# Patient Record
Sex: FEMALE | Race: WHITE | NOT HISPANIC OR LATINO | Employment: OTHER | URBAN - METROPOLITAN AREA
[De-identification: names, ages, dates, MRNs, and addresses within clinical notes are randomized per-mention and may not be internally consistent; named-entity substitution may affect disease eponyms.]

---

## 2017-01-25 ENCOUNTER — ANTI-COAG VISIT (OUTPATIENT)
Dept: CARDIOLOGY | Facility: CLINIC | Age: 77
End: 2017-01-25

## 2017-01-25 ENCOUNTER — LAB VISIT (OUTPATIENT)
Dept: LAB | Facility: HOSPITAL | Age: 77
End: 2017-01-25
Attending: INTERNAL MEDICINE
Payer: MEDICARE

## 2017-01-25 DIAGNOSIS — Z79.01 LONG-TERM (CURRENT) USE OF ANTICOAGULANTS: ICD-10-CM

## 2017-01-25 DIAGNOSIS — I48.0 PAF (PAROXYSMAL ATRIAL FIBRILLATION): ICD-10-CM

## 2017-01-25 LAB
INR PPP: 2.1
PROTHROMBIN TIME: 23.4 SEC

## 2017-01-25 PROCEDURE — 36415 COLL VENOUS BLD VENIPUNCTURE: CPT | Mod: PO

## 2017-01-25 PROCEDURE — 85610 PROTHROMBIN TIME: CPT | Mod: PO

## 2017-02-24 NOTE — TELEPHONE ENCOUNTER
----- Message from Wanda Simental sent at 2/23/2017  4:36 PM CST -----  Contact: self  Pt came in the office requesting a refill on her Cardio meds   Pt uses [facility] [phone]  Pt needs: pacerone 200 mg   Norcasv 10mg  Lasix 20 mg  Lopressor 50 mg

## 2017-02-27 RX ORDER — AMLODIPINE BESYLATE 10 MG/1
10 TABLET ORAL DAILY
Qty: 90 TABLET | Refills: 3 | Status: SHIPPED | OUTPATIENT
Start: 2017-02-27 | End: 2018-04-25 | Stop reason: SDUPTHER

## 2017-02-27 RX ORDER — FUROSEMIDE 20 MG/1
20 TABLET ORAL DAILY
Qty: 90 TABLET | Refills: 3 | Status: SHIPPED | OUTPATIENT
Start: 2017-02-27 | End: 2019-01-30 | Stop reason: SDUPTHER

## 2017-02-27 RX ORDER — AMIODARONE HYDROCHLORIDE 200 MG/1
200 TABLET ORAL DAILY
Qty: 90 TABLET | Refills: 3 | Status: SHIPPED | OUTPATIENT
Start: 2017-02-27 | End: 2018-03-01 | Stop reason: SDUPTHER

## 2017-02-27 RX ORDER — METOPROLOL TARTRATE 50 MG/1
50 TABLET ORAL 2 TIMES DAILY
Qty: 180 TABLET | Refills: 3 | Status: SHIPPED | OUTPATIENT
Start: 2017-02-27 | End: 2019-01-30 | Stop reason: SDUPTHER

## 2017-02-27 RX ORDER — ROSUVASTATIN CALCIUM 10 MG/1
10 TABLET, COATED ORAL NIGHTLY
Qty: 90 TABLET | Refills: 3 | Status: SHIPPED | OUTPATIENT
Start: 2017-02-27 | End: 2020-02-26 | Stop reason: SDUPTHER

## 2017-03-01 ENCOUNTER — LAB VISIT (OUTPATIENT)
Dept: LAB | Facility: HOSPITAL | Age: 77
End: 2017-03-01
Attending: INTERNAL MEDICINE
Payer: MEDICARE

## 2017-03-01 ENCOUNTER — ANTI-COAG VISIT (OUTPATIENT)
Dept: CARDIOLOGY | Facility: CLINIC | Age: 77
End: 2017-03-01

## 2017-03-01 DIAGNOSIS — Z79.01 LONG-TERM (CURRENT) USE OF ANTICOAGULANTS: ICD-10-CM

## 2017-03-01 DIAGNOSIS — I48.0 PAF (PAROXYSMAL ATRIAL FIBRILLATION): ICD-10-CM

## 2017-03-01 LAB
INR PPP: 2.2
PROTHROMBIN TIME: 24.5 SEC

## 2017-03-01 PROCEDURE — 36415 COLL VENOUS BLD VENIPUNCTURE: CPT | Mod: PO

## 2017-03-01 PROCEDURE — 85610 PROTHROMBIN TIME: CPT | Mod: PO

## 2017-04-12 ENCOUNTER — ANTI-COAG VISIT (OUTPATIENT)
Dept: CARDIOLOGY | Facility: CLINIC | Age: 77
End: 2017-04-12

## 2017-04-12 ENCOUNTER — LAB VISIT (OUTPATIENT)
Dept: LAB | Facility: HOSPITAL | Age: 77
End: 2017-04-12
Attending: INTERNAL MEDICINE
Payer: MEDICARE

## 2017-04-12 DIAGNOSIS — Z79.01 LONG-TERM (CURRENT) USE OF ANTICOAGULANTS: ICD-10-CM

## 2017-04-12 DIAGNOSIS — I48.0 PAF (PAROXYSMAL ATRIAL FIBRILLATION): ICD-10-CM

## 2017-04-12 LAB
INR PPP: 2.6
PROTHROMBIN TIME: 28.3 SEC

## 2017-04-12 PROCEDURE — 85610 PROTHROMBIN TIME: CPT | Mod: PO

## 2017-04-12 PROCEDURE — 36415 COLL VENOUS BLD VENIPUNCTURE: CPT | Mod: PO

## 2017-04-24 RX ORDER — WARFARIN SODIUM 5 MG/1
5 TABLET ORAL DAILY
Qty: 90 TABLET | Refills: 3 | Status: SHIPPED | OUTPATIENT
Start: 2017-04-24 | End: 2018-06-06 | Stop reason: SDUPTHER

## 2017-05-10 ENCOUNTER — LAB VISIT (OUTPATIENT)
Dept: LAB | Facility: HOSPITAL | Age: 77
End: 2017-05-10
Attending: INTERNAL MEDICINE
Payer: MEDICARE

## 2017-05-10 ENCOUNTER — ANTI-COAG VISIT (OUTPATIENT)
Dept: CARDIOLOGY | Facility: CLINIC | Age: 77
End: 2017-05-10

## 2017-05-10 DIAGNOSIS — I48.0 PAF (PAROXYSMAL ATRIAL FIBRILLATION): ICD-10-CM

## 2017-05-10 DIAGNOSIS — Z79.01 LONG-TERM (CURRENT) USE OF ANTICOAGULANTS: ICD-10-CM

## 2017-05-10 LAB
INR PPP: 2.3
PROTHROMBIN TIME: 25.1 SEC

## 2017-05-10 PROCEDURE — 36415 COLL VENOUS BLD VENIPUNCTURE: CPT | Mod: PO

## 2017-05-10 PROCEDURE — 85610 PROTHROMBIN TIME: CPT | Mod: PO

## 2017-06-21 ENCOUNTER — LAB VISIT (OUTPATIENT)
Dept: LAB | Facility: HOSPITAL | Age: 77
End: 2017-06-21
Attending: INTERNAL MEDICINE
Payer: MEDICARE

## 2017-06-21 ENCOUNTER — ANTI-COAG VISIT (OUTPATIENT)
Dept: CARDIOLOGY | Facility: CLINIC | Age: 77
End: 2017-06-21

## 2017-06-21 DIAGNOSIS — I48.0 PAF (PAROXYSMAL ATRIAL FIBRILLATION): ICD-10-CM

## 2017-06-21 DIAGNOSIS — Z79.01 LONG-TERM (CURRENT) USE OF ANTICOAGULANTS: ICD-10-CM

## 2017-06-21 LAB
INR PPP: 3.5
PROTHROMBIN TIME: 37.2 SEC

## 2017-06-21 PROCEDURE — 36415 COLL VENOUS BLD VENIPUNCTURE: CPT | Mod: PO

## 2017-06-21 PROCEDURE — 85610 PROTHROMBIN TIME: CPT | Mod: PO

## 2017-06-21 NOTE — PROGRESS NOTES
Patient denied changes.  Hold then rechallenge prior stable warfarin dose.  Monitor INR more closely.

## 2017-07-12 ENCOUNTER — ANTI-COAG VISIT (OUTPATIENT)
Dept: CARDIOLOGY | Facility: CLINIC | Age: 77
End: 2017-07-12

## 2017-07-12 ENCOUNTER — LAB VISIT (OUTPATIENT)
Dept: LAB | Facility: HOSPITAL | Age: 77
End: 2017-07-12
Attending: INTERNAL MEDICINE
Payer: MEDICARE

## 2017-07-12 DIAGNOSIS — Z79.01 LONG-TERM (CURRENT) USE OF ANTICOAGULANTS: ICD-10-CM

## 2017-07-12 DIAGNOSIS — I48.0 PAF (PAROXYSMAL ATRIAL FIBRILLATION): ICD-10-CM

## 2017-07-12 LAB
INR PPP: 3
PROTHROMBIN TIME: 32.2 SEC

## 2017-07-12 PROCEDURE — 85610 PROTHROMBIN TIME: CPT | Mod: PO

## 2017-07-12 PROCEDURE — 36415 COLL VENOUS BLD VENIPUNCTURE: CPT | Mod: PO

## 2017-08-15 RX ORDER — ROSUVASTATIN CALCIUM 10 MG/1
TABLET, COATED ORAL
Qty: 90 TABLET | Refills: 3 | Status: SHIPPED | OUTPATIENT
Start: 2017-08-15 | End: 2018-08-22 | Stop reason: SDUPTHER

## 2017-08-15 RX ORDER — FUROSEMIDE 20 MG/1
TABLET ORAL
Qty: 90 TABLET | Refills: 3 | Status: SHIPPED | OUTPATIENT
Start: 2017-08-15 | End: 2018-08-22 | Stop reason: SDUPTHER

## 2017-08-16 ENCOUNTER — LAB VISIT (OUTPATIENT)
Dept: LAB | Facility: HOSPITAL | Age: 77
End: 2017-08-16
Attending: INTERNAL MEDICINE
Payer: MEDICARE

## 2017-08-16 ENCOUNTER — ANTI-COAG VISIT (OUTPATIENT)
Dept: CARDIOLOGY | Facility: CLINIC | Age: 77
End: 2017-08-16

## 2017-08-16 DIAGNOSIS — Z79.01 LONG-TERM (CURRENT) USE OF ANTICOAGULANTS: ICD-10-CM

## 2017-08-16 DIAGNOSIS — I48.0 PAF (PAROXYSMAL ATRIAL FIBRILLATION): ICD-10-CM

## 2017-08-16 LAB
INR PPP: 2.8
PROTHROMBIN TIME: 30.3 SEC

## 2017-08-16 PROCEDURE — 85610 PROTHROMBIN TIME: CPT | Mod: PO

## 2017-08-16 PROCEDURE — 36415 COLL VENOUS BLD VENIPUNCTURE: CPT | Mod: PO

## 2017-09-27 ENCOUNTER — ANTI-COAG VISIT (OUTPATIENT)
Dept: CARDIOLOGY | Facility: CLINIC | Age: 77
End: 2017-09-27

## 2017-09-27 ENCOUNTER — LAB VISIT (OUTPATIENT)
Dept: LAB | Facility: HOSPITAL | Age: 77
End: 2017-09-27
Attending: INTERNAL MEDICINE
Payer: MEDICARE

## 2017-09-27 DIAGNOSIS — Z79.01 LONG-TERM (CURRENT) USE OF ANTICOAGULANTS: ICD-10-CM

## 2017-09-27 DIAGNOSIS — I48.0 PAF (PAROXYSMAL ATRIAL FIBRILLATION): ICD-10-CM

## 2017-09-27 LAB
INR PPP: 2.2
PROTHROMBIN TIME: 23.7 SEC

## 2017-09-27 PROCEDURE — 36415 COLL VENOUS BLD VENIPUNCTURE: CPT | Mod: PO

## 2017-09-27 PROCEDURE — 85610 PROTHROMBIN TIME: CPT | Mod: PO

## 2017-11-08 ENCOUNTER — ANTI-COAG VISIT (OUTPATIENT)
Dept: CARDIOLOGY | Facility: CLINIC | Age: 77
End: 2017-11-08

## 2017-11-08 ENCOUNTER — LAB VISIT (OUTPATIENT)
Dept: LAB | Facility: HOSPITAL | Age: 77
End: 2017-11-08
Attending: INTERNAL MEDICINE
Payer: MEDICARE

## 2017-11-08 DIAGNOSIS — I48.0 PAF (PAROXYSMAL ATRIAL FIBRILLATION): ICD-10-CM

## 2017-11-08 DIAGNOSIS — Z79.01 LONG-TERM (CURRENT) USE OF ANTICOAGULANTS: ICD-10-CM

## 2017-11-08 LAB
INR PPP: 2.2
PROTHROMBIN TIME: 23.4 SEC

## 2017-11-08 PROCEDURE — 85610 PROTHROMBIN TIME: CPT | Mod: PO

## 2017-11-08 PROCEDURE — 36415 COLL VENOUS BLD VENIPUNCTURE: CPT | Mod: PO

## 2017-11-16 RX ORDER — METOPROLOL TARTRATE 50 MG/1
TABLET ORAL
Qty: 180 TABLET | Refills: 3 | Status: SHIPPED | OUTPATIENT
Start: 2017-11-16 | End: 2019-04-02 | Stop reason: SDUPTHER

## 2017-12-20 ENCOUNTER — LAB VISIT (OUTPATIENT)
Dept: LAB | Facility: HOSPITAL | Age: 77
End: 2017-12-20
Attending: INTERNAL MEDICINE
Payer: MEDICARE

## 2017-12-20 ENCOUNTER — ANTI-COAG VISIT (OUTPATIENT)
Dept: CARDIOLOGY | Facility: CLINIC | Age: 77
End: 2017-12-20

## 2017-12-20 DIAGNOSIS — Z79.01 LONG-TERM (CURRENT) USE OF ANTICOAGULANTS: ICD-10-CM

## 2017-12-20 DIAGNOSIS — I48.0 PAF (PAROXYSMAL ATRIAL FIBRILLATION): ICD-10-CM

## 2017-12-20 LAB
INR PPP: 2.1
PROTHROMBIN TIME: 23.2 SEC

## 2017-12-20 PROCEDURE — 85610 PROTHROMBIN TIME: CPT | Mod: PO

## 2017-12-20 PROCEDURE — 36415 COLL VENOUS BLD VENIPUNCTURE: CPT | Mod: PO

## 2018-01-11 ENCOUNTER — TELEPHONE (OUTPATIENT)
Dept: FAMILY MEDICINE | Facility: CLINIC | Age: 78
End: 2018-01-11

## 2018-01-11 NOTE — TELEPHONE ENCOUNTER
----- Message from Barbra Agosto sent at 1/11/2018  3:23 PM CST -----  Contact: Kamille/[phone] ext [phone]  Kamille with [facility] needs to go over the plan of care for the patients diabetes, location of corn or callous or foot deformity and be signed and dated by the doctor. Please call her at [phone]

## 2018-01-31 ENCOUNTER — LAB VISIT (OUTPATIENT)
Dept: LAB | Facility: HOSPITAL | Age: 78
End: 2018-01-31
Attending: INTERNAL MEDICINE
Payer: MEDICARE

## 2018-01-31 ENCOUNTER — ANTI-COAG VISIT (OUTPATIENT)
Dept: CARDIOLOGY | Facility: CLINIC | Age: 78
End: 2018-01-31

## 2018-01-31 DIAGNOSIS — Z79.01 LONG TERM CURRENT USE OF ANTICOAGULANT THERAPY: ICD-10-CM

## 2018-01-31 DIAGNOSIS — I48.0 PAF (PAROXYSMAL ATRIAL FIBRILLATION): ICD-10-CM

## 2018-01-31 LAB
INR PPP: 2.5
PROTHROMBIN TIME: 27.3 SEC

## 2018-01-31 PROCEDURE — 36415 COLL VENOUS BLD VENIPUNCTURE: CPT | Mod: PO

## 2018-01-31 PROCEDURE — 85610 PROTHROMBIN TIME: CPT | Mod: PO

## 2018-02-14 ENCOUNTER — LAB VISIT (OUTPATIENT)
Dept: LAB | Facility: HOSPITAL | Age: 78
End: 2018-02-14
Attending: INTERNAL MEDICINE
Payer: MEDICARE

## 2018-02-14 ENCOUNTER — ANTI-COAG VISIT (OUTPATIENT)
Dept: CARDIOLOGY | Facility: CLINIC | Age: 78
End: 2018-02-14

## 2018-02-14 DIAGNOSIS — I48.0 PAF (PAROXYSMAL ATRIAL FIBRILLATION): ICD-10-CM

## 2018-02-14 DIAGNOSIS — Z79.01 LONG TERM CURRENT USE OF ANTICOAGULANT THERAPY: ICD-10-CM

## 2018-02-14 LAB
INR PPP: 2.9
PROTHROMBIN TIME: 31.5 SEC

## 2018-02-14 PROCEDURE — 85610 PROTHROMBIN TIME: CPT | Mod: PO

## 2018-02-14 PROCEDURE — 36415 COLL VENOUS BLD VENIPUNCTURE: CPT | Mod: PO

## 2018-02-28 ENCOUNTER — LAB VISIT (OUTPATIENT)
Dept: LAB | Facility: HOSPITAL | Age: 78
End: 2018-02-28
Attending: INTERNAL MEDICINE
Payer: MEDICARE

## 2018-02-28 ENCOUNTER — ANTI-COAG VISIT (OUTPATIENT)
Dept: CARDIOLOGY | Facility: CLINIC | Age: 78
End: 2018-02-28

## 2018-02-28 DIAGNOSIS — Z79.01 LONG TERM CURRENT USE OF ANTICOAGULANT THERAPY: ICD-10-CM

## 2018-02-28 DIAGNOSIS — I48.0 PAF (PAROXYSMAL ATRIAL FIBRILLATION): ICD-10-CM

## 2018-02-28 LAB
INR PPP: 2.2
PROTHROMBIN TIME: 24.2 SEC

## 2018-02-28 PROCEDURE — 36415 COLL VENOUS BLD VENIPUNCTURE: CPT | Mod: PO

## 2018-02-28 PROCEDURE — 85610 PROTHROMBIN TIME: CPT | Mod: PO

## 2018-03-01 RX ORDER — AMIODARONE HYDROCHLORIDE 200 MG/1
TABLET ORAL
Qty: 90 TABLET | Refills: 3 | Status: SHIPPED | OUTPATIENT
Start: 2018-03-01 | End: 2019-01-30 | Stop reason: ALTCHOICE

## 2018-03-14 ENCOUNTER — ANTI-COAG VISIT (OUTPATIENT)
Dept: CARDIOLOGY | Facility: CLINIC | Age: 78
End: 2018-03-14

## 2018-03-14 ENCOUNTER — LAB VISIT (OUTPATIENT)
Dept: LAB | Facility: HOSPITAL | Age: 78
End: 2018-03-14
Attending: INTERNAL MEDICINE
Payer: MEDICARE

## 2018-03-14 DIAGNOSIS — Z79.01 LONG TERM CURRENT USE OF ANTICOAGULANT THERAPY: ICD-10-CM

## 2018-03-14 DIAGNOSIS — I48.0 PAF (PAROXYSMAL ATRIAL FIBRILLATION): ICD-10-CM

## 2018-03-14 LAB
INR PPP: 3.5
PROTHROMBIN TIME: 37.8 SEC

## 2018-03-14 PROCEDURE — 85610 PROTHROMBIN TIME: CPT | Mod: PO

## 2018-03-14 PROCEDURE — 36415 COLL VENOUS BLD VENIPUNCTURE: CPT | Mod: PO

## 2018-03-28 ENCOUNTER — LAB VISIT (OUTPATIENT)
Dept: LAB | Facility: HOSPITAL | Age: 78
End: 2018-03-28
Attending: INTERNAL MEDICINE
Payer: MEDICARE

## 2018-03-28 ENCOUNTER — ANTI-COAG VISIT (OUTPATIENT)
Dept: CARDIOLOGY | Facility: CLINIC | Age: 78
End: 2018-03-28

## 2018-03-28 DIAGNOSIS — I48.0 PAF (PAROXYSMAL ATRIAL FIBRILLATION): ICD-10-CM

## 2018-03-28 DIAGNOSIS — Z79.01 LONG TERM CURRENT USE OF ANTICOAGULANT THERAPY: ICD-10-CM

## 2018-03-28 LAB
INR PPP: 2.6
PROTHROMBIN TIME: 28 SEC

## 2018-03-28 PROCEDURE — 36415 COLL VENOUS BLD VENIPUNCTURE: CPT | Mod: PO

## 2018-03-28 PROCEDURE — 85610 PROTHROMBIN TIME: CPT | Mod: PO

## 2018-04-11 ENCOUNTER — LAB VISIT (OUTPATIENT)
Dept: LAB | Facility: HOSPITAL | Age: 78
End: 2018-04-11
Attending: INTERNAL MEDICINE
Payer: MEDICARE

## 2018-04-11 ENCOUNTER — ANTI-COAG VISIT (OUTPATIENT)
Dept: CARDIOLOGY | Facility: CLINIC | Age: 78
End: 2018-04-11

## 2018-04-11 DIAGNOSIS — I48.0 PAF (PAROXYSMAL ATRIAL FIBRILLATION): ICD-10-CM

## 2018-04-11 DIAGNOSIS — Z79.01 LONG TERM CURRENT USE OF ANTICOAGULANT THERAPY: ICD-10-CM

## 2018-04-11 LAB
INR PPP: 2
PROTHROMBIN TIME: 21.4 SEC

## 2018-04-11 PROCEDURE — 36415 COLL VENOUS BLD VENIPUNCTURE: CPT | Mod: PO

## 2018-04-11 PROCEDURE — 85610 PROTHROMBIN TIME: CPT | Mod: PO

## 2018-04-26 RX ORDER — AMLODIPINE BESYLATE 10 MG/1
TABLET ORAL
Qty: 90 TABLET | Refills: 3 | Status: SHIPPED | OUTPATIENT
Start: 2018-04-26 | End: 2020-02-26

## 2018-05-02 ENCOUNTER — ANTI-COAG VISIT (OUTPATIENT)
Dept: CARDIOLOGY | Facility: CLINIC | Age: 78
End: 2018-05-02

## 2018-05-02 ENCOUNTER — LAB VISIT (OUTPATIENT)
Dept: LAB | Facility: HOSPITAL | Age: 78
End: 2018-05-02
Attending: INTERNAL MEDICINE
Payer: MEDICARE

## 2018-05-02 DIAGNOSIS — I48.0 PAF (PAROXYSMAL ATRIAL FIBRILLATION): ICD-10-CM

## 2018-05-02 DIAGNOSIS — Z79.01 LONG TERM CURRENT USE OF ANTICOAGULANT THERAPY: ICD-10-CM

## 2018-05-02 LAB
INR PPP: 3.3
PROTHROMBIN TIME: 35.8 SEC

## 2018-05-02 PROCEDURE — 36415 COLL VENOUS BLD VENIPUNCTURE: CPT | Mod: PO

## 2018-05-02 PROCEDURE — 85610 PROTHROMBIN TIME: CPT | Mod: PO

## 2018-05-16 ENCOUNTER — LAB VISIT (OUTPATIENT)
Dept: LAB | Facility: HOSPITAL | Age: 78
End: 2018-05-16
Attending: INTERNAL MEDICINE
Payer: MEDICARE

## 2018-05-16 DIAGNOSIS — I48.0 PAF (PAROXYSMAL ATRIAL FIBRILLATION): ICD-10-CM

## 2018-05-16 DIAGNOSIS — Z79.01 LONG TERM CURRENT USE OF ANTICOAGULANT THERAPY: ICD-10-CM

## 2018-05-16 LAB
INR PPP: 2.9
PROTHROMBIN TIME: 30.8 SEC

## 2018-05-16 PROCEDURE — 36415 COLL VENOUS BLD VENIPUNCTURE: CPT | Mod: PO

## 2018-05-16 PROCEDURE — 85610 PROTHROMBIN TIME: CPT | Mod: PO

## 2018-05-17 ENCOUNTER — ANTI-COAG VISIT (OUTPATIENT)
Dept: CARDIOLOGY | Facility: CLINIC | Age: 78
End: 2018-05-17

## 2018-05-17 DIAGNOSIS — I48.0 PAF (PAROXYSMAL ATRIAL FIBRILLATION): ICD-10-CM

## 2018-06-06 ENCOUNTER — LAB VISIT (OUTPATIENT)
Dept: LAB | Facility: HOSPITAL | Age: 78
End: 2018-06-06
Attending: INTERNAL MEDICINE
Payer: MEDICARE

## 2018-06-06 ENCOUNTER — ANTI-COAG VISIT (OUTPATIENT)
Dept: CARDIOLOGY | Facility: CLINIC | Age: 78
End: 2018-06-06

## 2018-06-06 DIAGNOSIS — I48.0 PAF (PAROXYSMAL ATRIAL FIBRILLATION): ICD-10-CM

## 2018-06-06 DIAGNOSIS — Z79.01 LONG TERM CURRENT USE OF ANTICOAGULANT THERAPY: ICD-10-CM

## 2018-06-06 LAB
INR PPP: 2.6
PROTHROMBIN TIME: 28.4 SEC

## 2018-06-06 PROCEDURE — 36415 COLL VENOUS BLD VENIPUNCTURE: CPT | Mod: PO

## 2018-06-06 PROCEDURE — 85610 PROTHROMBIN TIME: CPT | Mod: PO

## 2018-06-08 RX ORDER — WARFARIN SODIUM 5 MG/1
TABLET ORAL
Qty: 90 TABLET | Refills: 3 | Status: SHIPPED | OUTPATIENT
Start: 2018-06-08 | End: 2018-08-01 | Stop reason: ALTCHOICE

## 2018-06-12 ENCOUNTER — TELEPHONE (OUTPATIENT)
Dept: CARDIOLOGY | Facility: CLINIC | Age: 78
End: 2018-06-12

## 2018-06-12 NOTE — TELEPHONE ENCOUNTER
----- Message from MÓNICA Rizvi ANP sent at 6/8/2018  2:13 PM CDT -----  Contact: Greer  Patient last seen by Dr. Nguyen in 5/2016. Can you please review to see if she needs to follow up? I would presume she would need to follow up every year    Thanks  Greer

## 2018-06-27 ENCOUNTER — LAB VISIT (OUTPATIENT)
Dept: LAB | Facility: HOSPITAL | Age: 78
End: 2018-06-27
Attending: INTERNAL MEDICINE
Payer: MEDICARE

## 2018-06-27 DIAGNOSIS — Z79.01 LONG TERM CURRENT USE OF ANTICOAGULANT THERAPY: ICD-10-CM

## 2018-06-27 DIAGNOSIS — I48.0 PAF (PAROXYSMAL ATRIAL FIBRILLATION): ICD-10-CM

## 2018-06-27 LAB
INR PPP: 1.8
PROTHROMBIN TIME: 19.5 SEC

## 2018-06-27 PROCEDURE — 85610 PROTHROMBIN TIME: CPT | Mod: PO

## 2018-06-27 PROCEDURE — 36415 COLL VENOUS BLD VENIPUNCTURE: CPT | Mod: PO

## 2018-06-28 ENCOUNTER — ANTI-COAG VISIT (OUTPATIENT)
Dept: CARDIOLOGY | Facility: CLINIC | Age: 78
End: 2018-06-28

## 2018-06-28 DIAGNOSIS — I48.0 PAF (PAROXYSMAL ATRIAL FIBRILLATION): ICD-10-CM

## 2018-07-11 ENCOUNTER — LAB VISIT (OUTPATIENT)
Dept: LAB | Facility: HOSPITAL | Age: 78
End: 2018-07-11
Attending: INTERNAL MEDICINE
Payer: MEDICARE

## 2018-07-11 ENCOUNTER — ANTI-COAG VISIT (OUTPATIENT)
Dept: CARDIOLOGY | Facility: CLINIC | Age: 78
End: 2018-07-11

## 2018-07-11 DIAGNOSIS — Z79.01 LONG TERM CURRENT USE OF ANTICOAGULANT THERAPY: ICD-10-CM

## 2018-07-11 DIAGNOSIS — I48.0 PAF (PAROXYSMAL ATRIAL FIBRILLATION): ICD-10-CM

## 2018-07-11 LAB
INR PPP: 2.9
PROTHROMBIN TIME: 30.9 SEC

## 2018-07-11 PROCEDURE — 36415 COLL VENOUS BLD VENIPUNCTURE: CPT | Mod: PO

## 2018-07-11 PROCEDURE — 85610 PROTHROMBIN TIME: CPT | Mod: PO

## 2018-07-25 ENCOUNTER — TELEPHONE (OUTPATIENT)
Dept: CARDIOLOGY | Facility: CLINIC | Age: 78
End: 2018-07-25

## 2018-08-01 ENCOUNTER — ANTI-COAG VISIT (OUTPATIENT)
Dept: CARDIOLOGY | Facility: CLINIC | Age: 78
End: 2018-08-01

## 2018-08-01 ENCOUNTER — OFFICE VISIT (OUTPATIENT)
Dept: CARDIOLOGY | Facility: CLINIC | Age: 78
End: 2018-08-01
Payer: MEDICARE

## 2018-08-01 ENCOUNTER — LAB VISIT (OUTPATIENT)
Dept: LAB | Facility: HOSPITAL | Age: 78
End: 2018-08-01
Attending: INTERNAL MEDICINE
Payer: MEDICARE

## 2018-08-01 VITALS
DIASTOLIC BLOOD PRESSURE: 50 MMHG | WEIGHT: 183 LBS | HEART RATE: 56 BPM | HEIGHT: 68 IN | BODY MASS INDEX: 27.74 KG/M2 | SYSTOLIC BLOOD PRESSURE: 128 MMHG

## 2018-08-01 DIAGNOSIS — I48.0 PAF (PAROXYSMAL ATRIAL FIBRILLATION): ICD-10-CM

## 2018-08-01 DIAGNOSIS — Z72.0 TOBACCO USE: ICD-10-CM

## 2018-08-01 DIAGNOSIS — R09.89 BILATERAL CAROTID BRUITS: ICD-10-CM

## 2018-08-01 DIAGNOSIS — E78.2 MIXED HYPERLIPIDEMIA: ICD-10-CM

## 2018-08-01 DIAGNOSIS — Z79.01 LONG TERM CURRENT USE OF ANTICOAGULANT THERAPY: ICD-10-CM

## 2018-08-01 DIAGNOSIS — I73.9 PAD (PERIPHERAL ARTERY DISEASE): Primary | ICD-10-CM

## 2018-08-01 LAB
INR PPP: 2.8
PROTHROMBIN TIME: 30.6 SEC

## 2018-08-01 PROCEDURE — 3074F SYST BP LT 130 MM HG: CPT | Mod: CPTII,S$GLB,, | Performed by: INTERNAL MEDICINE

## 2018-08-01 PROCEDURE — 99999 PR PBB SHADOW E&M-EST. PATIENT-LVL III: CPT | Mod: PBBFAC,,, | Performed by: INTERNAL MEDICINE

## 2018-08-01 PROCEDURE — 36415 COLL VENOUS BLD VENIPUNCTURE: CPT | Mod: PO

## 2018-08-01 PROCEDURE — 85610 PROTHROMBIN TIME: CPT | Mod: PO

## 2018-08-01 PROCEDURE — 93000 ELECTROCARDIOGRAM COMPLETE: CPT | Mod: S$GLB,,, | Performed by: INTERNAL MEDICINE

## 2018-08-01 PROCEDURE — 3078F DIAST BP <80 MM HG: CPT | Mod: CPTII,S$GLB,, | Performed by: INTERNAL MEDICINE

## 2018-08-01 PROCEDURE — 99215 OFFICE O/P EST HI 40 MIN: CPT | Mod: S$GLB,,, | Performed by: INTERNAL MEDICINE

## 2018-08-01 RX ORDER — WARFARIN 7.5 MG/1
7.5 TABLET ORAL DAILY
Qty: 90 TABLET | Refills: 3 | Status: SHIPPED | OUTPATIENT
Start: 2018-08-01 | End: 2018-08-01 | Stop reason: ALTCHOICE

## 2018-08-01 NOTE — PROGRESS NOTES
Subjective:   Patient ID:  Yanni Cra is a 77 y.o. female who presents to establish care for  Hyperlipidemia; Hypertension; Peripheral Arterial Disease; and R carotid bruit      HPI:       Yanni Car 77 y.o. female is here follow up and feeling well without any new complaints. She was previously under the care of Dr. Valerio. She had PAF several years ago. She has been on coumadin and amiodarone for 2 years. She in NSR today. She has PAD s/p failed SVG R fem-pop in 2013. No claudication. She smokes and is very interested in quitting.       ECG today: sinus bradycardia       Afib was related to SIRS in 2012 while she was admitted for pneumonia    Carotid US 2016   Patent bilateral ICA   Patent L vertebral   Retrograde flow R vertebral concerning for steal syndrome    Event monitor 2016   No afib              Patient Active Problem List    Diagnosis Date Noted    Long term current use of anticoagulant therapy 2016    PAD (peripheral artery disease) 2016       S/p failed R reverse vein fem-pop done 2013   Failure within 2 to 4 weeks   No claudication at this time      Tobacco use 2016    Hyperlipidemia 2016    PAF (paroxysmal atrial fibrillation) 2016     In NSR today-on amiodarone  [id number] 4 with 4% risk of yearly CVA    Afib was related to SIRS in 2012  She was admitted for pneumonia              Bilateral carotid bruits 2016    BCC (basal cell carcinoma), scalp/neck 2012    Post-operative state 2012    Mohs defect of eyelid 2012    Basal cell carcinoma of eyelid 09/10/2012    Canthal dystopia 09/10/2012    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   HENT: Negative for congestion.    Eyes: Negative for blurred vision, discharge and double vision.    Cardiovascular: Positive for leg swelling. Negative for chest pain, claudication, cyanosis, dyspnea on exertion, irregular heartbeat, near-syncope, orthopnea, palpitations, paroxysmal nocturnal dyspnea and syncope.   Respiratory: Negative for cough, shortness of breath and wheezing.    Endocrine: Negative for cold intolerance, heat intolerance and polyphagia.   Hematologic/Lymphatic: Negative for adenopathy and bleeding problem. Does not bruise/bleed easily.   Skin: Negative for dry skin and nail changes.   Musculoskeletal: Negative for arthritis, back pain, falls, joint pain, myalgias and neck pain.   Gastrointestinal: Negative for bloating, abdominal pain, change in bowel habit and constipation.   Genitourinary: Negative for bladder incontinence, dysuria, flank pain, genital sores and missed menses.   Neurological: Negative for aphonia, brief paralysis, difficulty with concentration and dizziness.   Psychiatric/Behavioral: Negative for altered mental status and memory loss. The patient does not have insomnia.    Allergic/Immunologic: Negative for environmental allergies.       Objective:   Physical Exam   Constitutional: She is oriented to person, place, and time. She appears well-developed and well-nourished. She is not intubated.   HENT:   Head: Normocephalic and atraumatic.   Right Ear: External ear normal.   Left Ear: External ear normal.   Mouth/Throat: Oropharynx is clear and moist.   Eyes: Conjunctivae and EOM are normal. Pupils are equal, round, and reactive to light. Right eye exhibits no discharge. Left eye exhibits no discharge. No scleral icterus.   Neck: Normal range of motion. Neck supple. Normal carotid pulses, no hepatojugular reflux and no JVD present. Carotid bruit is not present. No tracheal deviation present. No thyromegaly present.   Cardiovascular: Normal rate, regular rhythm, S1 normal and S2 normal.   No extrasystoles are present. PMI is not displaced.  Exam reveals no gallop, no S3, no  distant heart sounds, no friction rub and no midsystolic click.    No murmur heard.  Pulses:       Carotid pulses are 2+ on the right side with bruit, and 2+ on the left side with bruit.       Radial pulses are 2+ on the right side, and 2+ on the left side.        Femoral pulses are 2+ on the right side, and 2+ on the left side.       Popliteal pulses are 2+ on the right side, and 2+ on the left side.        Dorsalis pedis pulses are 0 on the right side, and 0 on the left side.        Posterior tibial pulses are 0 on the right side, and 0 on the left side.   Monophasic R DP with biphasic R PT  Monophasic L PT with biphasic L DP   Pulmonary/Chest: Effort normal and breath sounds normal. No accessory muscle usage or stridor. No apnea, no tachypnea and no bradypnea. She is not intubated. No respiratory distress. She has no decreased breath sounds. She has no wheezes. She has no rales. She exhibits no tenderness and no bony tenderness.   Abdominal: She exhibits no distension, no pulsatile liver, no abdominal bruit, no ascites, no pulsatile midline mass and no mass. There is no tenderness. There is no rebound and no guarding.   Musculoskeletal: Normal range of motion. She exhibits no edema or tenderness.   Lymphadenopathy:     She has no cervical adenopathy.   Trace edema of bilateral ankles   Neurological: She is alert and oriented to person, place, and time. She has normal reflexes. No cranial nerve deficit. Coordination normal.   Skin: Skin is warm. No rash noted. No erythema. No pallor.   Psychiatric: She has a normal mood and affect. Her behavior is normal. Judgment and thought content normal.       Assessment:     1. PAD (peripheral artery disease)    2. Mixed hyperlipidemia    3. PAF (paroxysmal atrial fibrillation)    4. Bilateral carotid bruits    5. Tobacco use    6. Long term current use of anticoagulant therapy        Plan:       Low salt diet  Exercise  Smoking cessation      Echo to assess EF  Carotid  ultrasound for bruit  ABIX         She will stop coumadin  Afib was likely related to SIRS in 2012  Will also stop amiodarone after next visit        Continue with current medical plan and lifestyle changes.  Return sooner for concerns or questions. If symptoms persist go to the ED  I have reviewed all pertinent data on this patient       I have reviewed the patient's medical history in detail and updated the computerized patient record.    Orders Placed This Encounter   Procedures    US Carotid Bilateral     Standing Status:   Future     Standing Expiration Date:   8/1/2019    Comprehensive metabolic panel     Standing Status:   Future     Standing Expiration Date:   9/30/2019    Lipid panel     Standing Status:   Future     Standing Expiration Date:   9/30/2019    Ambulatory referral to Smoking Cessation Program     Referral Priority:   Routine     Referral Type:   Consultation     Referral Reason:   Specialty Services Required     Requested Specialty:   CTTS     Number of Visits Requested:   1    Cardiology [facility], Lower Extremities     Standing Status:   Future     Standing Expiration Date:   8/1/2019    IN OFFICE EKG 12-LEAD (to Muse)     Order Specific Question:   Diagnosis     Answer:   Atrial fibrillation [427.31.ICD-9-CM]       Follow up as scheduled. Return sooner for concerns or questions  Follow up after these studies in 6 months          She expressed verbal understanding and agreed with the plan        Patient's Medications   New Prescriptions    No medications on file   Previous Medications    AMIODARONE (PACERONE) 200 MG TAB    TAKE 1 TABLET BY MOUTH EVERY DAY    AMLODIPINE (NORVASC) 10 MG TABLET    TAKE 1 TABLET BY MOUTH EVERY DAY    ASPIRIN (ECOTRIN) 81 MG EC TABLET    Take 81 mg by mouth once daily.      CALCIUM-VITAMIN D3 (CALCIUM 500 + D) 500 MG(1,250MG) -200 UNIT PER TABLET    Take 1 tablet by mouth 2 (two) times daily with meals.    FISH OIL-OMEGA-3 FATTY ACIDS 300-1,000 MG  CAPSULE    Take 2 g by mouth once daily.      FUROSEMIDE (LASIX) 20 MG TABLET    Take 1 tablet (20 mg total) by mouth once daily.    FUROSEMIDE (LASIX) 20 MG TABLET    TAKE 1 TABLET BY MOUTH ONCE A DAY    METOPROLOL TARTRATE (LOPRESSOR) 50 MG TABLET    Take 1 tablet (50 mg total) by mouth 2 (two) times daily.    METOPROLOL TARTRATE (LOPRESSOR) 50 MG TABLET    TAKE 1 TABLET BY MOUTH TWO TIMES A DAY    RESTASIS 0.05 % OPHTHALMIC EMULSION        ROSUVASTATIN (CRESTOR) 10 MG TABLET    Take 1 tablet (10 mg total) by mouth every evening.    ROSUVASTATIN (CRESTOR) 10 MG TABLET    TAKE 1 TABLET BY MOUTH EVERY EVENING   Modified Medications    No medications on file   Discontinued Medications    WARFARIN (COUMADIN) 5 MG TABLET    TAKE 1 TABLET BY MOUTH EVERY DAY

## 2018-08-01 NOTE — PATIENT INSTRUCTIONS
Taking Aspirin for Atherosclerosis       Aspirin is a medicine often prescribed to treat atherosclerosis. This condition affects your arteries. These are the blood vessels that carry blood away from your heart. Having atherosclerosis means youre at greater risk of a heart attack or stroke. Aspirin can help prevent these from happening.  How atherosclerosis affects your arteries   A fatty material (plaque) can build up in your arteries. This makes it harder for blood to flow through them. A blood clot can then form on the plaque. This may block the artery, cutting off blood flow. This can cause conditions such as coronary artery disease (CAD) and peripheral arterial disease (PAD):  · CAD occurs when plaque builds up in the coronary artery. This artery supplies the heart with oxygen-rich blood.  · PAD occurs when plaque forms in leg arteries.  The same things that cause CAD and PAD can also cause plaque to form in other arteries in your body, such as those in the brain. When plaque occurs in any of these arteries, it raises your risk of heart attack or stroke.  What aspirin does  Aspirin is a blood-thinner (antiplatelet medicine). It helps keep blood clots from forming. This reduces the risk of blockage. Aspirin can be taken daily if you are at high risk of or have already had a heart attack or stroke. It is also used after a procedure called a stent placement. This is when a tiny wire mesh tube, or stent, is placed in an artery to keep it open. Aspirin helps prevent blood clots from forming on the stent.  Taking aspirin safely  Tell your healthcare provider about any medicines you are taking. This includes:  · All prescription medicines  · Over-the-counter medicines  · Herbs, vitamins, and other supplements  Also mention if you have a history of ulcers or bleeding problems. Ask whether you will need to stop taking aspirin before having surgery or dental work. Always take medicines as directed.  Tips for taking  aspirin  · Have a routine. For example, take aspirin with the same meal each day.  · Dont take more than prescribed. A low dose gives the same benefit as a higher one, with a lower risk of side effects.  · Dont skip doses. Aspirin needs to be taken each day to work well.  · Keep track of what you take. A pillbox with days of the week can help, especially if you take several medicines. Or use a list or chart to keep track.  When to call your healthcare provider  Side effects of aspirin are not usually serious. If you do have problems, changing your dose may help. Call your healthcare provider if you have any of the following:  · Excessive bruising (some bruising is normal)  · Nosebleeds, bleeding gums, or other excessive bleeding  · An upset stomach or stomach pain   Date Last Reviewed: 6/1/2016  © 9930-3050 [employer]. [address] [place] [address], [place] [address]. All rights reserved. This information is not intended as a substitute for professional medical care. Always follow your healthcare professional's instructions.

## 2018-08-22 RX ORDER — FUROSEMIDE 20 MG/1
TABLET ORAL
Qty: 90 TABLET | Refills: 3 | Status: SHIPPED | OUTPATIENT
Start: 2018-08-22 | End: 2019-09-04 | Stop reason: SDUPTHER

## 2018-08-22 RX ORDER — ROSUVASTATIN CALCIUM 10 MG/1
TABLET, COATED ORAL
Qty: 90 TABLET | Refills: 3 | Status: SHIPPED | OUTPATIENT
Start: 2018-08-22 | End: 2019-01-30 | Stop reason: SDUPTHER

## 2018-08-29 ENCOUNTER — LAB VISIT (OUTPATIENT)
Dept: LAB | Facility: HOSPITAL | Age: 78
End: 2018-08-29
Attending: INTERNAL MEDICINE
Payer: MEDICARE

## 2018-08-29 DIAGNOSIS — E78.2 MIXED HYPERLIPIDEMIA: ICD-10-CM

## 2018-08-29 LAB
ALBUMIN SERPL BCP-MCNC: 4.2 G/DL
ALP SERPL-CCNC: 74 U/L
ALT SERPL W/O P-5'-P-CCNC: 24 U/L
ANION GAP SERPL CALC-SCNC: 8 MMOL/L
AST SERPL-CCNC: 26 U/L
BILIRUB SERPL-MCNC: 0.5 MG/DL
BUN SERPL-MCNC: 21 MG/DL
CALCIUM SERPL-MCNC: 9.2 MG/DL
CHLORIDE SERPL-SCNC: 108 MMOL/L
CHOLEST SERPL-MCNC: 149 MG/DL
CHOLEST/HDLC SERPL: 2.7 {RATIO}
CO2 SERPL-SCNC: 28 MMOL/L
CREAT SERPL-MCNC: 1.35 MG/DL
EST. GFR  (AFRICAN AMERICAN): 44 ML/MIN/1.73 M^2
EST. GFR  (NON AFRICAN AMERICAN): 38.2 ML/MIN/1.73 M^2
GLUCOSE SERPL-MCNC: 115 MG/DL
HDLC SERPL-MCNC: 55 MG/DL
HDLC SERPL: 36.9 %
LDLC SERPL CALC-MCNC: 71.2 MG/DL
NONHDLC SERPL-MCNC: 94 MG/DL
POTASSIUM SERPL-SCNC: 5.5 MMOL/L
PROT SERPL-MCNC: 7.3 G/DL
SODIUM SERPL-SCNC: 144 MMOL/L
TRIGL SERPL-MCNC: 114 MG/DL

## 2018-08-29 PROCEDURE — 80061 LIPID PANEL: CPT

## 2018-08-29 PROCEDURE — 80053 COMPREHEN METABOLIC PANEL: CPT | Mod: PO

## 2018-08-29 PROCEDURE — 36415 COLL VENOUS BLD VENIPUNCTURE: CPT | Mod: PO

## 2018-09-07 ENCOUNTER — TELEPHONE (OUTPATIENT)
Dept: CARDIOLOGY | Facility: CLINIC | Age: 78
End: 2018-09-07

## 2018-09-07 NOTE — TELEPHONE ENCOUNTER
----- Message from Wanda Simental sent at 9/7/2018 10:11 AM CDT -----  Contact: self  Mrs Car came in the [place] office to inquire about her Coumadin. I called the coumadin clinic spoke to Farida and she informed me that Mrs Car has been discharged from the Coumadin clinic as of Sept. 1st by Dr. Nguyen .  Pt needs clarification as what she needs to do. Please call pt to discuss further [phone]

## 2019-01-22 ENCOUNTER — TELEPHONE (OUTPATIENT)
Dept: CARDIOLOGY | Facility: HOSPITAL | Age: 79
End: 2019-01-22

## 2019-01-22 ENCOUNTER — TELEPHONE (OUTPATIENT)
Dept: CARDIOLOGY | Facility: CLINIC | Age: 79
End: 2019-01-22

## 2019-01-22 DIAGNOSIS — I73.9 PAD (PERIPHERAL ARTERY DISEASE): ICD-10-CM

## 2019-01-22 DIAGNOSIS — I48.0 PAF (PAROXYSMAL ATRIAL FIBRILLATION): Primary | ICD-10-CM

## 2019-01-29 ENCOUNTER — TELEPHONE (OUTPATIENT)
Dept: CARDIOLOGY | Facility: CLINIC | Age: 79
End: 2019-01-29

## 2019-01-29 ENCOUNTER — HOSPITAL ENCOUNTER (OUTPATIENT)
Dept: RADIOLOGY | Facility: HOSPITAL | Age: 79
Discharge: HOME | End: 2019-01-29
Attending: INTERNAL MEDICINE
Payer: MEDICARE

## 2019-01-29 ENCOUNTER — HOSPITAL ENCOUNTER (OUTPATIENT)
Dept: CARDIOLOGY | Facility: HOSPITAL | Age: 79
Discharge: HOME | End: 2019-01-29
Attending: INTERNAL MEDICINE
Payer: MEDICARE

## 2019-01-29 DIAGNOSIS — R09.89 BILATERAL CAROTID BRUITS: ICD-10-CM

## 2019-01-29 DIAGNOSIS — I73.9 PAD (PERIPHERAL ARTERY DISEASE): ICD-10-CM

## 2019-01-29 DIAGNOSIS — I48.0 PAF (PAROXYSMAL ATRIAL FIBRILLATION): ICD-10-CM

## 2019-01-29 LAB
AORTIC ROOT ANNULUS: 3.5 CM
AORTIC VALVE CUSP SEPERATION: 1.88 CM
AV INDEX (PROSTH): 0.86
AV MEAN GRADIENT: 3.08 MMHG
AV PEAK GRADIENT: 5.76 MMHG
AV VALVE AREA: 2.68 CM2
AV VELOCITY RATIO: 0.83
CV ECHO LV RWT: 0.44 CM
DOP CALC AO PEAK VEL: 1.2 M/S
DOP CALC AO VTI: 28.42 CM
DOP CALC LVOT AREA: 3.11 CM2
DOP CALC LVOT DIAMETER: 1.99 CM
DOP CALC LVOT PEAK VEL: 0.99 M/S
DOP CALC LVOT STROKE VOLUME: 76.1 CM3
DOP CALCLVOT PEAK VEL VTI: 24.48 CM
E WAVE DECELERATION TIME: 370.2 MSEC
E/A RATIO: 0.74
ECHO LV POSTERIOR WALL: 0.81 CM (ref 0.6–1.1)
FRACTIONAL SHORTENING: 24 % (ref 28–44)
INTERVENTRICULAR SEPTUM: 1.2 CM (ref 0.6–1.1)
IVRT: 0.08 MSEC
LA MAJOR: 3.9 CM
LA MINOR: 2.6 CM
LA WIDTH: 2.6 CM
LEFT ATRIUM SIZE: 2.8 CM
LEFT ATRIUM VOLUME: 19.31 CM3
LEFT INTERNAL DIMENSION IN SYSTOLE: 2.8 CM (ref 2.1–4)
LEFT VENTRICLE DIASTOLIC VOLUME: 58.07 ML
LEFT VENTRICLE SYSTOLIC VOLUME: 29.54 ML
LEFT VENTRICULAR INTERNAL DIMENSION IN DIASTOLE: 3.7 CM (ref 3.5–6)
LEFT VENTRICULAR MASS: 113.35 G
LV LATERAL E/E' RATIO: 8.88
MV PEAK A VEL: 0.96 M/S
MV PEAK E VEL: 0.71 M/S
PISA TR MAX VEL: 2.44 M/S
PULM VEIN S/D RATIO: 1.38
PV PEAK D VEL: 0.29 M/S
PV PEAK S VEL: 0.4 M/S
PV PEAK VELOCITY: 0.71 CM/S
RA MAJOR: 4.57 CM
RA PRESSURE: 3 MMHG
RIGHT VENTRICULAR END-DIASTOLIC DIMENSION: 3.42 CM
TDI LATERAL: 0.08
TR MAX PG: 23.81 MMHG
TV REST PULMONARY ARTERY PRESSURE: 27 MMHG

## 2019-01-29 PROCEDURE — 93922 CV US ANKLE BRACHIAL INDICES WO STRESS W TOE TRACINGS (CUPID ONLY): ICD-10-PCS | Mod: 26,,, | Performed by: INTERNAL MEDICINE

## 2019-01-29 PROCEDURE — 93880 EXTRACRANIAL BILAT STUDY: CPT | Mod: TC

## 2019-01-29 PROCEDURE — 93922 UPR/L XTREMITY ART 2 LEVELS: CPT | Mod: 50

## 2019-01-29 PROCEDURE — 93306 TTE W/DOPPLER COMPLETE: CPT | Mod: 26,,, | Performed by: INTERNAL MEDICINE

## 2019-01-29 PROCEDURE — 93880 EXTRACRANIAL BILAT STUDY: CPT | Mod: 26,,, | Performed by: RADIOLOGY

## 2019-01-29 PROCEDURE — 93306 TRANSTHORACIC ECHO (TTE) COMPLETE (CUPID ONLY): ICD-10-PCS | Mod: 26,,, | Performed by: INTERNAL MEDICINE

## 2019-01-29 PROCEDURE — 93306 TTE W/DOPPLER COMPLETE: CPT

## 2019-01-29 PROCEDURE — 93880 US CAROTID BILATERAL: ICD-10-PCS | Mod: 26,,, | Performed by: RADIOLOGY

## 2019-01-29 PROCEDURE — 93922 UPR/L XTREMITY ART 2 LEVELS: CPT | Mod: 26,,, | Performed by: INTERNAL MEDICINE

## 2019-01-29 NOTE — TELEPHONE ENCOUNTER
----- Message from Leyda Arenas sent at 1/25/2019  8:32 AM CST -----  Mrs. Car is scheduled to see you in [facility] on Wed., Jan. 30, 2019.  She's having a few tests performed before her visit.  If it is concluded that a procedure will be necessary, please mention the PADDM study to her.  After which, l will follow-up the visit with a telephone call.    Thank you,  Leyda

## 2019-01-30 ENCOUNTER — OFFICE VISIT (OUTPATIENT)
Dept: CARDIOLOGY | Facility: CLINIC | Age: 79
End: 2019-01-30
Payer: MEDICARE

## 2019-01-30 VITALS
DIASTOLIC BLOOD PRESSURE: 70 MMHG | SYSTOLIC BLOOD PRESSURE: 130 MMHG | WEIGHT: 187 LBS | HEIGHT: 68 IN | BODY MASS INDEX: 28.34 KG/M2 | HEART RATE: 70 BPM

## 2019-01-30 DIAGNOSIS — Z79.01 LONG TERM CURRENT USE OF ANTICOAGULANT THERAPY: ICD-10-CM

## 2019-01-30 DIAGNOSIS — I48.0 PAF (PAROXYSMAL ATRIAL FIBRILLATION): ICD-10-CM

## 2019-01-30 DIAGNOSIS — I73.9 PAD (PERIPHERAL ARTERY DISEASE): Primary | ICD-10-CM

## 2019-01-30 DIAGNOSIS — E78.2 MIXED HYPERLIPIDEMIA: ICD-10-CM

## 2019-01-30 DIAGNOSIS — R09.89 BILATERAL CAROTID BRUITS: ICD-10-CM

## 2019-01-30 DIAGNOSIS — Z72.0 TOBACCO USE: ICD-10-CM

## 2019-01-30 PROCEDURE — 99999 PR PBB SHADOW E&M-EST. PATIENT-LVL III: ICD-10-PCS | Mod: PBBFAC,,, | Performed by: INTERNAL MEDICINE

## 2019-01-30 PROCEDURE — 3078F DIAST BP <80 MM HG: CPT | Mod: CPTII,S$GLB,, | Performed by: INTERNAL MEDICINE

## 2019-01-30 PROCEDURE — 99215 OFFICE O/P EST HI 40 MIN: CPT | Mod: S$GLB,,, | Performed by: INTERNAL MEDICINE

## 2019-01-30 PROCEDURE — 3075F PR MOST RECENT SYSTOLIC BLOOD PRESS GE 130-139MM HG: ICD-10-PCS | Mod: CPTII,S$GLB,, | Performed by: INTERNAL MEDICINE

## 2019-01-30 PROCEDURE — 99999 PR PBB SHADOW E&M-EST. PATIENT-LVL III: CPT | Mod: PBBFAC,,, | Performed by: INTERNAL MEDICINE

## 2019-01-30 PROCEDURE — 99215 PR OFFICE/OUTPT VISIT, EST, LEVL V, 40-54 MIN: ICD-10-PCS | Mod: S$GLB,,, | Performed by: INTERNAL MEDICINE

## 2019-01-30 PROCEDURE — 3075F SYST BP GE 130 - 139MM HG: CPT | Mod: CPTII,S$GLB,, | Performed by: INTERNAL MEDICINE

## 2019-01-30 PROCEDURE — 3078F PR MOST RECENT DIASTOLIC BLOOD PRESSURE < 80 MM HG: ICD-10-PCS | Mod: CPTII,S$GLB,, | Performed by: INTERNAL MEDICINE

## 2019-01-30 RX ORDER — CILOSTAZOL 100 MG/1
100 TABLET ORAL 2 TIMES DAILY
Qty: 180 TABLET | Refills: 3 | Status: SHIPPED | OUTPATIENT
Start: 2019-01-30 | End: 2020-02-12

## 2019-01-30 NOTE — PROGRESS NOTES
Subjective:   Patient ID:  Yanni Car is a 78 y.o. female who presents to establish care for  Claudication; Peripheral Arterial Disease; Hyperlipidemia; Hypertension; Carotid Artery Disease; and tobacco use      HPI:       Yanni Car 78 y.o. female is here follow up and complaining of persistent dafne IIb R calf claudication. She was previously under the care of Dr. Valerio. She had PAF several years ago during an hospitalization with SIRS. She was on on coumadin and amiodarone for 2 years.. She in NSR today. She has PAD s/p failed SVG R fem-pop in 8/2013. She smokes and is very interested in quitting. She takes aspirin 81 mg daily. No CVA/TIA.       ECG 8/2018: sinus bradycardia       Afib was related to SIRS in 1/2012 while she was admitted for pneumonia    Carotid US 9/2016   Patent bilateral ICA   Patent L vertebral   Retrograde flow R vertebral concerning for steal syndrome    Event monitor 8/2016   No afib      Echo 2016:   Normal EF      S/p failed R reverse vein fem-pop done 8/2013   Failure within 2 to 4 weeks         JUDITH 10/2013     R 0.76   L 0.79      US 2013    R , pSFA 112, mSFA 259, dSFA 32, POP 48, PT 38, AT 27, DP 23  Failed fem-pop + R SFA           JUDITH 1/2019     R NC with TBI 0.41   L 1.28 with TBI 0.41   Monophasic waveforms bilaterally               Patient Active Problem List    Diagnosis Date Noted    Long term current use of anticoagulant therapy 08/18/2016    PAD (peripheral artery disease) 08/17/2016       S/p failed R reverse vein fem-pop done 8/2013   Failure within 2 to 4 weeks         JUDITH 10/2013     R 0.76   L 0.79      US 2013      R , pSFA 112, mSFA 259, dSFA 32, POP 48, PT 38, AT 27, DP 23  Failed fem-pop          JUDITH 1/2019     R NC with TBI 0.41   L 1.28 with TBI 0.41   Monophasic waveforms bilaterally       Tobacco use 08/17/2016    Hyperlipidemia 08/17/2016    PAF (paroxysmal atrial fibrillation) 08/17/2016     In NSR today-on amiodarone  [id number]  4 with 4% risk of yearly CVA    Afib was related to SIRS in 2012  She was admitted for pneumonia      2019:    Normal EF   Normal diastolic parameters   No valvular disease                  Bilateral carotid bruits 2016               R ICA  cm/sec   L ICA PSV 93 cm/sec   Retrograde R vertebral   Antegrade L vertebral             BCC (basal cell carcinoma), scalp/neck 2012    Post-operative state 2012    Mohs defect of eyelid 2012    Basal cell carcinoma of eyelid 09/10/2012    Canthal dystopia 09/10/2012    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   HENT: Negative for congestion.    Eyes: Negative for blurred vision, discharge and double vision.   Cardiovascular: Positive for leg swelling. Negative for chest pain, claudication (R >> L ), cyanosis, dyspnea on exertion, irregular heartbeat, near-syncope, orthopnea, palpitations, paroxysmal nocturnal dyspnea and syncope.   Respiratory: Negative for cough, shortness of breath and wheezing.    Endocrine: Negative for cold intolerance, heat intolerance and polyphagia.   Hematologic/Lymphatic: Negative for adenopathy and bleeding problem. Does not bruise/bleed easily.   Skin: Negative for dry skin and nail changes.   Musculoskeletal: Negative for arthritis, back pain, falls, joint pain, myalgias and neck pain.   Gastrointestinal: Negative for bloating, abdominal pain, change in bowel habit and constipation.   Genitourinary: Negative for bladder incontinence, dysuria, flank pain, genital sores and missed menses.   Neurological: Negative for aphonia, brief paralysis, difficulty with concentration and dizziness.   Psychiatric/Behavioral: Negative for altered mental status and memory loss. The patient does not have insomnia.     Allergic/Immunologic: Negative for environmental allergies.       Objective:   Physical Exam   Constitutional: She is oriented to person, place, and time. She appears well-developed and well-nourished. She is not intubated.   HENT:   Head: Normocephalic and atraumatic.   Right Ear: External ear normal.   Left Ear: External ear normal.   Mouth/Throat: Oropharynx is clear and moist.   Eyes: Conjunctivae and EOM are normal. Pupils are equal, round, and reactive to light. Right eye exhibits no discharge. Left eye exhibits no discharge. No scleral icterus.   Neck: Normal range of motion. Neck supple. Normal carotid pulses, no hepatojugular reflux and no JVD present. Carotid bruit is not present. No tracheal deviation present. No thyromegaly present.   Cardiovascular: Normal rate, regular rhythm, S1 normal and S2 normal.  No extrasystoles are present. PMI is not displaced. Exam reveals no gallop, no S3, no distant heart sounds, no friction rub and no midsystolic click.   No murmur heard.  Pulses:       Carotid pulses are 2+ on the right side with bruit, and 2+ on the left side with bruit.       Radial pulses are 2+ on the right side, and 2+ on the left side.        Femoral pulses are 2+ on the right side, and 2+ on the left side.       Popliteal pulses are 2+ on the right side, and 2+ on the left side.        Dorsalis pedis pulses are 0 on the right side, and 0 on the left side.        Posterior tibial pulses are 0 on the right side, and 0 on the left side.   Faint R DP with biphasic R PT doppler signals     Biphasic L PT and DP doppler signals        Pulmonary/Chest: Effort normal and breath sounds normal. No accessory muscle usage or stridor. No apnea, no tachypnea and no bradypnea. She is not intubated. No respiratory distress. She has no decreased breath sounds. She has no wheezes. She has no rales. She exhibits no tenderness and no bony tenderness.   Abdominal: She exhibits no distension, no pulsatile liver, no  abdominal bruit, no ascites, no pulsatile midline mass and no mass. There is no tenderness. There is no rebound and no guarding.   Musculoskeletal: Normal range of motion. She exhibits no edema or tenderness.   Lymphadenopathy:     She has no cervical adenopathy.   Trace edema of bilateral ankles   Neurological: She is alert and oriented to person, place, and time. She has normal reflexes. No cranial nerve deficit. Coordination normal.   Skin: Skin is warm. No rash noted. No erythema. No pallor.   Psychiatric: She has a normal mood and affect. Her behavior is normal. Judgment and thought content normal.       Assessment:     1. PAD (peripheral artery disease)    2. Bilateral carotid bruits    3. PAF (paroxysmal atrial fibrillation)    4. Mixed hyperlipidemia    5. Tobacco use    6. Long term current use of anticoagulant therapy        Plan:       Low salt diet  Exercise  Smoking cessation enrollment        Yearly carotid ultrasound for bruit  Arterial ultrasound in 3 months  Pletal 100 mg bid for claudication  If there is no improvement she will have revascularization          Continue with current medical plan and lifestyle changes.  Return sooner for concerns or questions. If symptoms persist go to the ED  I have reviewed all pertinent data on this patient       I have reviewed the patient's medical history in detail and updated the computerized patient record.    Orders Placed This Encounter   Procedures    US Lower Extremity Arteries Bilateral     Standing Status:   Future     Standing Expiration Date:   1/30/2020    Ambulatory referral to Smoking Cessation Program     Referral Priority:   Routine     Referral Type:   Consultation     Referral Reason:   Specialty Services Required     Requested Specialty:   CTTS     Number of Visits Requested:   1       Follow up as scheduled. Return sooner for concerns or questions            She expressed verbal understanding and agreed with the plan        Greater than 50%  of the visit of 45 minutes was spent counseling, educating, and coordinating the care of the patient.      -In today's visit, at least 4 established conditions that pose a risk to life or bodily function have been addressed and the conditions are severe.    -In today's visit, monitoring for drug toxicity was accomplished.               Medication List           Accurate as of 1/30/19  2:23 PM. If you have any questions, ask your nurse or doctor.               START taking these medications    cilostazol 100 MG Tab  Commonly known as:  PLETAL  Take 1 tablet (100 mg total) by mouth 2 (two) times daily.  Started by:  Julio Nguyen MD        CONTINUE taking these medications    amLODIPine 10 MG tablet  Commonly known as:  NORVASC  TAKE 1 TABLET BY MOUTH EVERY DAY     aspirin 81 MG EC tablet  Commonly known as:  ECOTRIN     CALCIUM 500 + D 500 mg(1,250mg) -200 unit per tablet  Generic drug:  calcium-vitamin D3     fish oil-omega-3 fatty acids 300-1,000 mg capsule     furosemide 20 MG tablet  Commonly known as:  LASIX  TAKE 1 TABLET BY MOUTH EVERY DAY     metoprolol tartrate 50 MG tablet  Commonly known as:  LOPRESSOR  TAKE 1 TABLET BY MOUTH TWO TIMES A DAY     RESTASIS 0.05 % ophthalmic emulsion  Generic drug:  cycloSPORINE     rosuvastatin 10 MG tablet  Commonly known as:  CRESTOR  Take 1 tablet (10 mg total) by mouth every evening.        STOP taking these medications    amiodarone 200 MG Tab  Commonly known as:  PACERONE  Stopped by:  Julio Nguyen MD           Where to Get Your Medications      These medications were sent to [id number] - [place] - [address] [facility]  [address] [place], [place] [address] [address]    Phone:  [phone]   · cilostazol 100 MG Tab

## 2019-01-30 NOTE — PATIENT INSTRUCTIONS
Taking Aspirin for Atherosclerosis       Aspirin is a medicine often prescribed to treat atherosclerosis. This condition affects your arteries. These are the blood vessels that carry blood away from your heart. Having atherosclerosis means youre at greater risk of a heart attack or stroke. Aspirin can help prevent these from happening.  How atherosclerosis affects your arteries   A fatty material (plaque) can build up in your arteries. This makes it harder for blood to flow through them. A blood clot can then form on the plaque. This may block the artery, cutting off blood flow. This can cause conditions such as coronary artery disease (CAD) and peripheral arterial disease (PAD):  · CAD occurs when plaque builds up in the coronary artery. This artery supplies the heart with oxygen-rich blood.  · PAD occurs when plaque forms in leg arteries.  The same things that cause CAD and PAD can also cause plaque to form in other arteries in your body, such as those in the brain. When plaque occurs in any of these arteries, it raises your risk of heart attack or stroke.  What aspirin does  Aspirin is a blood-thinner (antiplatelet medicine). It helps keep blood clots from forming. This reduces the risk of blockage. Aspirin can be taken daily if you are at high risk of or have already had a heart attack or stroke. It is also used after a procedure called a stent placement. This is when a tiny wire mesh tube, or stent, is placed in an artery to keep it open. Aspirin helps prevent blood clots from forming on the stent.  Taking aspirin safely  Tell your healthcare provider about any medicines you are taking. This includes:  · All prescription medicines  · Over-the-counter medicines  · Herbs, vitamins, and other supplements  Also mention if you have a history of ulcers or bleeding problems. Ask whether you will need to stop taking aspirin before having surgery or dental work. Always take medicines as directed.  Tips for taking  aspirin  · Have a routine. For example, take aspirin with the same meal each day.  · Dont take more than prescribed. A low dose gives the same benefit as a higher one, with a lower risk of side effects.  · Dont skip doses. Aspirin needs to be taken each day to work well.  · Keep track of what you take. A pillbox with days of the week can help, especially if you take several medicines. Or use a list or chart to keep track.  When to call your healthcare provider  Side effects of aspirin are not usually serious. If you do have problems, changing your dose may help. Call your healthcare provider if you have any of the following:  · Excessive bruising (some bruising is normal)  · Nosebleeds, bleeding gums, or other excessive bleeding  · An upset stomach or stomach pain   Date Last Reviewed: 6/1/2016  © 1774-8777 [employer]. [address] [place] [address]. All rights reserved. This information is not intended as a substitute for professional medical care. Always follow your healthcare professional's instructions.        Smoking and Peripheral Arterial Disease (PAD)  Smoking is the greatest single danger to the health of your arteries. It puts you at higher risk for peripheral arterial disease (PAD). PAD is a disease of the arteries in the legs. If you have PAD, its likely that other parts of your body are diseased, too. That puts you at high risk for heart attack or stroke. Read on to learn how smoking can lead to PAD and affect your health.  How can smoking lead to PAD?  Smoking causes swelling and redness (inflammation) that leads to plaque forming. Plaque is a waxy material made up of cholesterol and other particles. It can build up in your artery walls. When there is too much plaque, your arteries can become narrowed and restrict blood flow. This then raises your risk for PAD and blood clots. It also worsens other risk factors, such as high blood pressure and high cholesterol. These are  things that make you more likely to have artery disease.  What happens if you dont quit smoking?  · You have 2 to 4 times the risk of dying from a heart attack or stroke as a nonsmoker.  · You have a greater risk of getting severe PAD, pain in your legs when walking (claudication), dead body tissue due to lack of blood flow (gangrene), or having a leg or foot amputated.  · You are at greater risk for abdominal aortic aneurysm (AAA). This is a bulge in the aorta, a major artery. It can burst suddenly and be fatal.  What happens if you quit smoking?  · Your risk for heart attack and stroke drops as soon as you quit smoking.  · After 1 year of not smoking, your risk for heart attack falls by 50%.  · In 5 to 15 years after you quit, your risk for heart attack or stroke is the same as someone who never smoked.  · Your risk for amputation and other complications of PAD is reduced.  · Your risk of developing AAA decreases.     For more information  · [employer] ANTIQUES [employer] CONSIGNMENTee.gov/pbdl-ld-pk-expert  · [facility] [place] [place] Smoking Quitline:6-607-11A-QUIT (2-353-012-1384)      Date Last Reviewed: 6/1/2016  © 0552-3478 [employer] [address] [place] [address], [place] [address]. All rights reserved. This information is not intended as a substitute for professional medical care. Always follow your healthcare professional's instructions.

## 2019-01-31 LAB
LEFT ABI: 1.32
LEFT ARM BP: 145 MMHG
LEFT DORSALIS PEDIS: 89 MMHG
LEFT POSTERIOR TIBIAL: 200 MMHG
LEFT TBI: 0.41
LEFT TOE PRESSURE: 63 MMHG
RIGHT ABI: 1.75
RIGHT ARM BP: 152 MMHG
RIGHT DORSALIS PEDIS: 163 MMHG
RIGHT POSTERIOR TIBIAL: 266 MMHG
RIGHT TBI: 0.41
RIGHT TOE PRESSURE: 63 MMHG

## 2019-02-27 RX ORDER — AMIODARONE HYDROCHLORIDE 200 MG/1
TABLET ORAL
Qty: 90 TABLET | Refills: 3 | Status: SHIPPED | OUTPATIENT
Start: 2019-02-27 | End: 2020-03-18

## 2019-04-02 RX ORDER — METOPROLOL TARTRATE 50 MG/1
TABLET ORAL
Qty: 180 TABLET | Refills: 3 | Status: SHIPPED | OUTPATIENT
Start: 2019-04-02 | End: 2020-02-26 | Stop reason: SDUPTHER

## 2019-04-29 ENCOUNTER — HOSPITAL ENCOUNTER (OUTPATIENT)
Dept: RADIOLOGY | Facility: HOSPITAL | Age: 79
Discharge: HOME | End: 2019-04-29
Attending: INTERNAL MEDICINE
Payer: MEDICARE

## 2019-04-29 DIAGNOSIS — I73.9 PAD (PERIPHERAL ARTERY DISEASE): ICD-10-CM

## 2019-04-29 PROCEDURE — 93925 LOWER EXTREMITY STUDY: CPT | Mod: TC,PO

## 2019-05-03 NOTE — PROGRESS NOTES
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

## 2019-05-07 ENCOUNTER — TELEPHONE (OUTPATIENT)
Dept: CARDIOLOGY | Facility: CLINIC | Age: 79
End: 2019-05-07

## 2019-06-26 ENCOUNTER — OFFICE VISIT (OUTPATIENT)
Dept: CARDIOLOGY | Facility: CLINIC | Age: 79
End: 2019-06-26
Payer: MEDICARE

## 2019-06-26 VITALS
SYSTOLIC BLOOD PRESSURE: 110 MMHG | HEART RATE: 68 BPM | WEIGHT: 186 LBS | DIASTOLIC BLOOD PRESSURE: 70 MMHG | BODY MASS INDEX: 28.19 KG/M2 | HEIGHT: 68 IN

## 2019-06-26 DIAGNOSIS — E78.2 MIXED HYPERLIPIDEMIA: ICD-10-CM

## 2019-06-26 DIAGNOSIS — Z72.0 TOBACCO USE: ICD-10-CM

## 2019-06-26 DIAGNOSIS — I73.9 PAD (PERIPHERAL ARTERY DISEASE): Primary | ICD-10-CM

## 2019-06-26 DIAGNOSIS — R09.89 BILATERAL CAROTID BRUITS: ICD-10-CM

## 2019-06-26 DIAGNOSIS — I48.0 PAF (PAROXYSMAL ATRIAL FIBRILLATION): ICD-10-CM

## 2019-06-26 PROCEDURE — 3074F SYST BP LT 130 MM HG: CPT | Mod: CPTII,S$GLB,, | Performed by: INTERNAL MEDICINE

## 2019-06-26 PROCEDURE — 99999 PR PBB SHADOW E&M-EST. PATIENT-LVL III: CPT | Mod: PBBFAC,,, | Performed by: INTERNAL MEDICINE

## 2019-06-26 PROCEDURE — 99215 OFFICE O/P EST HI 40 MIN: CPT | Mod: S$GLB,,, | Performed by: INTERNAL MEDICINE

## 2019-06-26 PROCEDURE — 3078F PR MOST RECENT DIASTOLIC BLOOD PRESSURE < 80 MM HG: ICD-10-PCS | Mod: CPTII,S$GLB,, | Performed by: INTERNAL MEDICINE

## 2019-06-26 PROCEDURE — 3078F DIAST BP <80 MM HG: CPT | Mod: CPTII,S$GLB,, | Performed by: INTERNAL MEDICINE

## 2019-06-26 PROCEDURE — 99999 PR PBB SHADOW E&M-EST. PATIENT-LVL III: ICD-10-PCS | Mod: PBBFAC,,, | Performed by: INTERNAL MEDICINE

## 2019-06-26 PROCEDURE — 99215 PR OFFICE/OUTPT VISIT, EST, LEVL V, 40-54 MIN: ICD-10-PCS | Mod: S$GLB,,, | Performed by: INTERNAL MEDICINE

## 2019-06-26 PROCEDURE — 3074F PR MOST RECENT SYSTOLIC BLOOD PRESSURE < 130 MM HG: ICD-10-PCS | Mod: CPTII,S$GLB,, | Performed by: INTERNAL MEDICINE

## 2019-06-26 NOTE — PATIENT INSTRUCTIONS
Kicking the Smoking Habit  If you smoke, quitting is one of the best changes you can make for your heart and your overall health. Your risk of heart attack goes down within one day of putting out that last cigarette. As you go longer without smoking, your risk goes down even more. Quitting isnt easy, but millions of people have done it. You can, too. Its never too late to quit.  Getting started  Boost your chances of success by deciding on your quit plan. Your health care provider and cardiac rehab team can help you develop this plan. Even if youve already quit, its easy to slip back into smoking.  Your plan can help you avoid and recover from relapse.  In any case, start by setting a date to quit within a month, and do it.    Keys to your quit plan  · Talk to your healthcare provider about prescription medicines and nicotine replacement products that help stop the urge to smoke.   · Join a support group or quit smoking program. Talking with others about the challenges of quitting can help you get through them.  · Ask other smokers in your household to quit with you.  · Look for the cues in your life that you associate with smoking and avoid them.  Track your triggers  What gives you that N-phak-h-cigarette feeling? List all the situations that make you want a cigarette. Then think of other ways to deal with these situations. Here are some examples:  Situation How I'll handle it   Finishing a meal Get up from the table and take a walk   Having an argument Find a quiet place and breathe deeply   Feeling lonely or bored Call a friend to talk         Tips for quitting successfully  · List the benefits of quitting such as reducing heart risks and saving money. Keep this list and review it whenever you feel like smoking.  · Get support. Let your friends know you may call them to chat when you have an urge to smoke.  · If youve tried to quit before without success, this time avoid the triggers that may cause  the relapse.  · Make the most of slip-ups. Try to learn from them, and then get back on track.  · Be accountable to your friends and your calendar so that you stay on track.  For family and friends  · Be supportive and patient. Quitting smoking can be difficult and stressful.  · If you smoke, nows a great time to quit. Even if you dont quit, never smoke around your loved one. Secondhand smoke is dangerous to his or her heart.  · The best goals are accomplished in teams. Remember that when your loved one states he or she wants to stop smoking.  Date Last Reviewed: 7/1/2016  © 6460-0730 [employer]. [address] [place] [address], [place] [address]. All rights reserved. This information is not intended as a substitute for professional medical care. Always follow your healthcare professional's instructions.

## 2019-06-26 NOTE — PROGRESS NOTES
Subjective:   Patient ID:  Yanni Car is a 78 y.o. female who presents to establish care for  Peripheral Arterial Disease; Hyperlipidemia; Hypertension; Claudication; and tobacco use      HPI:       Yanni Car 78 y.o. female is here follow up and complaining of persistent dafne IIb R calf claudication. She was previously under the care of Dr. Valerio. She had PAF several years ago during an hospitalization with SIRS. She was on on coumadin and amiodarone for 2 years.. She in NSR She has PAD s/p failed SVG R fem-pop in 8/2013 with bilateral SFA CTOs. She smokes and is very interested in quitting. She takes aspirin 81 mg daily. No CVA/TIA.       ECG 8/2018: sinus bradycardia       Afib was related to SIRS in 1/2012 while she was admitted for pneumonia    Carotid US 9/2016   Patent bilateral ICA   Patent L vertebral   Retrograde flow R vertebral concerning for steal syndrome    Event monitor 8/2016   No afib      Echo 2016:   Normal EF      S/p failed R reverse vein fem-pop done 8/2013   Failure within 2 to 4 weeks         JUDITH 10/2013     R 0.76   L 0.79      US 2013    R , pSFA 112, mSFA 259, dSFA 32, POP 48, PT 38, AT 27, DP 23  Failed fem-pop + R SFA       US 4/2019   Failed R fem-pop   Bilateral SFA CTOs            JUDITH 1/2019     R NC with TBI 0.41   L 1.28 with TBI 0.41   Monophasic waveforms bilaterally               Patient Active Problem List    Diagnosis Date Noted    Long term current use of anticoagulant therapy 08/18/2016    PAD (peripheral artery disease) 08/17/2016       S/p failed R reverse vein fem-pop done 8/2013   Failure within 2 to 4 weeks         JUDITH 10/2013     R 0.76   L 0.79      US 2013      R , pSFA 112, mSFA 259, dSFA 32, POP 48, PT 38, AT 27, DP 23  Failed fem-pop          JUDITH 1/2019     R NC with TBI 0.41   L 1.28 with TBI 0.41   Monophasic waveforms bilaterally       Tobacco use 08/17/2016    Hyperlipidemia 08/17/2016    PAF (paroxysmal atrial fibrillation)  2016     In NSR today-on amiodarone  [id number] 4 with 4% risk of yearly CVA    Afib was related to SIRS in 2012  She was admitted for pneumonia      2019:    Normal EF   Normal diastolic parameters   No valvular disease                  Bilateral carotid bruits 2016               R ICA  cm/sec   L ICA PSV 93 cm/sec   Retrograde R vertebral   Antegrade L vertebral             BCC (basal cell carcinoma), scalp/neck 2012    Post-operative state 2012    Mohs defect of eyelid 2012    Basal cell carcinoma of eyelid 09/10/2012    Canthal dystopia 09/10/2012    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   HENT: Negative for congestion.    Eyes: Negative for blurred vision, discharge and double vision.   Cardiovascular: Positive for leg swelling. Negative for chest pain, claudication (R >> L ), cyanosis, dyspnea on exertion, irregular heartbeat, near-syncope, orthopnea, palpitations, paroxysmal nocturnal dyspnea and syncope.   Respiratory: Negative for cough, shortness of breath and wheezing.    Endocrine: Negative for cold intolerance, heat intolerance and polyphagia.   Hematologic/Lymphatic: Negative for adenopathy and bleeding problem. Does not bruise/bleed easily.   Skin: Negative for dry skin and nail changes.   Musculoskeletal: Negative for arthritis, back pain, falls, joint pain, myalgias and neck pain.   Gastrointestinal: Negative for bloating, abdominal pain, change in bowel habit and constipation.   Genitourinary: Negative for bladder incontinence, dysuria, flank pain, genital sores and missed menses.   Neurological: Negative for aphonia, brief paralysis, difficulty with concentration, dizziness and weakness.   Psychiatric/Behavioral: Negative for altered mental status and memory  loss. The patient does not have insomnia.    Allergic/Immunologic: Negative for environmental allergies.       Objective:   Physical Exam   Constitutional: She is oriented to person, place, and time. She appears well-developed and well-nourished. She is not intubated.   HENT:   Head: Normocephalic and atraumatic.   Right Ear: External ear normal.   Left Ear: External ear normal.   Mouth/Throat: Oropharynx is clear and moist.   Eyes: Pupils are equal, round, and reactive to light. Conjunctivae and EOM are normal. Right eye exhibits no discharge. Left eye exhibits no discharge. No scleral icterus.   Neck: Normal range of motion. Neck supple. Normal carotid pulses, no hepatojugular reflux and no JVD present. Carotid bruit is not present. No tracheal deviation present. No thyromegaly present.   Cardiovascular: Normal rate, regular rhythm, S1 normal and S2 normal.  No extrasystoles are present. PMI is not displaced. Exam reveals no gallop, no S3, no distant heart sounds, no friction rub and no midsystolic click.   No murmur heard.  Pulses:       Carotid pulses are 2+ on the right side with bruit, and 2+ on the left side with bruit.       Radial pulses are 2+ on the right side, and 2+ on the left side.        Femoral pulses are 2+ on the right side, and 2+ on the left side.       Popliteal pulses are 2+ on the right side, and 2+ on the left side.        Dorsalis pedis pulses are 0 on the right side, and 0 on the left side.        Posterior tibial pulses are 0 on the right side, and 0 on the left side.   Monophasic R DP with triphasic R PT doppler signals     Biphasic L PT and DP doppler signals        Pulmonary/Chest: Effort normal and breath sounds normal. No accessory muscle usage or stridor. No apnea, no tachypnea and no bradypnea. She is not intubated. No respiratory distress. She has no decreased breath sounds. She has no wheezes. She has no rales. She exhibits no tenderness and no bony tenderness.   Abdominal: She  exhibits no distension, no pulsatile liver, no abdominal bruit, no ascites, no pulsatile midline mass and no mass. There is no tenderness. There is no rebound and no guarding.   Musculoskeletal: Normal range of motion. She exhibits no edema or tenderness.   Lymphadenopathy:     She has no cervical adenopathy.   Trace edema of bilateral ankles   Neurological: She is alert and oriented to person, place, and time. She has normal reflexes. No cranial nerve deficit. Coordination normal.   Skin: Skin is warm. No rash noted. No erythema. No pallor.       No wounds            Psychiatric: She has a normal mood and affect. Her behavior is normal. Judgment and thought content normal.       Assessment:     1. PAD (peripheral artery disease)    2. Mixed hyperlipidemia    3. PAF (paroxysmal atrial fibrillation)    4. Bilateral carotid bruits    5. Tobacco use        Plan:       Low salt diet  Exercise  Smoking cessation enrollment  Intense statin therapy      Yearly carotid ultrasound for bruit  Pletal 100 mg bid for claudication  If there is no improvement she will consider revascularization  Follow up in 6 months          Continue with current medical plan and lifestyle changes.  Return sooner for concerns or questions. If symptoms persist go to the ED  I have reviewed all pertinent data on this patient       I have reviewed the patient's medical history in detail and updated the computerized patient record.    Orders Placed This Encounter   Procedures    Ambulatory referral to Smoking Cessation Program     Referral Priority:   Routine     Referral Type:   Consultation     Referral Reason:   Specialty Services Required     Requested Specialty:   CTTS     Number of Visits Requested:   1    CV Exercise JUDITH     Standing Status:   Future     Standing Expiration Date:   6/26/2020       Follow up as scheduled. Return sooner for concerns or questions            She expressed verbal understanding and agreed with the  plan        Greater than 50% of the visit of 45 minutes was spent counseling, educating, and coordinating the care of the patient.      -In today's visit, at least 4 established conditions that pose a risk to life or bodily function have been addressed and the conditions are severe.    -In today's visit, monitoring for drug toxicity was accomplished.            Patient's Medications   New Prescriptions    No medications on file   Previous Medications    AMIODARONE (PACERONE) 200 MG TAB    TAKE 1 TABLET BY MOUTH EVERY DAY    AMLODIPINE (NORVASC) 10 MG TABLET    TAKE 1 TABLET BY MOUTH EVERY DAY    ASPIRIN (ECOTRIN) 81 MG EC TABLET    Take 81 mg by mouth once daily.      CALCIUM-VITAMIN D3 (CALCIUM 500 + D) 500 MG(1,250MG) -200 UNIT PER TABLET    Take 1 tablet by mouth 2 (two) times daily with meals.    CILOSTAZOL (PLETAL) 100 MG TAB    Take 1 tablet (100 mg total) by mouth 2 (two) times daily.    FISH OIL-OMEGA-3 FATTY ACIDS 300-1,000 MG CAPSULE    Take 2 g by mouth once daily.      FUROSEMIDE (LASIX) 20 MG TABLET    TAKE 1 TABLET BY MOUTH EVERY DAY    METOPROLOL TARTRATE (LOPRESSOR) 50 MG TABLET    TAKE 1 TABLET BY MOUTH TWO TIMES A DAY    RESTASIS 0.05 % OPHTHALMIC EMULSION        ROSUVASTATIN (CRESTOR) 10 MG TABLET    Take 1 tablet (10 mg total) by mouth every evening.   Modified Medications    No medications on file   Discontinued Medications    No medications on file

## 2019-09-04 RX ORDER — FUROSEMIDE 20 MG/1
TABLET ORAL
Qty: 90 TABLET | Refills: 3 | Status: SHIPPED | OUTPATIENT
Start: 2019-09-04 | End: 2020-06-23

## 2019-10-02 RX ORDER — ROSUVASTATIN CALCIUM 10 MG/1
TABLET, COATED ORAL
Qty: 90 TABLET | Refills: 3 | Status: SHIPPED | OUTPATIENT
Start: 2019-10-02 | End: 2021-01-04

## 2019-12-27 ENCOUNTER — TELEPHONE (OUTPATIENT)
Dept: CARDIOLOGY | Facility: CLINIC | Age: 79
End: 2019-12-27

## 2019-12-27 NOTE — TELEPHONE ENCOUNTER
I returned patient call regarding an appointment.    Mrs. Car  ,  wants you to do   CV- Exercise JUDITH's(Ultra-sound) before he will see you in Febraury 2020.    Orders are in if you want to schedule at [phone].    Or call me and I will help assist with scheduling.        Lucy Sanchez (rita).                          ----- Message from Lita Barber sent at 12/27/2019  1:59 PM CST -----  Contact: [phone]/self  Patient would like to be seen sooner than the next available appointment. Please advise.

## 2019-12-30 ENCOUNTER — TELEPHONE (OUTPATIENT)
Dept: CARDIOLOGY | Facility: CLINIC | Age: 79
End: 2019-12-30

## 2019-12-30 NOTE — TELEPHONE ENCOUNTER
I returned and spoke to Josep Santa, and we scheduled her the test (JUDITH) needed prior to seeing    In February.    Sincerely ,    Lucy Sanchez (rita).                        ----- Message from Josy Forte sent at 12/30/2019 11:31 AM CST -----  Contact: [phone]/self  Type:  Patient Returning Call    Who Called: pt  Who Left Message for Patient: Matilda  Does the patient know what this is regarding?: Scheduling a test/procedure  Would the patient rather a call back or a response via [facility]? call  Best Call Back Number: [phone]  Additional Information: n/a

## 2020-02-11 ENCOUNTER — HOSPITAL ENCOUNTER (OUTPATIENT)
Dept: CARDIOLOGY | Facility: HOSPITAL | Age: 80
Discharge: HOME | End: 2020-02-11
Attending: INTERNAL MEDICINE
Payer: MEDICARE

## 2020-02-11 DIAGNOSIS — I73.9 PAD (PERIPHERAL ARTERY DISEASE): ICD-10-CM

## 2020-02-11 PROCEDURE — 93922 UPR/L XTREMITY ART 2 LEVELS: CPT | Mod: 26,,, | Performed by: INTERNAL MEDICINE

## 2020-02-11 PROCEDURE — 93924 LWR XTR VASC STDY BILAT: CPT | Mod: 50,PO

## 2020-02-11 PROCEDURE — 93922 UPR/L XTREMITY ART 2 LEVELS: CPT | Mod: 50,PO

## 2020-02-11 PROCEDURE — 93922 CV US ANKLE BRACHIAL INDICES EXT LTD WO STRESS (CUPID ONLY): ICD-10-PCS | Mod: 26,,, | Performed by: INTERNAL MEDICINE

## 2020-02-12 LAB
LEFT ARM BP: 167 MMHG
RIGHT ARM BP: 167 MMHG

## 2020-02-12 RX ORDER — CILOSTAZOL 100 MG/1
TABLET ORAL
Qty: 180 TABLET | Refills: 3 | Status: SHIPPED | OUTPATIENT
Start: 2020-02-12 | End: 2021-04-29

## 2020-02-26 ENCOUNTER — OFFICE VISIT (OUTPATIENT)
Dept: CARDIOLOGY | Facility: CLINIC | Age: 80
End: 2020-02-26
Payer: MEDICARE

## 2020-02-26 VITALS
SYSTOLIC BLOOD PRESSURE: 170 MMHG | WEIGHT: 187.69 LBS | BODY MASS INDEX: 28.44 KG/M2 | HEART RATE: 61 BPM | HEIGHT: 68 IN | OXYGEN SATURATION: 95 % | DIASTOLIC BLOOD PRESSURE: 72 MMHG

## 2020-02-26 DIAGNOSIS — Z72.0 TOBACCO USE: ICD-10-CM

## 2020-02-26 DIAGNOSIS — R09.89 BILATERAL CAROTID BRUITS: ICD-10-CM

## 2020-02-26 DIAGNOSIS — I48.0 PAF (PAROXYSMAL ATRIAL FIBRILLATION): ICD-10-CM

## 2020-02-26 DIAGNOSIS — I73.9 PAD (PERIPHERAL ARTERY DISEASE): Primary | ICD-10-CM

## 2020-02-26 DIAGNOSIS — E78.2 MIXED HYPERLIPIDEMIA: ICD-10-CM

## 2020-02-26 PROCEDURE — 1101F PR PT FALLS ASSESS DOC 0-1 FALLS W/OUT INJ PAST YR: ICD-10-PCS | Mod: CPTII,S$GLB,, | Performed by: INTERNAL MEDICINE

## 2020-02-26 PROCEDURE — 99215 OFFICE O/P EST HI 40 MIN: CPT | Mod: S$GLB,,, | Performed by: INTERNAL MEDICINE

## 2020-02-26 PROCEDURE — 3078F DIAST BP <80 MM HG: CPT | Mod: CPTII,S$GLB,, | Performed by: INTERNAL MEDICINE

## 2020-02-26 PROCEDURE — 99999 PR PBB SHADOW E&M-EST. PATIENT-LVL III: CPT | Mod: PBBFAC,,, | Performed by: INTERNAL MEDICINE

## 2020-02-26 PROCEDURE — 1101F PT FALLS ASSESS-DOCD LE1/YR: CPT | Mod: CPTII,S$GLB,, | Performed by: INTERNAL MEDICINE

## 2020-02-26 PROCEDURE — 1159F MED LIST DOCD IN RCRD: CPT | Mod: S$GLB,,, | Performed by: INTERNAL MEDICINE

## 2020-02-26 PROCEDURE — 1126F AMNT PAIN NOTED NONE PRSNT: CPT | Mod: S$GLB,,, | Performed by: INTERNAL MEDICINE

## 2020-02-26 PROCEDURE — 3078F PR MOST RECENT DIASTOLIC BLOOD PRESSURE < 80 MM HG: ICD-10-PCS | Mod: CPTII,S$GLB,, | Performed by: INTERNAL MEDICINE

## 2020-02-26 PROCEDURE — 3077F SYST BP >= 140 MM HG: CPT | Mod: CPTII,S$GLB,, | Performed by: INTERNAL MEDICINE

## 2020-02-26 PROCEDURE — 99215 PR OFFICE/OUTPT VISIT, EST, LEVL V, 40-54 MIN: ICD-10-PCS | Mod: S$GLB,,, | Performed by: INTERNAL MEDICINE

## 2020-02-26 PROCEDURE — 1159F PR MEDICATION LIST DOCUMENTED IN MEDICAL RECORD: ICD-10-PCS | Mod: S$GLB,,, | Performed by: INTERNAL MEDICINE

## 2020-02-26 PROCEDURE — 1126F PR PAIN SEVERITY QUANTIFIED, NO PAIN PRESENT: ICD-10-PCS | Mod: S$GLB,,, | Performed by: INTERNAL MEDICINE

## 2020-02-26 PROCEDURE — 99999 PR PBB SHADOW E&M-EST. PATIENT-LVL III: ICD-10-PCS | Mod: PBBFAC,,, | Performed by: INTERNAL MEDICINE

## 2020-02-26 PROCEDURE — 3077F PR MOST RECENT SYSTOLIC BLOOD PRESSURE >= 140 MM HG: ICD-10-PCS | Mod: CPTII,S$GLB,, | Performed by: INTERNAL MEDICINE

## 2020-02-26 RX ORDER — AMLODIPINE BESYLATE 10 MG/1
10 TABLET ORAL DAILY
Qty: 90 TABLET | Refills: 3 | Status: CANCELLED | OUTPATIENT
Start: 2020-02-26

## 2020-02-26 RX ORDER — METOPROLOL TARTRATE 50 MG/1
50 TABLET ORAL 2 TIMES DAILY
Qty: 180 TABLET | Refills: 3 | Status: SHIPPED | OUTPATIENT
Start: 2020-02-26 | End: 2020-09-10

## 2020-02-26 RX ORDER — AMLODIPINE BESYLATE 10 MG/1
10 TABLET ORAL DAILY
Qty: 90 TABLET | Refills: 3 | Status: SHIPPED | OUTPATIENT
Start: 2020-02-26 | End: 2021-04-14

## 2020-02-26 NOTE — PROGRESS NOTES
Subjective:   Patient ID:  Yanni Car is a 79 y.o. female who presents to establish care for  Coronary Artery Disease; Carotid Artery Disease; Heartburn; and Peripheral Arterial Disease      HPI:       Yanni Car 79 y.o. female is here follow up and complaining of persistent dafne IIb R calf claudication. She was previously under the care of Dr. Valerio. She had PAF several years ago during an hospitalization with SIRS. She was on on coumadin and amiodarone for 2 years. She in NSR She has PAD s/p failed SVG R fem-pop in 8/2013 with bilateral SFA CTOs. She smokes and is very interested in quitting. She takes aspirin 81 mg daily. No CVA/TIA.       ECG 8/2018: sinus bradycardia       Afib was related to SIRS in 1/2012 while she was admitted for pneumonia    Carotid US 9/2016   Patent bilateral ICA   Patent L vertebral   Retrograde flow R vertebral concerning for steal syndrome    Event monitor 8/2016   No afib      Echo 2016:   Normal EF      S/p failed R reverse vein fem-pop done 8/2013   Failure within 2 to 4 weeks         JUDITH 10/2013     R 0.76   L 0.79      US 2013    R , pSFA 112, mSFA 259, dSFA 32, POP 48, PT 38, AT 27, DP 23  Failed fem-pop + R SFA       US 4/2019   Failed R fem-pop   Bilateral SFA CTOs            JUDITH 1/2019     R NC with TBI 0.41   L 1.28 with TBI 0.41   Monophasic waveforms bilaterally       JUDITH 2/2020     NC vessels bilaterally                 Patient Active Problem List    Diagnosis Date Noted    Long term current use of anticoagulant therapy 08/18/2016    PAD (peripheral artery disease) 08/17/2016       S/p failed R reverse vein fem-pop done 8/2013   Failure within 2 to 4 weeks         JUDITH 10/2013     R 0.76   L 0.79      US 2013      R , pSFA 112, mSFA 259, dSFA 32, POP 48, PT 38, AT 27, DP 23  Failed fem-pop          JUDITH 1/2019     R NC with TBI 0.41   L 1.28 with TBI 0.41   Monophasic waveforms bilaterally       Tobacco use 08/17/2016    Hyperlipidemia  2016    PAF (paroxysmal atrial fibrillation) 2016     In NSR today-on amiodarone  [id number] 4 with 4% risk of yearly CVA    Afib was related to SIRS in 2012  She was admitted for pneumonia      2019:    Normal EF   Normal diastolic parameters   No valvular disease                  Bilateral carotid bruits 2016               R ICA  cm/sec   L ICA PSV 93 cm/sec   Retrograde R vertebral   Antegrade L vertebral             BCC (basal cell carcinoma), scalp/neck 2012    Post-operative state 2012    Mohs defect of eyelid 2012    Basal cell carcinoma of eyelid 09/10/2012    Canthal dystopia 09/10/2012    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   HENT: Negative for congestion.    Eyes: Negative for blurred vision, discharge and double vision.   Cardiovascular: Positive for leg swelling. Negative for chest pain, claudication (R >> L ), cyanosis, dyspnea on exertion, irregular heartbeat, near-syncope, orthopnea, palpitations, paroxysmal nocturnal dyspnea and syncope.   Respiratory: Negative for cough, shortness of breath and wheezing.    Endocrine: Negative for cold intolerance, heat intolerance and polyphagia.   Hematologic/Lymphatic: Negative for adenopathy and bleeding problem. Does not bruise/bleed easily.   Skin: Negative for dry skin and nail changes.   Musculoskeletal: Negative for arthritis, back pain, falls, joint pain, myalgias and neck pain.   Gastrointestinal: Negative for bloating, abdominal pain, change in bowel habit and constipation.   Genitourinary: Negative for bladder incontinence, dysuria, flank pain, genital sores and missed menses.   Neurological: Negative for aphonia, brief paralysis, difficulty with concentration, dizziness and weakness.    Psychiatric/Behavioral: Negative for altered mental status and memory loss. The patient does not have insomnia.    Allergic/Immunologic: Negative for environmental allergies.       Objective:   Physical Exam   Constitutional: She is oriented to person, place, and time. She appears well-developed and well-nourished. She is not intubated.   HENT:   Head: Normocephalic and atraumatic.   Right Ear: External ear normal.   Left Ear: External ear normal.   Mouth/Throat: Oropharynx is clear and moist.   Eyes: Pupils are equal, round, and reactive to light. Conjunctivae and EOM are normal. Right eye exhibits no discharge. Left eye exhibits no discharge. No scleral icterus.   Neck: Normal range of motion. Neck supple. Normal carotid pulses, no hepatojugular reflux and no JVD present. Carotid bruit is not present. No tracheal deviation present. No thyromegaly present.   Cardiovascular: Normal rate, regular rhythm, S1 normal and S2 normal.  No extrasystoles are present. PMI is not displaced. Exam reveals no gallop, no S3, no distant heart sounds, no friction rub and no midsystolic click.   No murmur heard.  Pulses:       Carotid pulses are 2+ on the right side with bruit, and 2+ on the left side with bruit.       Radial pulses are 2+ on the right side, and 2+ on the left side.        Femoral pulses are 2+ on the right side, and 2+ on the left side.       Popliteal pulses are 2+ on the right side, and 2+ on the left side.        Dorsalis pedis pulses are 0 on the right side, and 0 on the left side.        Posterior tibial pulses are 0 on the right side, and 0 on the left side.   Monophasic R DP with triphasic R PT doppler signals     Biphasic L PT and DP doppler signals        Pulmonary/Chest: Effort normal and breath sounds normal. No accessory muscle usage or stridor. No apnea, no tachypnea and no bradypnea. She is not intubated. No respiratory distress. She has no decreased breath sounds. She has no wheezes. She has no  rales. She exhibits no tenderness and no bony tenderness.   Abdominal: She exhibits no distension, no pulsatile liver, no abdominal bruit, no ascites, no pulsatile midline mass and no mass. There is no tenderness. There is no rebound and no guarding.   Musculoskeletal: Normal range of motion. She exhibits no edema or tenderness.   Lymphadenopathy:     She has no cervical adenopathy.   Trace edema of bilateral ankles   Neurological: She is alert and oriented to person, place, and time. She has normal reflexes. No cranial nerve deficit. Coordination normal.   Skin: Skin is warm. No rash noted. No erythema. No pallor.       No wounds            Psychiatric: She has a normal mood and affect. Her behavior is normal. Judgment and thought content normal.       Assessment:     1. PAD (peripheral artery disease)    2. Mixed hyperlipidemia    3. PAF (paroxysmal atrial fibrillation)    4. Bilateral carotid bruits    5. Tobacco use        Plan:       Low salt diet  Exercise  Smoking cessation enrollment  Intense statin therapy    Resume norvasc 10 mg daily   Take lopressor 50 mg bid as prescribed and not once daily   Target BP < 130/80 mmHg      Yearly carotid ultrasound for bruit  Pletal 100 mg bid for claudication  If there is no improvement she will consider revascularization  Follow up in a year       Smoking cessation was discussed  She is not interested in quitting smoking         Continue with current medical plan and lifestyle changes.  Return sooner for concerns or questions. If symptoms persist go to the ED  I have reviewed all pertinent data on this patient       I have reviewed the patient's medical history in detail and updated the computerized patient record.    Orders Placed This Encounter   Procedures    Lipid panel     Standing Status:   Future     Standing Expiration Date:   4/26/2021    Comprehensive metabolic panel     Standing Status:   Future     Standing Expiration Date:   4/26/2021       Follow up as  scheduled. Return sooner for concerns or questions            She expressed verbal understanding and agreed with the plan        Greater than 50% of the visit of 45 minutes was spent counseling, educating, and coordinating the care of the patient.      -In today's visit, at least 4 established conditions that pose a risk to life or bodily function have been addressed and the conditions are severe.    -In today's visit, monitoring for drug toxicity was accomplished.            Patient's Medications   New Prescriptions    No medications on file   Previous Medications    AMIODARONE (PACERONE) 200 MG TAB    TAKE 1 TABLET BY MOUTH EVERY DAY    AMLODIPINE (NORVASC) 10 MG TABLET    TAKE 1 TABLET BY MOUTH EVERY DAY    ASPIRIN (ECOTRIN) 81 MG EC TABLET    Take 81 mg by mouth once daily.      CALCIUM-VITAMIN D3 (CALCIUM 500 + D) 500 MG(1,250MG) -200 UNIT PER TABLET    Take 1 tablet by mouth 2 (two) times daily with meals.    CILOSTAZOL (PLETAL) 100 MG TAB    TAKE 1 TABLET BY MOUTH TWO TIMES A DAY    FISH OIL-OMEGA-3 FATTY ACIDS 300-1,000 MG CAPSULE    Take 2 g by mouth once daily.      FUROSEMIDE (LASIX) 20 MG TABLET    TAKE 1 TABLET BY MOUTH EVERY DAY    METOPROLOL TARTRATE (LOPRESSOR) 50 MG TABLET    TAKE 1 TABLET BY MOUTH TWO TIMES A DAY    RESTASIS 0.05 % OPHTHALMIC EMULSION        ROSUVASTATIN (CRESTOR) 10 MG TABLET    Take 1 tablet (10 mg total) by mouth every evening.    ROSUVASTATIN (CRESTOR) 10 MG TABLET    TAKE 1 TABLET BY MOUTH EVERY EVENING   Modified Medications    No medications on file   Discontinued Medications    No medications on file

## 2020-02-26 NOTE — PATIENT INSTRUCTIONS
Smoking and Peripheral Arterial Disease (PAD)  Smoking is the greatest single danger to the health of your arteries. It puts you at higher risk for peripheral arterial disease (PAD). PAD is a disease of the arteries in the legs. If you have PAD, its likely that other parts of your body are diseased, too. That puts you at high risk for heart attack or stroke. Read on to learn how smoking can lead to PAD and affect your health.  How can smoking lead to PAD?  Smoking causes swelling and redness (inflammation) that leads to plaque forming. Plaque is a waxy material made up of cholesterol and other particles. It can build up in your artery walls. When there is too much plaque, your arteries can become narrowed and restrict blood flow. This then raises your risk for PAD and blood clots. It also worsens other risk factors, such as high blood pressure and high cholesterol. These are things that make you more likely to have artery disease.  What happens if you dont quit smoking?  · You have 2 to 4 times the risk of dying from a heart attack or stroke as a nonsmoker.  · You have a greater risk of getting severe PAD, pain in your legs when walking (claudication), dead body tissue due to lack of blood flow (gangrene), or having a leg or foot amputated.  · You are at greater risk for abdominal aortic aneurysm (AAA). This is a bulge in the aorta, a major artery. It can burst suddenly and be fatal.  What happens if you quit smoking?  · Your risk for heart attack and stroke drops as soon as you quit smoking.  · After 1 year of not smoking, your risk for heart attack falls by 50%.  · In 5 to 15 years after you quit, your risk for heart attack or stroke is the same as someone who never smoked.  · Your risk for amputation and other complications of PAD is reduced.  · Your risk of developing AAA decreases.     For more information  · Smokefree.gov/ephe-ll-oc-expert  · National Cancer [place] Smoking Quitline:8-375-27W-QUIT  (3-133-350-1312)      Date Last Reviewed: 6/1/2016  © 2902-3170 [employer]. [address] [place] [address], [place] [address]. All rights reserved. This information is not intended as a substitute for professional medical care. Always follow your healthcare professional's instructions.

## 2020-02-27 ENCOUNTER — LAB VISIT (OUTPATIENT)
Dept: LAB | Facility: HOSPITAL | Age: 80
End: 2020-02-27
Attending: INTERNAL MEDICINE
Payer: MEDICARE

## 2020-02-27 DIAGNOSIS — E78.2 MIXED HYPERLIPIDEMIA: ICD-10-CM

## 2020-02-27 LAB
CHOLEST SERPL-MCNC: 159 MG/DL (ref 120–199)
CHOLEST/HDLC SERPL: 2.7 {RATIO} (ref 2–5)
HDLC SERPL-MCNC: 60 MG/DL (ref 40–75)
HDLC SERPL: 37.7 % (ref 20–50)
LDLC SERPL CALC-MCNC: 81.4 MG/DL (ref 63–159)
NONHDLC SERPL-MCNC: 99 MG/DL
TRIGL SERPL-MCNC: 88 MG/DL (ref 30–150)

## 2020-02-27 PROCEDURE — 36415 COLL VENOUS BLD VENIPUNCTURE: CPT | Mod: PO

## 2020-02-27 PROCEDURE — 80061 LIPID PANEL: CPT

## 2020-03-18 RX ORDER — AMIODARONE HYDROCHLORIDE 200 MG/1
TABLET ORAL
Qty: 90 TABLET | Refills: 3 | Status: SHIPPED | OUTPATIENT
Start: 2020-03-18 | End: 2021-04-29

## 2020-06-17 ENCOUNTER — HOSPITAL ENCOUNTER (EMERGENCY)
Facility: HOSPITAL | Age: 80
Discharge: HOME | End: 2020-06-17
Attending: EMERGENCY MEDICINE
Payer: MEDICARE

## 2020-06-17 VITALS
DIASTOLIC BLOOD PRESSURE: 62 MMHG | SYSTOLIC BLOOD PRESSURE: 137 MMHG | BODY MASS INDEX: 28.83 KG/M2 | TEMPERATURE: 99 F | HEIGHT: 68 IN | OXYGEN SATURATION: 94 % | WEIGHT: 190.25 LBS | RESPIRATION RATE: 16 BRPM | HEART RATE: 64 BPM

## 2020-06-17 DIAGNOSIS — S30.0XXA CONTUSION, BUTTOCK, INITIAL ENCOUNTER: Primary | ICD-10-CM

## 2020-06-17 DIAGNOSIS — M25.552 ACUTE HIP PAIN, LEFT: ICD-10-CM
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
## 2020-06-17 PROCEDURE — 99284 EMERGENCY DEPT VISIT MOD MDM: CPT | Mod: 25,ER

## 2020-06-17 PROCEDURE — 25000003 PHARM REV CODE 250: Mod: ER | Performed by: PHYSICIAN ASSISTANT

## 2020-06-17 RX ORDER — ACETAMINOPHEN 325 MG/1
650 TABLET ORAL
Status: COMPLETED | OUTPATIENT
Start: 2020-06-17 | End: 2020-06-17

## 2020-06-17 RX ADMIN — ACETAMINOPHEN 650 MG: 325 TABLET ORAL at 04:06

## 2020-06-17 NOTE — DISCHARGE INSTRUCTIONS
Return to the ED for severe pain, numbness, weakness or if worse in any way. Take tylenol for pain

## 2020-06-17 NOTE — ED PROVIDER NOTES
Encounter Date: 6/17/2020       History     Chief Complaint   Patient presents with    Tailbone Pain     fall on ceramic floor     Patient is a 79 year old female with multiple comorbidities. She is presenting after a trip and fall at home. She reports her shoe got stuck and she fell to onto her buttocks. She is complaining of constant, mild aching pain and intermittent sharp pain in the left buttocks and hip. The pain is worse with movement. No radicular symptoms. No numbness or focal weakness. She arrived via EMS. No treatment prior to arrival.         Review of patient's allergies indicates:   Allergen Reactions    Penicillins Swelling     Past Medical History:   Diagnosis Date    Arthritis     Basal cell carcinoma     Hypertension      Past Surgical History:   Procedure Laterality Date    HYSTERECTOMY      mohs repair       Family History   Problem Relation Age of Onset    Cancer Father     Amblyopia Neg Hx     Blindness Neg Hx     Cataracts Neg Hx     Diabetes Neg Hx     Glaucoma Neg Hx     Hypertension Neg Hx     Macular degeneration Neg Hx     Retinal detachment Neg Hx     Strabismus Neg Hx     Stroke Neg Hx     Thyroid disease Neg Hx      Social History     Tobacco Use    Smoking status: Current Every Day Smoker   Substance Use Topics    Alcohol use: No    Drug use: Not on file     Review of Systems   Constitutional: Negative for activity change, appetite change, chills, fatigue and fever.   HENT: Negative for congestion, ear pain, rhinorrhea, sore throat and voice change.    Respiratory: Negative for cough, shortness of breath and wheezing.    Cardiovascular: Negative for chest pain.   Gastrointestinal: Negative for abdominal pain, rectal pain and vomiting.   Genitourinary: Negative for flank pain.   Musculoskeletal: Positive for back pain. Negative for neck pain and neck stiffness.        + buttock pain   Skin: Negative for rash.   Neurological: Negative for dizziness, syncope,  weakness, numbness and headaches.        No head injury   All other systems reviewed and are negative.      Physical Exam     Initial Vitals [06/17/20 1628]   BP Pulse Resp Temp SpO2   (!) 156/69 (!) 59 16 98.3 °F (36.8 °C) (!) 94 %      MAP       --         Physical Exam    Nursing note and vitals reviewed.  Constitutional: She appears well-developed and well-nourished. She appears distressed.   HENT:   Head: Normocephalic and atraumatic.   Nose: Nose normal.   Mouth/Throat: Oropharynx is clear and moist.   Eyes: Conjunctivae and EOM are normal. Pupils are equal, round, and reactive to light.   Neck: Normal range of motion. Neck supple.   No tenderness   Cardiovascular: Normal rate, regular rhythm, normal heart sounds and intact distal pulses.   Pulmonary/Chest: Breath sounds normal. No respiratory distress.   Abdominal: Soft. There is no abdominal tenderness.   Musculoskeletal:      Comments: No midline or spinous tenderness. Tenderness to palpation over the left buttock and left lateral hip. No pain with ROM of the left hip. No tenderness or pain with ROM in the right hip. No swelling or deformity to the bilateral upper and lower extremities.    Lymphadenopathy:     She has no cervical adenopathy.   Neurological: She is alert and oriented to person, place, and time. She has normal strength. No sensory deficit.   Skin: Skin is warm and dry.   No abrasions or lacerations.    Psychiatric: She has a normal mood and affect. Her behavior is normal. Judgment and thought content normal.         ED Course   Procedures  Labs Reviewed - No data to display       Imaging Results          X-Ray Hip 2 View Left (Final result)  Result time 06/17/20 16:58:06    Final result by Bar Still III, MD (06/17/20 16:58:06)                 Impression:      Degenerative changes about the hips but no acute bony abnormality is suggested.  No acute fracture or dislocation.  Pelvis is intact.      Electronically signed by: Bar  MD Tessy  Date:    06/17/2020  Time:    16:58             Narrative:    EXAMINATION:  XR HIP 2 VIEW LEFT    CLINICAL HISTORY:  Pain in left hip    COMPARISON:  None    FINDINGS:  No fracture.  No dislocation.  Bony pelvis is grossly intact without fracture or diastasis.  There are degenerative changes about both hips.  No lytic or blastic change.  Vascular calcifications are noted.                               X-Ray Lumbar Spine Ap And Lateral (Final result)  Result time 06/17/20 16:58:49    Final result by Giovanni Chaves MD (06/17/20 16:58:49)                 Impression:      No acute radiographic abnormality of the lumbar spine.    Multilevel degenerative changes as detailed in the body of the report.      Electronically signed by: Giovanni Chaves  Date:    06/17/2020  Time:    16:58             Narrative:    EXAMINATION:  XR LUMBAR SPINE AP AND LATERAL    CLINICAL HISTORY:  Back pain or radiculopathy, trauma;    TECHNIQUE:  AP, lateral and spot images were performed of the lumbar spine.    COMPARISON:  None    FINDINGS:  Five non-rib-bearing lumbar type vertebral bodies.  Mild levoscoliotic curvature of lumbar spine.  There is left lateral subluxation L3 on L4.  Lumbar vertebral body heights appear maintained.  Disc height loss noted at L4-5 L5-S1 with associated facet degenerative changes.  No acute fracture.  Left iliac vascular stent identified.  Heavy aortic atherosclerotic calcification.                                 Medical Decision Making:   Clinical Tests:   Radiological Study: Ordered and Reviewed  No acute findings on L-spine or LEft hip xray. Patient was feeling better with tylenol. Follow up with PCP. Return to the ED if worse in any way.                                  Clinical Impression:       ICD-10-CM ICD-9-CM   1. Contusion, buttock, initial encounter  S30.0XXA 922.32   2. Fall  W19.XXXA E888.9   3. Acute hip pain, left  M25.552 719.45         Disposition:   Disposition:  Discharged     ED Disposition Condition    Discharge Stable        ED Prescriptions     None        Follow-up Information     Follow up With Specialties Details Why Contact Info    Rajni No MD Family Medicine In 3 days  [address]   [place] [address]  [phone]                                       DANG Caballero  06/17/20 172       DANG Caballero  06/17/20 1727

## 2020-06-17 NOTE — ED NOTES
Updated patient's , Kristin Car outside patient's condition per patient request. Patient reports xrays did cause pain with positioning. Denies discomfort at present.

## 2020-06-23 ENCOUNTER — HOSPITAL ENCOUNTER (OUTPATIENT)
Facility: HOSPITAL | Age: 80
LOS: 1 days | Discharge: NURSING, REHAB OR LONG-TERM CARE | End: 2020-06-25
Attending: EMERGENCY MEDICINE | Admitting: INTERNAL MEDICINE
Payer: MEDICARE

## 2020-06-23 DIAGNOSIS — K83.8 COMMON BILE DUCT DILATATION: ICD-10-CM

## 2020-06-23 DIAGNOSIS — S32.039A CLOSED FRACTURE OF THIRD LUMBAR VERTEBRA, UNSPECIFIED FRACTURE MORPHOLOGY, INITIAL ENCOUNTER: ICD-10-CM

## 2020-06-23 DIAGNOSIS — T14.8XXA ACUTE FRACTURE: ICD-10-CM

## 2020-06-23 DIAGNOSIS — I48.0 PAROXYSMAL A-FIB: ICD-10-CM

## 2020-06-23 DIAGNOSIS — S32.415A CLOSED NONDISPLACED FRACTURE OF ANTERIOR WALL OF LEFT ACETABULUM, INITIAL ENCOUNTER: ICD-10-CM

## 2020-06-23 DIAGNOSIS — S32.10XA CLOSED FRACTURE OF SACRUM AND COCCYX, INITIAL ENCOUNTER: Primary | ICD-10-CM

## 2020-06-23 DIAGNOSIS — I73.9 PAD (PERIPHERAL ARTERY DISEASE): ICD-10-CM

## 2020-06-23 DIAGNOSIS — Z72.0 TOBACCO USE: ICD-10-CM

## 2020-06-23 DIAGNOSIS — I10 ESSENTIAL HYPERTENSION: ICD-10-CM

## 2020-06-23 DIAGNOSIS — S32.2XXA CLOSED FRACTURE OF SACRUM AND COCCYX, INITIAL ENCOUNTER: Primary | ICD-10-CM

## 2020-06-23 DIAGNOSIS — R06.2 WHEEZING: ICD-10-CM

## 2020-06-23 DIAGNOSIS — S32.592A CLOSED FRACTURE OF LEFT INFERIOR PUBIC RAMUS, INITIAL ENCOUNTER: ICD-10-CM

## 2020-06-23 PROBLEM — N18.30 CKD (CHRONIC KIDNEY DISEASE) STAGE 3, GFR 30-59 ML/MIN: Status: ACTIVE | Noted: 2020-06-23

## 2020-06-23 PROBLEM — H40.9 GLAUCOMA, RIGHT EYE: Status: ACTIVE | Noted: 2020-06-23

## 2020-06-23 LAB
ALBUMIN SERPL BCP-MCNC: 4.1 G/DL (ref 3.5–5.2)
ALP SERPL-CCNC: 272 U/L (ref 38–126)
ALT SERPL W/O P-5'-P-CCNC: 44 U/L (ref 10–44)
ANION GAP SERPL CALC-SCNC: 9 MMOL/L (ref 8–16)
AST SERPL-CCNC: 51 U/L (ref 15–46)
BASOPHILS # BLD AUTO: 0.05 K/UL (ref 0–0.2)
BASOPHILS NFR BLD: 0.7 % (ref 0–1.9)
BILIRUB SERPL-MCNC: 0.7 MG/DL (ref 0.1–1)
BUN SERPL-MCNC: 22 MG/DL (ref 7–17)
CALCIUM SERPL-MCNC: 9.5 MG/DL (ref 8.7–10.5)
CHLORIDE SERPL-SCNC: 105 MMOL/L (ref 95–110)
CO2 SERPL-SCNC: 25 MMOL/L (ref 23–29)
CREAT SERPL-MCNC: 1.03 MG/DL (ref 0.5–1.4)
DIFFERENTIAL METHOD: ABNORMAL
EOSINOPHIL # BLD AUTO: 0.1 K/UL (ref 0–0.5)
EOSINOPHIL NFR BLD: 1.8 % (ref 0–8)
ERYTHROCYTE [DISTWIDTH] IN BLOOD BY AUTOMATED COUNT: 14.2 % (ref 11.5–14.5)
EST. GFR  (AFRICAN AMERICAN): >60 ML/MIN/1.73 M^2
EST. GFR  (NON AFRICAN AMERICAN): 52.2 ML/MIN/1.73 M^2
GLUCOSE SERPL-MCNC: 113 MG/DL (ref 70–110)
HCT VFR BLD AUTO: 41.3 % (ref 37–48.5)
HGB BLD-MCNC: 13.6 G/DL (ref 12–16)
IMM GRANULOCYTES # BLD AUTO: 0.06 K/UL (ref 0–0.04)
IMM GRANULOCYTES NFR BLD AUTO: 0.9 % (ref 0–0.5)
LYMPHOCYTES # BLD AUTO: 1.4 K/UL (ref 1–4.8)
LYMPHOCYTES NFR BLD: 19.8 % (ref 18–48)
MAGNESIUM SERPL-MCNC: 2.3 MG/DL (ref 1.6–2.6)
MCH RBC QN AUTO: 29.7 PG (ref 27–31)
MCHC RBC AUTO-ENTMCNC: 32.9 G/DL (ref 32–36)
MCV RBC AUTO: 90 FL (ref 82–98)
MONOCYTES # BLD AUTO: 0.6 K/UL (ref 0.3–1)
MONOCYTES NFR BLD: 8.6 % (ref 4–15)
NEUTROPHILS # BLD AUTO: 4.7 K/UL (ref 1.8–7.7)
NEUTROPHILS NFR BLD: 68.2 % (ref 38–73)
NRBC BLD-RTO: 0 /100 WBC
PHOSPHATE SERPL-MCNC: 3.9 MG/DL (ref 2.7–4.5)
PLATELET # BLD AUTO: 319 K/UL (ref 150–350)
PMV BLD AUTO: 8.8 FL (ref 9.2–12.9)
POTASSIUM SERPL-SCNC: 3.7 MMOL/L (ref 3.5–5.1)
PROT SERPL-MCNC: 7.3 G/DL (ref 6–8.4)
RBC # BLD AUTO: 4.58 M/UL (ref 4–5.4)
SARS-COV-2 RDRP RESP QL NAA+PROBE: NEGATIVE
SODIUM SERPL-SCNC: 139 MMOL/L (ref 136–145)
TSH SERPL DL<=0.005 MIU/L-ACNC: 10.7 UIU/ML (ref 0.4–4)
WBC # BLD AUTO: 6.83 K/UL (ref 3.9–12.7)

## 2020-06-23 PROCEDURE — 83036 HEMOGLOBIN GLYCOSYLATED A1C: CPT

## 2020-06-23 PROCEDURE — G0378 HOSPITAL OBSERVATION PER HR: HCPCS

## 2020-06-23 PROCEDURE — 84443 ASSAY THYROID STIM HORMONE: CPT | Mod: ER

## 2020-06-23 PROCEDURE — 85025 COMPLETE CBC W/AUTO DIFF WBC: CPT | Mod: ER

## 2020-06-23 PROCEDURE — 99285 EMERGENCY DEPT VISIT HI MDM: CPT | Mod: 25,ER

## 2020-06-23 PROCEDURE — 80053 COMPREHEN METABOLIC PANEL: CPT | Mod: ER

## 2020-06-23 PROCEDURE — U0002 COVID-19 LAB TEST NON-CDC: HCPCS | Mod: ER

## 2020-06-23 PROCEDURE — 84439 ASSAY OF FREE THYROXINE: CPT

## 2020-06-23 PROCEDURE — 84100 ASSAY OF PHOSPHORUS: CPT | Mod: ER

## 2020-06-23 PROCEDURE — 96372 THER/PROPH/DIAG INJ SC/IM: CPT | Mod: ER

## 2020-06-23 PROCEDURE — 83735 ASSAY OF MAGNESIUM: CPT | Mod: ER

## 2020-06-23 PROCEDURE — 63600175 PHARM REV CODE 636 W HCPCS: Mod: ER | Performed by: EMERGENCY MEDICINE

## 2020-06-23 RX ORDER — FUROSEMIDE 20 MG/1
20 TABLET ORAL DAILY
Status: DISCONTINUED | OUTPATIENT
Start: 2020-06-24 | End: 2020-06-25 | Stop reason: HOSPADM

## 2020-06-23 RX ORDER — AMLODIPINE BESYLATE 5 MG/1
10 TABLET ORAL DAILY
Status: DISCONTINUED | OUTPATIENT
Start: 2020-06-24 | End: 2020-06-25 | Stop reason: HOSPADM

## 2020-06-23 RX ORDER — CALCIUM CARBONATE 200(500)MG
500 TABLET,CHEWABLE ORAL 2 TIMES DAILY
Status: DISCONTINUED | OUTPATIENT
Start: 2020-06-24 | End: 2020-06-25 | Stop reason: HOSPADM

## 2020-06-23 RX ORDER — ROSUVASTATIN CALCIUM 10 MG/1
10 TABLET, COATED ORAL NIGHTLY
Status: DISCONTINUED | OUTPATIENT
Start: 2020-06-24 | End: 2020-06-25 | Stop reason: HOSPADM

## 2020-06-23 RX ORDER — GLUCAGON 1 MG
1 KIT INJECTION
Status: DISCONTINUED | OUTPATIENT
Start: 2020-06-24 | End: 2020-06-25 | Stop reason: HOSPADM

## 2020-06-23 RX ORDER — HYDROCODONE BITARTRATE AND ACETAMINOPHEN 5; 325 MG/1; MG/1
1 TABLET ORAL EVERY 4 HOURS PRN
Qty: 18 TABLET | Refills: 0 | Status: SHIPPED | OUTPATIENT
Start: 2020-06-23 | End: 2020-06-25 | Stop reason: HOSPADM

## 2020-06-23 RX ORDER — DEXTROSE 50 % IN WATER (D50W) INTRAVENOUS SYRINGE
12.5
Status: DISCONTINUED | OUTPATIENT
Start: 2020-06-24 | End: 2020-06-25 | Stop reason: HOSPADM

## 2020-06-23 RX ORDER — OXYCODONE AND ACETAMINOPHEN 5; 325 MG/1; MG/1
1 TABLET ORAL EVERY 6 HOURS PRN
Status: DISCONTINUED | OUTPATIENT
Start: 2020-06-24 | End: 2020-06-25 | Stop reason: HOSPADM

## 2020-06-23 RX ORDER — SODIUM CHLORIDE 0.9 % (FLUSH) 0.9 %
10 SYRINGE (ML) INJECTION
Status: DISCONTINUED | OUTPATIENT
Start: 2020-06-24 | End: 2020-06-25 | Stop reason: HOSPADM

## 2020-06-23 RX ORDER — IBUPROFEN 200 MG
24 TABLET ORAL
Status: DISCONTINUED | OUTPATIENT
Start: 2020-06-24 | End: 2020-06-25 | Stop reason: HOSPADM

## 2020-06-23 RX ORDER — SODIUM CHLORIDE, SODIUM LACTATE, POTASSIUM CHLORIDE, CALCIUM CHLORIDE 600; 310; 30; 20 MG/100ML; MG/100ML; MG/100ML; MG/100ML
INJECTION, SOLUTION INTRAVENOUS ONCE
Status: COMPLETED | OUTPATIENT
Start: 2020-06-24 | End: 2020-06-23

## 2020-06-23 RX ORDER — CHOLECALCIFEROL (VITAMIN D3) 10 MCG
400 TABLET ORAL DAILY
Status: DISCONTINUED | OUTPATIENT
Start: 2020-06-24 | End: 2020-06-25 | Stop reason: HOSPADM

## 2020-06-23 RX ORDER — CILOSTAZOL 50 MG/1
100 TABLET ORAL 2 TIMES DAILY
Status: DISCONTINUED | OUTPATIENT
Start: 2020-06-24 | End: 2020-06-25 | Stop reason: HOSPADM

## 2020-06-23 RX ORDER — MORPHINE SULFATE 2 MG/ML
2 INJECTION, SOLUTION INTRAMUSCULAR; INTRAVENOUS EVERY 6 HOURS PRN
Status: DISCONTINUED | OUTPATIENT
Start: 2020-06-24 | End: 2020-06-25

## 2020-06-23 RX ORDER — IBUPROFEN 200 MG
1 TABLET ORAL DAILY
Status: DISCONTINUED | OUTPATIENT
Start: 2020-06-24 | End: 2020-06-25 | Stop reason: HOSPADM

## 2020-06-23 RX ORDER — IPRATROPIUM BROMIDE AND ALBUTEROL SULFATE 2.5; .5 MG/3ML; MG/3ML
3 SOLUTION RESPIRATORY (INHALATION) EVERY 6 HOURS PRN
Status: DISCONTINUED | OUTPATIENT
Start: 2020-06-24 | End: 2020-06-25 | Stop reason: HOSPADM

## 2020-06-23 RX ORDER — IBUPROFEN 200 MG
16 TABLET ORAL
Status: DISCONTINUED | OUTPATIENT
Start: 2020-06-24 | End: 2020-06-25 | Stop reason: HOSPADM

## 2020-06-23 RX ORDER — HEPARIN SODIUM 5000 [USP'U]/ML
5000 INJECTION, SOLUTION INTRAVENOUS; SUBCUTANEOUS EVERY 12 HOURS
Status: DISCONTINUED | OUTPATIENT
Start: 2020-06-24 | End: 2020-06-23

## 2020-06-23 RX ORDER — ASPIRIN 81 MG/1
81 TABLET ORAL DAILY
Status: DISCONTINUED | OUTPATIENT
Start: 2020-06-24 | End: 2020-06-25 | Stop reason: HOSPADM

## 2020-06-23 RX ORDER — METOPROLOL TARTRATE 50 MG/1
50 TABLET ORAL 2 TIMES DAILY
Status: DISCONTINUED | OUTPATIENT
Start: 2020-06-24 | End: 2020-06-25 | Stop reason: HOSPADM

## 2020-06-23 RX ORDER — AMIODARONE HYDROCHLORIDE 200 MG/1
200 TABLET ORAL DAILY
Status: DISCONTINUED | OUTPATIENT
Start: 2020-06-24 | End: 2020-06-25 | Stop reason: HOSPADM

## 2020-06-23 RX ORDER — MORPHINE SULFATE 4 MG/ML
8 INJECTION, SOLUTION INTRAMUSCULAR; INTRAVENOUS
Status: COMPLETED | OUTPATIENT
Start: 2020-06-23 | End: 2020-06-23

## 2020-06-23 RX ORDER — DEXTROSE 50 % IN WATER (D50W) INTRAVENOUS SYRINGE
25
Status: DISCONTINUED | OUTPATIENT
Start: 2020-06-24 | End: 2020-06-25 | Stop reason: HOSPADM

## 2020-06-23 RX ORDER — FERROUS SULFATE, DRIED 160(50) MG
1 TABLET, EXTENDED RELEASE ORAL 2 TIMES DAILY WITH MEALS
Status: DISCONTINUED | OUTPATIENT
Start: 2020-06-24 | End: 2020-06-23

## 2020-06-23 RX ORDER — INSULIN ASPART 100 [IU]/ML
1-10 INJECTION, SOLUTION INTRAVENOUS; SUBCUTANEOUS
Status: DISCONTINUED | OUTPATIENT
Start: 2020-06-24 | End: 2020-06-25 | Stop reason: HOSPADM

## 2020-06-23 RX ADMIN — MORPHINE SULFATE 8 MG: 4 INJECTION INTRAVENOUS at 01:06

## 2020-06-23 RX ADMIN — SODIUM CHLORIDE, SODIUM LACTATE, POTASSIUM CHLORIDE, AND CALCIUM CHLORIDE: .6; .31; .03; .02 INJECTION, SOLUTION INTRAVENOUS at 11:06

## 2020-06-23 RX ADMIN — OXYCODONE HYDROCHLORIDE AND ACETAMINOPHEN 1 TABLET: 5; 325 TABLET ORAL at 11:06

## 2020-06-23 NOTE — ED NOTES
Patient assisted to use the bedpan. Patient could not turn from sided to side in order to get on the bedpan. Patient could not lift her hips, pads placed underneath the patient in order for the patient to urinate. MD made aware of the obstacle.

## 2020-06-23 NOTE — ED PROVIDER NOTES
"Encounter Date: 6/23/2020       History     Chief Complaint   Patient presents with    left hip pain     "I fell last Wednesday on my hip and it is still hurting and I am not able to walk on it" pulses good     HPI   This is a 79 y.o. female who has a past medical history of Arthritis, Basal cell carcinoma, and Hypertension.     The patient presents to the Emergency Department with left back/hip pain x6 days s/p fall.  Pt slipped and fell, no head trauma, no LOC, no other injury.  Was seen in [facility] ED at that time and discharged home.  Pt states that pain has continued and she cannot walk due to pain and weakness.  She has a walker but is afraid to use it.   No other associated symptoms.  Pt denies numbness, difficulty urinating.   Symptoms are aggravated by turning and walking.  Symptoms are relieved by rest.   Patient has no prior history of back problems.       Review of patient's allergies indicates:   Allergen Reactions    Penicillins Swelling     Past Medical History:   Diagnosis Date    Arthritis     Basal cell carcinoma     Hypertension      Past Surgical History:   Procedure Laterality Date    HYSTERECTOMY      mohs repair       Family History   Problem Relation Age of Onset    Cancer Father     Amblyopia Neg Hx     Blindness Neg Hx     Cataracts Neg Hx     Diabetes Neg Hx     Glaucoma Neg Hx     Hypertension Neg Hx     Macular degeneration Neg Hx     Retinal detachment Neg Hx     Strabismus Neg Hx     Stroke Neg Hx     Thyroid disease Neg Hx      Social History     Tobacco Use    Smoking status: Current Every Day Smoker   Substance Use Topics    Alcohol use: No    Drug use: Not on file     Review of Systems   Constitutional: Positive for activity change. Negative for fever.   Genitourinary: Negative for difficulty urinating.   Musculoskeletal: Positive for back pain.   Skin: Negative for wound.   Neurological: Positive for weakness. Negative for numbness.   Psychiatric/Behavioral: " Negative for sleep disturbance.       Physical Exam     Initial Vitals [06/23/20 1256]   BP Pulse Resp Temp SpO2   129/70 71 (!) 21 98.8 °F (37.1 °C) (!) 94 %      MAP       --         Physical Exam    Nursing note and vitals reviewed.  Constitutional: She appears well-developed and well-nourished. She is not diaphoretic. No distress.   HENT:   Head: Normocephalic and atraumatic.   Mouth/Throat: Oropharynx is clear and moist.   Eyes: Conjunctivae are normal.   Cardiovascular: Normal rate, regular rhythm and intact distal pulses.   Pulmonary/Chest: No respiratory distress.   Musculoskeletal: Normal range of motion. Tenderness (Left SI joint and gluteal area) present.      Comments: Left hip:  Normal range of motion to the left hip.  No tenderness to the left hip.  No tenderness to the anterior pelvis.     Back: TTP at left SI joint and at L5-S1   Neurological: She is alert and oriented to person, place, and time.   Skin: Skin is warm and dry. Capillary refill takes less than 2 seconds. No rash noted. No erythema.   Psychiatric: She has a normal mood and affect.         ED Course   Procedures  Labs Reviewed - No data to display       Imaging Results          CT Pelvis Without Contrast (Final result)  Result time 06/23/20 14:43:41    Final result by SALOME Young Sr., MD (06/23/20 14:43:41)                 Impression:      1. There is an acute appearing vertical fracture on the left side of the sacrum.  There is an acute appearing fracture involving the anterior column of the left acetabulum. There is an acute appearing fracture through the midportion of the left inferior pubic ramus.  This is consistent with the patient's history.  2. There is a small amount of hyperdense fluid posterior to the rectum.  This is characteristic of hemorrhage.  3. There is a marked amount of atherosclerosis.  All CT scans at this facility use dose modulation, iterative reconstruction, and/or weight base dosing when appropriate to  reduce radiation dose when appropriate to reduce radiation dose to as low as reasonably achievable.      Electronically signed by: Kenroy Young MD  Date:    06/23/2020  Time:    14:43             Narrative:    EXAMINATION:  CT PELVIS WITHOUT CONTRAST    CLINICAL HISTORY:  Pelvic trauma, nondiagnostic xray; sacral fracture    TECHNIQUE:  Standard pelvis CT protocol without IV contrast was performed.  Coronal and sagittal reformats were obtained.    COMPARISON:  A CT examination of the lumbar spine performed on 06/23/2020.    FINDINGS:  Finding: There is an acute appearing vertical fracture on the left side of the sacrum.  There is an acute appearing fracture involving the anterior column of the left acetabulum.  There is an acute appearing fracture through the midportion of the left inferior pubic ramus.  There is no dislocation.    The appendix and the rest of the visualized portion of the gastrointestinal system is normal in appearance.  The visualized portion of the ureters is normal in appearance.  The urinary bladder is normal in appearance.  There is a small amount of hyperdense fluid posterior to the rectum.  There is no pneumoperitoneum.  There is a marked amount of atherosclerosis.                               CT Lumbar Spine Without Contrast (Final result)  Result time 06/23/20 14:35:42    Final result by SALOME Young Sr., MD (06/23/20 14:35:42)                 Impression:      1. There is an acute appearing fracture in the superior endplate of L3. There is an acute appearing vertical fracture on the left side of the sacrum.  This is consistent with the patient's history.  2. There is grade 1 anterolisthesis of L3 on L4. There is grade 1 anterolisthesis of L4 on L5.  3. There is a minimal amount of levoconvex curvature of the thoracolumbar spine.  4. The common bile duct is dilated.  It has a diameter of 13 mm.  If additional imaging evaluation is clinically indicated, I recommend consideration of  an ERCP or MRCP.  5. There is a small diverticulum off of the posterior aspect of the 2nd portion of the duodenum.  6. There are calcifications scattered throughout the liver and spleen.  This is characteristic of a prior granulomatous infection.  7. There is a marked amount of atherosclerosis.  All CT scans at this facility use dose modulation, iterative reconstruction, and/or weight base dosing when appropriate to reduce radiation dose when appropriate to reduce radiation dose to as low as reasonably achievable.      Electronically signed by: Kenroy Young MD  Date:    06/23/2020  Time:    14:35             Narrative:    EXAMINATION:  CT LUMBAR SPINE WITHOUT CONTRAST    CLINICAL HISTORY:  L/S-spine fracture, traumatic;    TECHNIQUE:  Standard lumbar spine CT protocol was performed without IV contrast.    COMPARISON:  None    FINDINGS:  There are 5 lumbar-type vertebral bodies.  There is an acute appearing fracture in the superior endplate of L3.  There is a minimal amount of levoconvex curvature of the thoracolumbar spine.  There is grade 1 anterolisthesis of L3 on L4.  There is grade 1 anterolisthesis of L4 on L5.  There is normal lumbar lordosis.  There is an acute appearing vertical fracture on the left side of the sacrum.  There is a marked amount of atherosclerosis.  There are calcifications scattered throughout the liver and spleen.  The common bile duct is dilated.  It has a diameter of 13 mm.  There is a small diverticulum off of the posterior aspect of the 2nd portion of the duodenum.                              X-Rays:   Independently Interpreted Readings:   Other Readings:  CT lumbar spine: left lateral vertical sacral fracture  CT pelvis: same left lateral vertical sacral fracture, additional finding of left anterior acetabular fracture, nondisplaced, left inferior ramus fracture.    Medical Decision Making:   Initial Assessment:   This is an emergent evaluation of a 79 y.o.female patient with  presentation of left back and hip pain s/p fall.  Tenderness mostly at left SI joint and L5-S1..     Initial differentials include but are not limited to:  SI joint tear, fracture, subluxation, doubt cord compression or cauda equina     Plan: CT lumbar spine and pelvis    Clinical Tests:   Radiological Study: Reviewed and Ordered              Attending Attestation:             Attending ED Notes:   Patient has multiple fractures of the pelvis, with the addition of the L3 superior endplate.  We attempted to ambulate the patient with assistance and a walker, but patient was only able to bear weight standing and not able to move at all.  I do not feel this is a safe discharge and patient should be admitted to the hospital for pain control, PT eval and likely IP rehab placement.                        Clinical Impression:       ICD-10-CM ICD-9-CM   1. Closed fracture of sacrum and coccyx, initial encounter  S32.10XA 805.6    S32.2XXA    2. Closed fracture of left inferior pubic ramus, initial encounter  S32.592A 808.2   3. Closed nondisplaced fracture of anterior wall of left acetabulum, initial encounter  S32.415A 808.0   4. Closed fracture of third lumbar vertebra, unspecified fracture morphology, initial encounter  S32.039A 805.4                                Wood Constantino MD  06/23/20 2154

## 2020-06-23 NOTE — ED NOTES
"Pt states "ever since I fell in my kitchen last Wednesday 6 days ago, I can't walk or even try to stand up. The pain is so bad in my left side by my hip. I have been in the bed. I use a bedpan and my  has to help me". Pt states prior to fall, she ambulated fine with no assistance," no, before I this I walked fine like you". No deformity noted left leg, pulses good.    "

## 2020-06-23 NOTE — ED NOTES
MD is at the patients bedside. Walker placed at the bedside to see if the patient is able to ambulate. Patient not successful. Patient placed back in bed and placed in position of comfort. Patient informed that she will be admitted to the hospital.

## 2020-06-23 NOTE — DISCHARGE INSTRUCTIONS
Pelvic Fracture (English) View Edit Remove   Back Fracture (Compression Fracture) (English) View Edit Remove   Acetaminophen; Oxycodone tablets (English) View Edit Remove   Falls in the Home, Preventing (English) View Edit Remove

## 2020-06-24 ENCOUNTER — CLINICAL SUPPORT (OUTPATIENT)
Dept: SMOKING CESSATION | Facility: CLINIC | Age: 80
End: 2020-06-24

## 2020-06-24 DIAGNOSIS — F17.210 CIGARETTE SMOKER: Primary | ICD-10-CM

## 2020-06-24 PROBLEM — T14.8XXA ACUTE FRACTURE: Status: ACTIVE | Noted: 2020-06-24

## 2020-06-24 LAB
ESTIMATED AVG GLUCOSE: 128 MG/DL (ref 68–131)
HBA1C MFR BLD HPLC: 6.1 % (ref 4–5.6)
MAGNESIUM SERPL-MCNC: 2.2 MG/DL (ref 1.6–2.6)
PHOSPHATE SERPL-MCNC: 3.8 MG/DL (ref 2.7–4.5)
POCT GLUCOSE: 103 MG/DL (ref 70–110)
POCT GLUCOSE: 111 MG/DL (ref 70–110)
POCT GLUCOSE: 130 MG/DL (ref 70–110)
POCT GLUCOSE: 138 MG/DL (ref 70–110)
T4 FREE SERPL-MCNC: 0.97 NG/DL (ref 0.71–1.51)

## 2020-06-24 PROCEDURE — 99406 BEHAV CHNG SMOKING 3-10 MIN: CPT | Mod: S$GLB,,,

## 2020-06-24 PROCEDURE — 63600175 PHARM REV CODE 636 W HCPCS: Performed by: STUDENT IN AN ORGANIZED HEALTH CARE EDUCATION/TRAINING PROGRAM

## 2020-06-24 PROCEDURE — 94761 N-INVAS EAR/PLS OXIMETRY MLT: CPT

## 2020-06-24 PROCEDURE — 86580 TB INTRADERMAL TEST: CPT | Performed by: STUDENT IN AN ORGANIZED HEALTH CARE EDUCATION/TRAINING PROGRAM

## 2020-06-24 PROCEDURE — 97530 THERAPEUTIC ACTIVITIES: CPT

## 2020-06-24 PROCEDURE — 93005 ELECTROCARDIOGRAM TRACING: CPT

## 2020-06-24 PROCEDURE — 97161 PT EVAL LOW COMPLEX 20 MIN: CPT

## 2020-06-24 PROCEDURE — 99203 PR OFFICE/OUTPT VISIT, NEW, LEVL III, 30-44 MIN: ICD-10-PCS | Mod: ,,, | Performed by: ORTHOPAEDIC SURGERY

## 2020-06-24 PROCEDURE — 25000003 PHARM REV CODE 250: Performed by: STUDENT IN AN ORGANIZED HEALTH CARE EDUCATION/TRAINING PROGRAM

## 2020-06-24 PROCEDURE — 99406 PT REFUSED TOBACCO CESSATION: ICD-10-PCS | Mod: S$GLB,,,

## 2020-06-24 PROCEDURE — G0378 HOSPITAL OBSERVATION PER HR: HCPCS

## 2020-06-24 PROCEDURE — 99900035 HC TECH TIME PER 15 MIN (STAT)

## 2020-06-24 PROCEDURE — 84100 ASSAY OF PHOSPHORUS: CPT

## 2020-06-24 PROCEDURE — 36415 COLL VENOUS BLD VENIPUNCTURE: CPT

## 2020-06-24 PROCEDURE — 30200315 PPD INTRADERMAL TEST REV CODE 302: Performed by: STUDENT IN AN ORGANIZED HEALTH CARE EDUCATION/TRAINING PROGRAM

## 2020-06-24 PROCEDURE — 97165 OT EVAL LOW COMPLEX 30 MIN: CPT

## 2020-06-24 PROCEDURE — 25000003 PHARM REV CODE 250: Performed by: INTERNAL MEDICINE

## 2020-06-24 PROCEDURE — 99203 OFFICE O/P NEW LOW 30 MIN: CPT | Mod: ,,, | Performed by: ORTHOPAEDIC SURGERY

## 2020-06-24 PROCEDURE — 83735 ASSAY OF MAGNESIUM: CPT

## 2020-06-24 PROCEDURE — 27000221 HC OXYGEN, UP TO 24 HOURS

## 2020-06-24 RX ADMIN — ASPIRIN 81 MG: 81 TABLET, COATED ORAL at 08:06

## 2020-06-24 RX ADMIN — OXYCODONE HYDROCHLORIDE AND ACETAMINOPHEN 1 TABLET: 5; 325 TABLET ORAL at 07:06

## 2020-06-24 RX ADMIN — FUROSEMIDE 20 MG: 20 TABLET ORAL at 08:06

## 2020-06-24 RX ADMIN — CILOSTAZOL 100 MG: 50 TABLET ORAL at 08:06

## 2020-06-24 RX ADMIN — AMIODARONE HYDROCHLORIDE 200 MG: 200 TABLET ORAL at 08:06

## 2020-06-24 RX ADMIN — AMLODIPINE BESYLATE 10 MG: 5 TABLET ORAL at 08:06

## 2020-06-24 RX ADMIN — METOPROLOL TARTRATE 50 MG: 50 TABLET, FILM COATED ORAL at 08:06

## 2020-06-24 RX ADMIN — Medication 1 CAPSULE: at 08:06

## 2020-06-24 RX ADMIN — TUBERCULIN PURIFIED PROTEIN DERIVATIVE 5 UNITS: 5 INJECTION, SOLUTION INTRADERMAL at 09:06

## 2020-06-24 RX ADMIN — CHOLECALCIFEROL TAB 10 MCG (400 UNIT) 400 UNITS: 10 TAB at 08:06

## 2020-06-24 NOTE — H&P
"[facility] History and Physical - Resident Note      Admitting Team: [facility] Internal Medicine Team A   Attending Physician: Aric   Resident: Rivka   Interns: Amna     Date of Admit: 6/23/2020      Chief Complaint and Duration      Left hip pain x 1 week     Subjective:       History of Present Illness:   Yanni Car is a 79 y.o. female who has a past medical history of Arthritis, Basal cell carcinoma, and Hypertension. The patient has had left back/hip pain x6 days s/p fall. Pt slipped and fell because her tennis shoes got caught on the floor., no head trauma, no LOC, no other injury. Was seen in [facility] ED at that time and discharged home. Pt states that pain has continued and she cannot walk due to pain and weakness. She has a walker but does not use it.  No other associated symptoms. Pt denies numbness, difficulty urinating.  Symptoms are aggravated by turning and walking. Symptoms are relieved by rest.  Patient has no prior history of back problems. Significant smoking history. She lives with her  and was previously independent.      Past Medical History:   As above     Past Surgical History:   Hysterectomy      Allergies:   PCN: swelling 40+ years ago     Home Medications:   Amiodarone 200 mg po daily   Amlodipine 10 mg daily   ASA 81 mg daily   Calcium-vitamin D   Cilostazol 100 mg BID   Fish oil 2 g po daily   Lasix 20 mg daily   Lopressor 50 mg BID   Rosuvastatin 10 mg PO every evening      Family History:   Father heart disease in 70's 80s     Social History:   Tobacco:40pack year  EtOH:n/a  Illicits:n/a      Review of Systems:   Pertinent items listed in HPI. All other systems are reviewed and are negative.     Health Maintaince :    Primary Care Physician: Oneal      Objective:   Body mass index is 29.75 kg/m².      Physical Examination:   Triage: BP: 129/70  Pulse: 71  Temp: 98.8 °F (37.1 °C)  Resp: (!) 21  Height: 5' 6" (167.6 cm)  Weight: 83.9 kg (185 lb)  BMI (Calculated): 29.9  SpO2: (!) 94 " "%   Exam: BP (!) 163/70 (BP Location: Right arm, Patient Position: Lying)   Pulse 79   Temp 97 °F (36.1 °C) (Axillary)   Resp 20   Ht 5' 6" (1.676 m)   Wt 83.6 kg (184 lb 4.9 oz)   SpO2 (!) 94%   BMI 29.75 kg/m²       Gen: Alert to person, place and time. Well appearing, non toxic, NAD.    Head: Atraumatic, normocephalic   Neck: No JVD present, neck supple, no LAD   Eyes: Pupils equal and reactive to light,extra-ocular eye movements intact,   Mouth: no erythema, exudates, or lesions present in oropharynx   CV: RRR, no rubs, gallops, or murmurs.      Lungs: CTAB no crackles, wheezing, or rhonchi.  No fremitus noted.     Abd: Soft, non tender, non distended, bowel sounds present. No rebound tenderness or guarding present.     EXT: 2+ radial and dorsalis pedis pulses bilaterally.  No edema or cyanosis noted.    Neuro: CN II: pupils equal, reactive to light. CN III, IV, VI: extra-ocular muscles intact. CN V: sensation to light touch intact throughout face bilaterally. CN VII: upper and lower facial muscles intact. CN XI: sternocleidomastoid intact movement against resistance. CN XII: Tongue movements intact.  No focal neuro deficits noted.  UE/LE muscles move against resistance with no problem. Sensation intact in LE bilaterally. Vertebral spine tenderness lower lumbar region  Skin: Warm and dry.  No jaundice noted.        Laboratory:   Recent Labs   Lab 06/23/20  1931   WBC 6.83   HGB 13.6   HCT 41.3         K 3.7      CREATININE 1.03   BUN 22*   CO2 25   ALT 44   AST 51*           Other Results:      Radiology:   CT Pelvis Without Contrast   Final Result      1. There is an acute appearing vertical fracture on the left side of the sacrum.  There is an acute appearing fracture involving the anterior column of the left acetabulum. There is an acute appearing fracture through the midportion of the left inferior pubic ramus.  This is consistent with the patient's history.   2. There is a small " amount of hyperdense fluid posterior to the rectum.  This is characteristic of hemorrhage.   3. There is a marked amount of atherosclerosis.   All CT scans at this facility use dose modulation, iterative reconstruction, and/or weight base dosing when appropriate to reduce radiation dose when appropriate to reduce radiation dose to as low as reasonably achievable.         Electronically signed by: Kenroy Young MD   Date:    06/23/2020   Time:    14:43      CT Lumbar Spine Without Contrast   Final Result      1. There is an acute appearing fracture in the superior endplate of L3. There is an acute appearing vertical fracture on the left side of the sacrum.  This is consistent with the patient's history.   2. There is grade 1 anterolisthesis of L3 on L4. There is grade 1 anterolisthesis of L4 on L5.   3. There is a minimal amount of levoconvex curvature of the thoracolumbar spine.   4. The common bile duct is dilated.  It has a diameter of 13 mm.  If additional imaging evaluation is clinically indicated, I recommend consideration of an ERCP or MRCP.   5. There is a small diverticulum off of the posterior aspect of the 2nd portion of the duodenum.   6. There are calcifications scattered throughout the liver and spleen.  This is characteristic of a prior granulomatous infection.   7. There is a marked amount of atherosclerosis.   All CT scans at this facility use dose modulation, iterative reconstruction, and/or weight base dosing when appropriate to reduce radiation dose when appropriate to reduce radiation dose to as low as reasonably achievable.         Electronically signed by: Kenroy Young MD   Date:    06/23/2020   Time:    14:35           I have reviewed the above reports as well as previous records.       Assessment:      Yanni Car is a 79 y.o. female with pmhx of HTN, arthritis, basal cell carcinoma who is admitted       Plan:      Acute left side sacral fracture and Acute fracture anterior Left  acetabulum s/p mechanical fall   -noted on CT pelvis done in ED  -VSS, xrays done a week ago negative for fracture   -consult placed to Orthopedic surgery      Acute fracture of L3 s/p mechanical fall   -noted on CT lumbar spine  -VSS, xrays done a week ago negative for fracture   -consult ortho as above    Wheezing likely related to prolonged smoking history  -Duonebs q6  -F/u with PCP for possible COPD  -Supp oxygen as necessary    Common Bile Duct dilation   -incidentally found on CT lumber spine   -RUQ US. Alk phos also elevated    HTN  -/65  -resume home medications: Amlodipine 10 mg daily, lopressor 50 mg BID     Diet:Cardiac/NPO 12  DVT ppx:SCD's  Dispo:pain control tonight. Ortho consult in AM. PT/OT eval in AM as well      Richard Woo MD  [facility] Internal Medicine HO-I  [facility] Internal Medicine Team A    [facility] Medicine Hospitalist Pager numbers:   [facility] Hospitalist Medicine Team A (Dom/Aric):          [phone]  [facility] Hospitalist Medicine Team B (Fredo/Yeyo):        [phone]

## 2020-06-24 NOTE — NURSING
Per Dr. Torres (ortho) patient does not need to be NPO; MD team paged to get orders placed for diet.

## 2020-06-24 NOTE — PLAN OF CARE
met with patient and discussed possible SNF placement. Patient was provided with SNF list. Patient's choices were : 1) [facility], 2 ) [facility], 3) [facility], 4) [facility] . SW will continue to follow up pending approval.        06/24/20 5761   Post-Acute Status   Post-Acute Authorization Placement   Post-Acute Placement Status Referrals Sent

## 2020-06-24 NOTE — PLAN OF CARE
TN and  met with patient to discuss SNF at D/C.  Patient informed she was medically accepted to second preference [facility]. Patient stated she would prefer [facility] because it is closer.     informed patient [facility] may not be accepting new patients still because of COVID 19.    TN and  to follow up with patient in the am.  If [facility] not cant accept, patient is in agreement with proceeding with placement at [facility].

## 2020-06-24 NOTE — PT/OT/SLP EVAL
"Physical Therapy Evaluation and Treatment    Patient Name:  Yanni Car   MRN:  [id number]    Recommendations:     Discharge Recommendations:  rehabilitation facility   Discharge Equipment Recommendations: bedside commode, bath bench   Barriers to discharge: Inaccessible home and requires increased assist for all mobility    Assessment:     Yanni Car is a 79 y.o. female admitted with a medical diagnosis of Acute fracture.  She presents with the following impairments/functional limitations:  weakness, impaired endurance, impaired self care skills, impaired functional mobilty, gait instability, impaired balance, decreased lower extremity function, pain, decreased ROM, orthopedic precautions. Pt with LLE PWB status and completed 2 stands with max A for 1st stand and mod A for 2nd stand, took 1 side slide step right with mod/max A x 2.   Despite patient's co-morbidities, patient was living in community setting functioning at independent level for ADLs, and mobility prior to this event.  There is an expectation of returning to prior level of function to maintain independence thus avoiding readmission.  Patient's clinical condition meets full Inpatient Rehab (IPR) criteria; including the ability to actively participate in 3 hours of therapy.      Rehab Prognosis: Good; patient would benefit from acute skilled PT services to address these deficits and reach maximum level of function.    Recent Surgery: * No surgery found *      Plan:     During this hospitalization, patient to be seen daily to address the identified rehab impairments via gait training, therapeutic activities, therapeutic exercises and progress toward the following goals:    · Plan of Care Expires:  07/24/20    Subjective     Chief Complaint: feeling "a little out of it"  Patient/Family Comments/goals: pt is agreeable to participate in therapy session, requires motivation to work on standing  Pain/Comfort:  · Pain Rating 1: 0/10  · Pain Rating " Post-Intervention 1: 0/10    Patients cultural, spiritual, Baptism conflicts given the current situation: no    Living Environment:  Pt lives with her  in a H with 4-5 JOSHUA with R railing and WIS.  Prior to admission, patients level of function was independent without AD until having a fall ~1 week ago. Since then she has been unable to get OOB, stand, ambulate, her  assists her with toileting on bed pan. Her  bought her a RW last week but she has been unable to use it.  Equipment used at home: walker, rolling.  DME owned (not currently used): rolling walker.  Upon discharge, patient will have assistance from her .    Objective:     Communicated with nurse Kayley prior to session.  Patient found HOB elevated with bed alarm, peripheral IV  upon PT entry to room.    General Precautions: Standard, fall   Orthopedic Precautions:LLE partial weight bearing   Braces: N/A     Exams:  · Cognitive Exam:  Patient is oriented to Person, Place, Time and Situation  · Gross Motor Coordination:  WFL  · Postural Exam:  Patient presented with the following abnormalities:    · -       Rounded shoulders  · -       Forward head  · Sensation:    · -       Intact  · Skin Integrity/Edema:      · -       Skin integrity: Visible skin intact  · RLE ROM: WFL  · RLE Strength: WFL  · LLE ROM: WFL  · LLE Strength: WFLs - L hip at least 4-/5 - further resistance not applied to avoid pain    Functional Mobility:  · Bed Mobility:     · Scooting: moderate assistance and maximal assistance  · Supine to Sit: minimum assistance and assist to raise trunk  · Sit to Supine: moderate assistance  · Transfers:     · Sit to Stand:  max A for 1st stand, mod A for 2nd stand with rolling walker and 2 person assist as one person present to assist with pt maintain LLE PWB status  · Gait: 1 side slide step to the right with RW and max A - limited ability to move feet and required assist to maintain LLE PWB status.    Therapeutic  Activities and Exercises:  Educated pt on PWB status.  Demo and cues for standing and use of RW.  Completed 2 stands at RW - OT positioned to pt's L and maintained L foot under pt's L foot to assist with LLE PWB status.  Only able to take 1 slide step as reported above then pt requesting to return to supine, unable to complete transfer to chair.  Educated on supine LE exercises: APs, heel slides, and QS/GS.    AM-PAC 6 CLICK MOBILITY  Total Score:10     Patient left HOB elevated with all lines intact, call button in reach, bed alarm on and nurse notified.    GOALS:   Multidisciplinary Problems     Physical Therapy Goals        Problem: Physical Therapy Goal    Goal Priority Disciplines Outcome Goal Variances Interventions   Physical Therapy Goal     PT, PT/OT Ongoing, Progressing     Description: Goals to be met by: 20     Patient will increase functional independence with mobility by performin. Supine <> sit with MInimal Assistance  2. Sit to stand transfer with Minimal Assistance  3. Bed to chair transfer with Minimal Assistance using Rolling Walker while maintaining LLE PWB  4. Gait  x 10 feet with Minimal Assistance using Rolling Walker while maintaining LLE PWB status  5. Lower extremity exercise program x 10 reps per handout, with supervision                     History:     Past Medical History:   Diagnosis Date    Arthritis     Basal cell carcinoma     Hypertension     PAF (paroxysmal atrial fibrillation) 2016    In NSR today-on amiodarone [id number] 4 with 4% risk of yearly CVA  Afib was related to SIRS in 2012 She was admitted for pneumonia   2019:   Normal EF  Normal diastolic parameters  No valvular disease             Past Surgical History:   Procedure Laterality Date    HYSTERECTOMY      mohs repair         Time Tracking:     PT Received On: 20  PT Start Time: 901     PT Stop Time: 937  PT Total Time (min): 36 min co-tx with OT    Billable Minutes: Evaluation 10 and  Therapeutic Activity 13      Estefania Forte, PT  06/24/2020

## 2020-06-24 NOTE — PLAN OF CARE
Pt AAOx3. Pt complained of pain to left lower extremities; pain upon movement/repositioning. Pain managed with prn medication. Cardiac monitor/SCDs applied. 1L NC applied, 88% on RA. Purewick placed. NPO except medications, pt verbalized understanding. Encouraged to call with questions/concerns/assistance. Will continue to monitor. Safety maintained.

## 2020-06-24 NOTE — PLAN OF CARE
met with patient and provided rehab list to look over. Patient chose 1) [facility] and 2) [facility] as her choices. SW submitted rehab referrals to both facilities. KELLEY will continue to follow up pending approval.       06/24/20 5644   Post-Acute Status   Post-Acute Authorization Placement   Post-Acute Placement Status Referrals Sent

## 2020-06-24 NOTE — PLAN OF CARE
left message for [place] with [facility] to inquire if they are accepting new patients.      contacted Vicky with [facility] and notified her of the referral sent and that the patient was in observation status.    Vicky stated she will review the referral and follow up with .

## 2020-06-24 NOTE — PLAN OF CARE
VN cued into room to complete admit assessment, VIP model introduced, VN working alongside bedside treatment team.  Plan of care reviewed with patient. Patient verbalized understanding. Patient informed of fall risk and fall precautions, call light within reach, 2x bed rails. Patient notified to ask staff for assistance and pt verbalized complete understanding. Time allowed for questions. Will continue to monitor and intervene as needed.

## 2020-06-24 NOTE — PLAN OF CARE
Problem: Occupational Therapy Goal  Goal: Occupational Therapy Goal  Description: Goals to be met by: 7/24/20     Patient will increase functional independence with ADLs by performing:    LE Dressing with Minimal Assistance.  Grooming while standing with Contact Guard Assistance.  Toileting from bedside commode with Minimal Assistance for hygiene and clothing management.   Supine to sit with Supervision.  Step transfer with Contact Guard Assistance  Toilet transfer to bedside commode with Contact Guard Assistance.  Upper extremity exercise program x10 reps per handout, with independence.    Outcome: Ongoing, Progressing       Despite patient's co-morbidities, including HTN, basal cell carcinoma patient was living in community setting functioning at independent level for ADLs, and mobility prior to this event, including driving.  There is an expectation of returning to prior level of function to maintain independence thus avoiding readmission.  Patient's clinical condition meets full Inpatient Rehab (IPR) criteria; including the ability to actively participate in 3 hours of therapy.  A lower level of care(SNF) cannot provide the interdisciplinary treatment approach needed.     Pt participating in bed mobility with CGA- Min A and standing trials alongside EOB. Pt requires assist to maintain PWB to LLE. Will require continued therapy services in order to return to independent level of function.

## 2020-06-24 NOTE — PLAN OF CARE
Pt lives in [place] with her  Ricky.  She was independent at home until last Wednesday when she tripped and fell in her kitchen.  Pt states she would like to discharge home with needed equipment and the help of her .  She says he is very active and will provide all the help she needs.  Will wait for PT/OT.  White board updated with CM name and contact information.  Discharge brochure provided.  Pt encouraged to call with any questions or concerns.  Cm will continue to follow pt through transitions of care and assist with any discharge needs.     06/24/20 1038   Discharge Assessment   Assessment Type Discharge Planning Assessment   Confirmed/corrected address and phone number on facesheet? Yes   Assessment information obtained from? Patient   Communicated expected length of stay with patient/caregiver yes   Prior to hospitilization cognitive status: Alert/Oriented;Risk of Harm to Self/Others   Prior to hospitalization functional status: Independent   Current cognitive status: Alert/Oriented   Current Functional Status: Needs Assistance;Assistive Equipment   Facility Arrived From: home   Lives With spouse   Able to Return to Prior Arrangements yes   Is patient able to care for self after discharge? Unable to determine at this time (comments)   Who are your caregiver(s) and their phone number(s)? Ricky (pt's spouse)   Patient's perception of discharge disposition home health   Readmission Within the Last 30 Days no previous admission in last 30 days   Patient currently being followed by outpatient case management? No   Patient currently receives any other outside agency services? No   Equipment Currently Used at Home walker, rolling   Do you have any problems affording any of your prescribed medications? No   Is the patient taking medications as prescribed? yes   Does the patient have transportation home? Yes   Transportation Anticipated family or friend will provide   Does the patient receive services at  [facility]? No   Discharge Plan A Home Health   Discharge Plan B Skilled Nursing Facility   DME Needed Upon Discharge    (TBD)   Patient/Family in Agreement with Plan yes     Anthony Barnes RN,   [phone]

## 2020-06-24 NOTE — PROGRESS NOTES
"U Internal Medicine Resident Progress Note   Pt Name: Yanni Car    Room: K509/K509 A   Admitted On: 2020   Today: 2020      Subjective:        Pt reports feeling well overnight.  Denies any fevers, chills, nausea, vomiting, constipation, diarrhea, abdominal pain, chest pain SOB.  Reports pain is well controlled.          Objective:   Last 24 Hour Vital Signs:   BP  Min: 129/70  Max: 163/70  Temp  Av.2 °F (36.8 °C)  Min: 97 °F (36.1 °C)  Max: 98.8 °F (37.1 °C)  Pulse  Av.1  Min: 71  Max: 81  Resp  Av.7  Min: 16  Max: 21  SpO2  Av.8 %  Min: 86 %  Max: 95 %  Height  Av' 6" (167.6 cm)  Min: 5' 6" (167.6 cm)  Max: 5' 6" (167.6 cm)  Weight  Av.8 kg (184 lb 10.4 oz)  Min: 83.6 kg (184 lb 4.9 oz)  Max: 83.9 kg (185 lb)   I/O last 3 completed shifts:  In: 125 [P.O.:125]  Out: 100 [Urine:100]      Physical Examination:      Gen: Alert to person, place and time. Well appearing, non toxic, NAD.    Head: Atraumatic, normocephalic   Neck: No JVD present, neck supple, no LAD   Eyes: Pupils equal and reactive to light,extra-ocular eye movements intact,   Mouth: no erythema, exudates, or lesions present in oropharynx   CV: RRR, no rubs, gallops, or murmurs.      Lungs: CTAB no crackles, wheezing, or rhonchi.  No fremitus noted.     Abd: Soft, non tender, non distended, bowel sounds present. No rebound tenderness or guarding present.     EXT: 2+ radial and dorsalis pedis pulses bilaterally.  No edema or cyanosis noted.    Neuro: CN II: pupils equal, reactive to light. CN III, IV, VI: extra-ocular muscles intact. CN V: sensation to light touch intact throughout face bilaterally. CN VII: upper and lower facial muscles intact. CN XI: sternocleidomastoid intact movement against resistance. CN XII: Tongue movements intact.  No focal neuro deficits noted.  UE/LE muscles move against resistance with no problem. Sensation intact in LE bilaterally. Vertebral spine tenderness lower lumbar " region  Skin: Warm and dry.  No jaundice noted.       Laboratory:     CBC:   Recent Labs   Lab 06/23/20 1931   WBC 6.83   HGB 13.6   HCT 41.3   MCV 90   RDW 14.2      , CMP:   Recent Labs   Lab 06/23/20 1931      K 3.7      CO2 25   *   BUN 22*   CREATININE 1.03   CALCIUM 9.5   AST 51*   ALT 44   ALKPHOS 272*   , Hgb A1c:   Lab Results   Component Value Date    HGBA1C 6.1 (H) 06/23/2020   , Magnesium:   Lab Results   Component Value Date    MG 2.3 06/23/2020   , Phosphorus:   Lab Results   Component Value Date    PHOS 3.9 06/23/2020          Other Results:        Radiology:   X-Ray Chest AP Portable   Final Result      As above described.         Electronically signed by: Amber Read   Date:    06/24/2020   Time:    00:41      CT Pelvis Without Contrast   Final Result      1. There is an acute appearing vertical fracture on the left side of the sacrum.  There is an acute appearing fracture involving the anterior column of the left acetabulum. There is an acute appearing fracture through the midportion of the left inferior pubic ramus.  This is consistent with the patient's history.   2. There is a small amount of hyperdense fluid posterior to the rectum.  This is characteristic of hemorrhage.   3. There is a marked amount of atherosclerosis.   All CT scans at this facility use dose modulation, iterative reconstruction, and/or weight base dosing when appropriate to reduce radiation dose when appropriate to reduce radiation dose to as low as reasonably achievable.         Electronically signed by: Kenroy Young MD   Date:    06/23/2020   Time:    14:43      CT Lumbar Spine Without Contrast   Final Result      1. There is an acute appearing fracture in the superior endplate of L3. There is an acute appearing vertical fracture on the left side of the sacrum.  This is consistent with the patient's history.   2. There is grade 1 anterolisthesis of L3 on L4. There is grade 1  anterolisthesis of L4 on L5.   3. There is a minimal amount of levoconvex curvature of the thoracolumbar spine.   4. The common bile duct is dilated.  It has a diameter of 13 mm.  If additional imaging evaluation is clinically indicated, I recommend consideration of an ERCP or MRCP.   5. There is a small diverticulum off of the posterior aspect of the 2nd portion of the duodenum.   6. There are calcifications scattered throughout the liver and spleen.  This is characteristic of a prior granulomatous infection.   7. There is a marked amount of atherosclerosis.   All CT scans at this facility use dose modulation, iterative reconstruction, and/or weight base dosing when appropriate to reduce radiation dose when appropriate to reduce radiation dose to as low as reasonably achievable.         Electronically signed by: Kenroy Young MD   Date:    06/23/2020   Time:    14:35       [employer]    (Results Pending)       I have reviewed the above reports as well as previous records.      Current Medications:      Infusions:         Scheduled:    amiodarone  200 mg Oral Daily    amLODIPine  10 mg Oral Daily    aspirin  81 mg Oral Daily    calcium carbonate  500 mg Oral BID    cholecalciferol (vitamin D3)  400 Units Oral Daily    cilostazoL  100 mg Oral BID    furosemide  20 mg Oral Daily    metoprolol tartrate  50 mg Oral BID    nicotine  1 patch Transdermal Daily    omega-3 fatty acids-fish oil  1 capsule Oral Daily    rosuvastatin  10 mg Oral QHS         PRN:   albuterol-ipratropium, dextrose 50 % in water (D50W), dextrose 50 % in water (D50W), glucagon (human recombinant), glucose, glucose, insulin aspart U-100, morphine, oxyCODONE-acetaminophen, sodium chloride 0.9%         Assessment:      Yanni Car is a 79 y.o.female with significant pmhx of HTN and arthritis who is admitted with multiple fractures.  Also noted to have common bile duct dilatation.         Plan:      Acute left side sacral fracture  and Acute fracture anterior Left acetabulum s/p mechanical fall   -noted on CT pelvis done in ED  -VSS, xrays done a week ago negative for fracture   -consult placed to Orthopedic surgery, PT/OT consult      Acute fracture of L3 s/p mechanical fall   -noted on CT lumbar spine  -VSS, xrays done a week ago negative for fracture   -consult ortho as above, PT/OT as above      Wheezing likely related to prolonged smoking history  -Duonebs q6  -F/u with PCP for possible COPD  -Supp oxygen as necessary     Common Bile Duct dilation   -incidentally found on CT lumber spine   -RUQ US. Alk phos also elevated     HTN  -/65  -resume home medications: Amlodipine 10 mg daily, lopressor 50 mg BID      Diet:Cardiac  DVT ppx:SCD's  Dispo:pain control tonight. Ortho consult in AM. PT/OT eval in AM as well       Felice Weiner MD  [facility] Internal Medicine HO-I  [facility] Internal Medicine Team A    [facility] Medicine Hospitalist Pager numbers:   [facility] Hospitalist Medicine Team A (Dom/Aric):          [phone]  [facility] Hospitalist Medicine Team B (Fredo/Yeyo):        [phone]

## 2020-06-24 NOTE — PROGRESS NOTES
Smoking cessation education provided. Pt denies nicotine withdrawal symptoms. Pt is a 1 pack per day cigarette smoker x 64 years. She states not ready to quit- declines enrollment in the [employer]. Handout provided for Ambulatory Smoking Cessation program in the event pt should require additional resources following hospital discharge.

## 2020-06-24 NOTE — CONSULTS
"Subjective:      Patient ID: Yanni Car is a 79 y.o. female.    Chief Complaint: left hip pain ("I fell last Wednesday on my hip and it is still hurting and I am not able to walk on it" pulses good)      HPI  Yanni Car is a  79 y.o. female presenting today for left sacral fracture.  There was a history of trauma.  Onset of symptoms began 1 week ago after a fall she sustained at home  She was seen in emergency room at [facility] initially and discharged home  She returned to [facility] last night with inability to ambulate due to pain  A CT scan at that time showed a minimally displaced sacral fracture and pubic ramus fracture on the left side she is transferred to [place] for physical therapy and possible skilled nursing placement.      Review of patient's allergies indicates:   Allergen Reactions    Penicillins Swelling         Current Facility-Administered Medications   Medication Dose Route Frequency Provider Last Rate Last Dose    albuterol-ipratropium 2.5 mg-0.5 mg/3 mL nebulizer solution 3 mL  3 mL Nebulization Q6H PRN Julien Tineo MD        amiodarone tablet 200 mg  200 mg Oral Daily Julien Tineo MD        amLODIPine tablet 10 mg  10 mg Oral Daily Julien Tineo MD        aspirin EC tablet 81 mg  81 mg Oral Daily Julien Tineo MD        calcium carbonate 200 mg calcium (500 mg) chewable tablet 500 mg  500 mg Oral BID Anamaria Corbett MD        cholecalciferol (vitamin D3) capsule/tablet 400 Units  400 Units Oral Daily Anamaria Corbett MD        cilostazoL tablet 100 mg  100 mg Oral BID Julien Tineo MD        dextrose 50 % in water (D50W) injection 12.5 g  12.5 g Intravenous PRN Julien Tineo MD        dextrose 50 % in water (D50W) injection 25 g  25 g Intravenous PRN Julien Tineo MD        furosemide tablet 20 mg  20 mg Oral Daily Julien Tineo MD        glucagon (human recombinant) injection 1 mg  1 mg Intramuscular PRN Julien Tineo MD        glucose chewable tablet 16 g  16 g Oral PRN " "Julien Tineo MD        glucose chewable tablet 24 g  24 g Oral PRN Julien Tineo MD        insulin aspart U-100 pen 1-10 Units  1-10 Units Subcutaneous QID (AC + HS) PRN Julien Tineo MD        metoprolol tartrate (LOPRESSOR) tablet 50 mg  50 mg Oral BID Julien Tineo MD        morphine injection 2 mg  2 mg Intravenous Q6H PRN Julien Tineo MD        nicotine 21 mg/24 hr 1 patch  1 patch Transdermal Daily Julien Tineo MD        omega-3 fatty acids-fish oil 340-1,000 mg capsule 1 capsule  1 capsule Oral Daily Julien Tineo MD        oxyCODONE-acetaminophen 5-325 mg per tablet 1 tablet  1 tablet Oral Q6H PRN Julien Tineo MD   1 tablet at 06/23/20 2331    rosuvastatin tablet 10 mg  10 mg Oral QHS Julien Tineo MD        sodium chloride 0.9% flush 10 mL  10 mL Intravenous PRN Julien Tineo MD           Past Medical History:   Diagnosis Date    Arthritis     Basal cell carcinoma     Hypertension     PAF (paroxysmal atrial fibrillation) 8/17/2016    In NSR today-on amiodarone [id number] 4 with 4% risk of yearly CVA  Afib was related to SIRS in 1/2012 She was admitted for pneumonia   1/2019:   Normal EF  Normal diastolic parameters  No valvular disease             Past Surgical History:   Procedure Laterality Date    HYSTERECTOMY      mohs repair         Review of Systems:  ROS    OBJECTIVE:     PHYSICAL EXAM:  Height: 5' 6" (167.6 cm) Weight: 83.6 kg (184 lb 4.9 oz)  Vitals:    06/24/20 0434 06/24/20 0501 06/24/20 0714 06/24/20 0729   BP: (!) 149/71   131/66   Pulse: 78  77 75   Resp: 19   18   Temp: 98.7 °F (37.1 °C)   98.1 °F (36.7 °C)   TempSrc: Oral   Oral   SpO2: (!) 91% (!) 94%     Weight:       Height:         Well developed, well nourished female in no acute distress  Alert and oriented x 3  HEENT- Normal exam  Lungs- Clear to auscultation  Heart- Regular rate and rhythm  Abdomen- Soft nontender  Extremity exam- examination lower extremities she is able to move both legs and tolerate range of motion of " both hips with minimal pain  She is tender on the left sacral area and groin area  He does have difficulty ambulating especially with weight-bearing on the left side    RADIOGRAPHS:  CT scan of the pelvis shows a minimally displaced sacral fracture on the left side and a inferior pin inferior pubic ramus fracture  Comments: I have personally reviewed the imaging and I agree with the above radiologist's report.    ASSESSMENT/PLAN:     IMPRESSION:  Left sacral and pubic ramus fracture minimally displaced    PLAN:  These fractures will be treated non operatively.  The patient will be mobilized with physical therapy partial weight-bearing on the left leg to tolerance  She may require skilled nursing placement       - We talked at length about the anatomy and pathophysiology of @DX@        Disclaimer: This note has been generated using voice-recognition software. There may be typographical errors that have been missed during proof-reading.

## 2020-06-24 NOTE — PT/OT/SLP EVAL
Occupational Therapy   Evaluation    Name: Yanni Car  MRN: [id number]  Admitting Diagnosis:  Acute fracture      Recommendations:     Discharge Recommendations: nursing facility, skilled, rehabilitation facility  Discharge Equipment Recommendations:  bedside commode, bath bench  Barriers to discharge:  Decreased caregiver support    Assessment:     Yanni Car is a 79 y.o. female with a medical diagnosis of Acute fracture.  She presents with L sacrum fractures; PWB LLE . Performance deficits affecting function: weakness, gait instability, impaired balance, impaired endurance, decreased lower extremity function, decreased ROM, decreased safety awareness, impaired self care skills, pain, orthopedic precautions, impaired functional mobilty, edema.      Despite patient's co-morbidities, including HTN, basal cell carcinoma patient was living in community setting functioning at independent level for ADLs, and mobility prior to this event, including driving.  There is an expectation of returning to prior level of function to maintain independence thus avoiding readmission.  Patient's clinical condition meets full Inpatient Rehab (IPR) criteria; including the ability to actively participate in 3 hours of therapy.  A lower level of care(SNF) cannot provide the interdisciplinary treatment approach needed.     Pt participating in bed mobility with CGA- Min A and standing trials alongside EOB. Pt requires assist to maintain PWB to LLE. Will require continued therapy services in order to return to independent level of function.     Rehab Prognosis: Good; patient would benefit from acute skilled OT services to address these deficits and reach maximum level of function.       Plan:     Patient to be seen 5 x/week to address the above listed problems via self-care/home management, therapeutic activities, therapeutic exercises  · Plan of Care Expires: 07/24/20  · Plan of Care Reviewed with: patient    Subjective     Chief Complaint:  pain in LLE/sacrum  Patient/Family Comments/goals: return to PLOF     Occupational Profile:  Living Environment: with spouse in [facility], 4-5 steps to enter, R rail, WIS   Previous level of function: independent; driving; performing IADLs; pt reports for the past 6 days since fall, she has not been out of bed due to pain and inability to walk; she is able to move in bed to assist her  in placing a bed pan for toileting; requires assist all ADLs excluding feeding   Equipment Used at Home:  none(spouse purchased RW, however, pt has been unable to use)  Assistance upon Discharge: from spouse     Pain/Comfort:  · Pain Rating 1: 0/10  · Pain Rating Post-Intervention 1: 0/10    Patients cultural, spiritual, Nondenominational conflicts given the current situation:      Objective:     Communicated with: ernesto prior to session.      General Precautions: Standard, fall   Orthopedic Precautions:LLE partial weight bearing   Braces: N/A     Occupational Performance:    Bed Mobility:    · Patient completed Scooting/Bridging with contact guard assistance  · Patient completed Supine to Sit with minimum assistance  · Patient completed Sit to Supine with moderate assistance    Functional Mobility/Transfers:  · Patient completed Sit <> Stand Transfer with moderate assistance and maximal assistance  with  rolling walker   · Functional Mobility: max A one slide step to R with RW     Activities of Daily Living:  · Lower Body Dressing: total assistance    · Toileting: total assistance      Cognitive/Visual Perceptual:  WFL; pt does appear slightly impaired from pain medications     Physical Exam:  Balance:    -       sitting balance WFL; standing mod/max A  Postural examination/scapula alignment:    -       Rounded shoulders  -       Forward head  Skin integrity: Visible skin intact  Upper Extremity Range of Motion:   BUE ROM WFL   Upper Extremity Strength:  BUE WFL for pt's needs    Strength:  Good     AMPAC 6 Click ADL:  AMPAC Total Score:  18    Treatment & Education:  Pt performing skills as above   Educated on PWB and use of RW prior to OOB axs   Pt requires rest breaks 2/2 pain with movement   Min A sup>sit to bring trunk to midline   CGA seated scooting to EOB; lateral seated scooting mod A- max A   Stood from EOB x 2 trials with RW; assist under L foot to maintain PWB; first trial max A and 2nd mod A   Only tolerated 1 slide step to R with max A   Returned to supine mod A   Educated on supine therex for performance   Education:    Patient left supine with all lines intact, call button in reach, bed alarm on and nsg notified    GOALS:   Multidisciplinary Problems     Occupational Therapy Goals        Problem: Occupational Therapy Goal    Goal Priority Disciplines Outcome Interventions   Occupational Therapy Goal     OT, PT/OT Ongoing, Progressing    Description: Goals to be met by: 7/24/20     Patient will increase functional independence with ADLs by performing:    LE Dressing with Minimal Assistance.  Grooming while standing with Contact Guard Assistance.  Toileting from bedside commode with Minimal Assistance for hygiene and clothing management.   Supine to sit with Supervision.  Step transfer with Contact Guard Assistance  Toilet transfer to bedside commode with Contact Guard Assistance.  Upper extremity exercise program x10 reps per handout, with independence.                     History:     Past Medical History:   Diagnosis Date    Arthritis     Basal cell carcinoma     Hypertension     PAF (paroxysmal atrial fibrillation) 8/17/2016    In NSR today-on amiodarone [id number] 4 with 4% risk of yearly CVA  Afib was related to SIRS in 1/2012 She was admitted for pneumonia   1/2019:   Normal EF  Normal diastolic parameters  No valvular disease             Past Surgical History:   Procedure Laterality Date    HYSTERECTOMY      mohs repair         Time Tracking:     OT Date of Treatment: 06/24/20  OT Start Time: 0901  OT Stop Time: 0935  OT  Total Time (min): 34 min    Billable Minutes:Evaluation 10  Therapeutic Activity 13 co treatment dali Yin, OT  6/24/2020

## 2020-06-24 NOTE — PLAN OF CARE
Problem: Physical Therapy Goal  Goal: Physical Therapy Goal  Description: Goals to be met by: 20     Patient will increase functional independence with mobility by performin. Supine <> sit with MInimal Assistance  2. Sit to stand transfer with Minimal Assistance  3. Bed to chair transfer with Minimal Assistance using Rolling Walker while maintaining LLE PWB  4. Gait  x 10 feet with Minimal Assistance using Rolling Walker while maintaining LLE PWB status  5. Lower extremity exercise program x 10 reps per handout, with supervision    Outcome: Ongoing, Progressing     PT evaluation completed, note to follow. Pt with LLE PWB status and completed 2 stands with max A for 1st stand and mod A for 2nd stand, took 1 side slide step right with mod/max A x 2.   Despite patient's co-morbidities, patient was living in community setting functioning at independent level for ADLs, and mobility prior to this event.  There is an expectation of returning to prior level of function to maintain independence thus avoiding readmission.  Patient's clinical condition meets full Inpatient Rehab (IPR) criteria; including the ability to actively participate in 3 hours of therapy.

## 2020-06-24 NOTE — PLAN OF CARE
attempted to call in locet.  Spoke with [facility] who informed  they are having high volume of calls.  left contact info and waiting on call back.       06/24/20 1610   Post-Acute Status   Post-Acute Authorization Placement   Post-Acute Placement Status Pending State Certification   Discharge Delays None known at this time   Discharge Plan   Discharge Plan A Skilled Nursing Facility

## 2020-06-25 ENCOUNTER — TELEPHONE (OUTPATIENT)
Dept: ORTHOPEDICS | Facility: CLINIC | Age: 80
End: 2020-06-25

## 2020-06-25 VITALS
RESPIRATION RATE: 20 BRPM | HEART RATE: 71 BPM | SYSTOLIC BLOOD PRESSURE: 113 MMHG | HEIGHT: 66 IN | DIASTOLIC BLOOD PRESSURE: 59 MMHG | WEIGHT: 194 LBS | OXYGEN SATURATION: 92 % | TEMPERATURE: 99 F | BODY MASS INDEX: 31.18 KG/M2

## 2020-06-25 LAB
ALBUMIN SERPL BCP-MCNC: 3 G/DL (ref 3.5–5.2)
ALP SERPL-CCNC: 224 U/L (ref 55–135)
ALT SERPL W/O P-5'-P-CCNC: 29 U/L (ref 10–44)
ANION GAP SERPL CALC-SCNC: 8 MMOL/L (ref 8–16)
AST SERPL-CCNC: 24 U/L (ref 10–40)
BASOPHILS # BLD AUTO: 0.04 K/UL (ref 0–0.2)
BASOPHILS NFR BLD: 0.7 % (ref 0–1.9)
BILIRUB SERPL-MCNC: 0.4 MG/DL (ref 0.1–1)
BUN SERPL-MCNC: 24 MG/DL (ref 8–23)
CALCIUM SERPL-MCNC: 9.2 MG/DL (ref 8.7–10.5)
CHLORIDE SERPL-SCNC: 103 MMOL/L (ref 95–110)
CO2 SERPL-SCNC: 26 MMOL/L (ref 23–29)
CREAT SERPL-MCNC: 1.2 MG/DL (ref 0.5–1.4)
DIFFERENTIAL METHOD: ABNORMAL
EOSINOPHIL # BLD AUTO: 0.1 K/UL (ref 0–0.5)
EOSINOPHIL NFR BLD: 1.2 % (ref 0–8)
ERYTHROCYTE [DISTWIDTH] IN BLOOD BY AUTOMATED COUNT: 14.1 % (ref 11.5–14.5)
EST. GFR  (AFRICAN AMERICAN): 50 ML/MIN/1.73 M^2
EST. GFR  (NON AFRICAN AMERICAN): 43 ML/MIN/1.73 M^2
GLUCOSE SERPL-MCNC: 136 MG/DL (ref 70–110)
HCT VFR BLD AUTO: 38.8 % (ref 37–48.5)
HGB BLD-MCNC: 12.9 G/DL (ref 12–16)
IMM GRANULOCYTES # BLD AUTO: 0.06 K/UL (ref 0–0.04)
IMM GRANULOCYTES NFR BLD AUTO: 1 % (ref 0–0.5)
LYMPHOCYTES # BLD AUTO: 1.3 K/UL (ref 1–4.8)
LYMPHOCYTES NFR BLD: 20.8 % (ref 18–48)
MCH RBC QN AUTO: 29.5 PG (ref 27–31)
MCHC RBC AUTO-ENTMCNC: 33.2 G/DL (ref 32–36)
MCV RBC AUTO: 89 FL (ref 82–98)
MONOCYTES # BLD AUTO: 0.6 K/UL (ref 0.3–1)
MONOCYTES NFR BLD: 9.7 % (ref 4–15)
NEUTROPHILS # BLD AUTO: 4 K/UL (ref 1.8–7.7)
NEUTROPHILS NFR BLD: 66.6 % (ref 38–73)
NRBC BLD-RTO: 0 /100 WBC
PLATELET # BLD AUTO: 376 K/UL (ref 150–350)
PMV BLD AUTO: 8.7 FL (ref 9.2–12.9)
POCT GLUCOSE: 121 MG/DL (ref 70–110)
POCT GLUCOSE: 125 MG/DL (ref 70–110)
POTASSIUM SERPL-SCNC: 3.8 MMOL/L (ref 3.5–5.1)
PROT SERPL-MCNC: 6.7 G/DL (ref 6–8.4)
RBC # BLD AUTO: 4.38 M/UL (ref 4–5.4)
SODIUM SERPL-SCNC: 137 MMOL/L (ref 136–145)
WBC # BLD AUTO: 6.01 K/UL (ref 3.9–12.7)

## 2020-06-25 PROCEDURE — 94761 N-INVAS EAR/PLS OXIMETRY MLT: CPT

## 2020-06-25 PROCEDURE — 25000003 PHARM REV CODE 250: Performed by: INTERNAL MEDICINE

## 2020-06-25 PROCEDURE — 97530 THERAPEUTIC ACTIVITIES: CPT

## 2020-06-25 PROCEDURE — 25000003 PHARM REV CODE 250: Performed by: STUDENT IN AN ORGANIZED HEALTH CARE EDUCATION/TRAINING PROGRAM

## 2020-06-25 PROCEDURE — G0378 HOSPITAL OBSERVATION PER HR: HCPCS

## 2020-06-25 PROCEDURE — 80053 COMPREHEN METABOLIC PANEL: CPT

## 2020-06-25 PROCEDURE — 85025 COMPLETE CBC W/AUTO DIFF WBC: CPT

## 2020-06-25 PROCEDURE — 27000221 HC OXYGEN, UP TO 24 HOURS

## 2020-06-25 PROCEDURE — 36415 COLL VENOUS BLD VENIPUNCTURE: CPT

## 2020-06-25 RX ORDER — OXYCODONE AND ACETAMINOPHEN 5; 325 MG/1; MG/1
1 TABLET ORAL EVERY 8 HOURS PRN
Qty: 15 TABLET | Refills: 0 | Status: SHIPPED | OUTPATIENT
Start: 2020-06-25 | End: 2020-06-30

## 2020-06-25 RX ORDER — ACETAMINOPHEN 325 MG/1
650 TABLET ORAL EVERY 6 HOURS PRN
Status: DISCONTINUED | OUTPATIENT
Start: 2020-06-25 | End: 2020-06-25 | Stop reason: HOSPADM

## 2020-06-25 RX ADMIN — CALCIUM CARBONATE (ANTACID) CHEW TAB 500 MG 500 MG: 500 CHEW TAB at 08:06

## 2020-06-25 RX ADMIN — ASPIRIN 81 MG: 81 TABLET, COATED ORAL at 08:06

## 2020-06-25 RX ADMIN — METOPROLOL TARTRATE 50 MG: 50 TABLET, FILM COATED ORAL at 08:06

## 2020-06-25 RX ADMIN — OXYCODONE HYDROCHLORIDE AND ACETAMINOPHEN 1 TABLET: 5; 325 TABLET ORAL at 03:06

## 2020-06-25 RX ADMIN — AMLODIPINE BESYLATE 10 MG: 5 TABLET ORAL at 08:06

## 2020-06-25 RX ADMIN — AMIODARONE HYDROCHLORIDE 200 MG: 200 TABLET ORAL at 08:06

## 2020-06-25 RX ADMIN — OXYCODONE HYDROCHLORIDE AND ACETAMINOPHEN 1 TABLET: 5; 325 TABLET ORAL at 06:06

## 2020-06-25 RX ADMIN — CILOSTAZOL 100 MG: 50 TABLET ORAL at 08:06

## 2020-06-25 RX ADMIN — FUROSEMIDE 20 MG: 20 TABLET ORAL at 08:06

## 2020-06-25 RX ADMIN — Medication 1 CAPSULE: at 08:06

## 2020-06-25 RX ADMIN — CHOLECALCIFEROL TAB 10 MCG (400 UNIT) 400 UNITS: 10 TAB at 08:06

## 2020-06-25 NOTE — PLAN OF CARE
paged the team and requested SNF orders.     06/25/20 5401   Post-Acute Status   Post-Acute Authorization Placement   Post-Acute Placement Status Awaiting Orders for SNF   Discharge Delays None known at this time   Discharge Plan   Discharge Plan A Skilled Nursing Facility

## 2020-06-25 NOTE — PLAN OF CARE
[facility] accepted pt.  I spoke with Montana at [facility] and she says pt will need 2 negative covid tests 24 hours apart to be accepted.  I discussed this with pt and her spouse at her bedside.  They both agree for pt to [facility].  I called Vicky with [facility] and she is calling Ricky now to set up an appt time to sign paperwork.     06/25/20 0852   Post-Acute Status   Post-Acute Authorization Placement  (SNF)     Anthony Barnes RN,   [phone]

## 2020-06-25 NOTE — PLAN OF CARE
06/25/20 1437   Final Note   Assessment Type Final Discharge Note   Anticipated Discharge Disposition SNF   What phone number can be called within the next 1-3 days to see how you are doing after discharge? [phone]   Hospital Follow Up  Appt(s) scheduled? Yes   Discharge plans and expectations educations in teach back method with documentation complete? Yes   Right Care Referral Info   Post Acute Recommendation SNF / Sub-Acute Rehab   Facility Name [facility] [place], State [place]

## 2020-06-25 NOTE — PLAN OF CARE
received clearance from Vicky at [facility], facility transfer orders have been reviewed, admission paperwork completed and she has [facility] authorization.    The patient can admit today for skilled level of care. She will admit to room [address]B- [facility].  Bedside RN to call report to Ariela Morales [phone].    Packet given to bedside RN Kayley. Patient informed of discharge and agreeable to plan.       06/25/20 1430   Post-Acute Status   Post-Acute Authorization Placement   Post-Acute Placement Status Set-up Complete   Discharge Delays None known at this time   Discharge Plan   Discharge Plan A Skilled Nursing Facility

## 2020-06-25 NOTE — TELEPHONE ENCOUNTER
----- Message from Jonah Torres Jr., MD sent at 6/25/2020  3:55 PM CDT -----  Followup in 3-4 weeks  ----- Message -----  From: Carol Doe MA  Sent: 6/25/2020   3:02 PM CDT  To: Jonah Torres Jr., MD    Please Advise on when you would like patient to come in. Thanks!  ----- Message -----  From: Marisol Barnes RN  Sent: 6/25/2020   2:01 PM CDT  To: Brian Neff    Good afternoon,  This pt fell resulting in fracture.  Dr. Torres saw her in the hospital but she did not require surgery.  She will need a hospital follow-up appt please.   Thank you for your assistance,    Anthony Barnes, RN,   [phone]

## 2020-06-25 NOTE — PLAN OF CARE
Paged attending team and requested clarification of orders.       06/25/20 1114   Post-Acute Status   Post-Acute Authorization Placement   Post-Acute Placement Status Awaiting Clarification Orders   Discharge Delays None known at this time   Discharge Plan   Discharge Plan A Skilled Nursing Facility

## 2020-06-25 NOTE — PLAN OF CARE
Problem: Occupational Therapy Goal  Goal: Occupational Therapy Goal  Description: Goals to be met by: 7/24/20     Patient will increase functional independence with ADLs by performing:    LE Dressing with Minimal Assistance.  Grooming while standing with Contact Guard Assistance.  Toileting from bedside commode with Minimal Assistance for hygiene and clothing management.   Supine to sit with Supervision.  Step transfer with Contact Guard Assistance  Toilet transfer to bedside commode with Contact Guard Assistance.  Upper extremity exercise program x10 reps per handout, with independence.    Outcome: Ongoing, Progressing       Pt progressing towards goals. Requires increased rest breaks, maximal encouragement and education on benefits of participation in therapy services. Cont OT POC. SNF at d/c per chart

## 2020-06-25 NOTE — PLAN OF CARE
[facility]    FACILITY TRANSFER ORDERS      Patient Name: Yanni Car  YOB: 1940    PCP: Rajni No MD   PCP Address: [address] / [place] [address]  PCP Phone Number: [phone]  PCP Fax: [phone]    Encounter Date: 06/25/2020    Admit to: [facility]    Vital Signs:  Routine    Diagnoses:   Active Hospital Problems    Diagnosis  POA    *Acute fracture [T14.8XXA]  Yes    Closed fracture of left inferior pubic ramus [S32.592A]  Yes    Closed nondisplaced fracture of anterior wall of left acetabulum [S32.415A]  Yes    Wheezing [R06.2]  Yes    Essential hypertension [I10]  Yes    Fx sacrum/coccyx-closed [S32.10XA, S32.2XXA]  Yes    Fracture of third lumbar vertebra [S32.039A]  Yes    Glaucoma, right eye [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]  Yes    CKD (chronic kidney disease) stage 3, GFR 30-59 ml/min [N18.3]  Yes    Common bile duct dilatation [K83.8]  Yes    Paroxysmal A-fib [I48.0]  Yes     In NSR today-on amiodarone  [id number] 4 with 4% risk of yearly CVA    Afib was related to SIRS in 1/2012  She was admitted for pneumonia      1/2019:    Normal EF   Normal diastolic parameters   No valvular disease                  Tobacco use [Z72.0]  Yes    PAD (peripheral artery disease) [I73.9]  Yes       S/p failed R reverse vein fem-pop done 8/2013   Failure within 2 to 4 weeks         JUDITH 10/2013     R 0.76   L 0.79      US 2013      R , pSFA 112, mSFA 259, dSFA 32, POP 48, PT 38, AT 27, DP 23  Failed fem-pop          JUDITH 1/2019     R NC with TBI 0.41   L 1.28 with TBI 0.41   Monophasic waveforms bilaterally       Hyperlipidemia [E78.5]  Yes      Resolved Hospital Problems   No resolved problems to display.       Allergies:  Review of patient's allergies indicates:   Allergen Reactions    Penicillins Swelling       Diet: cardiac diet    Activities: Activity as tolerated    Nursing: routine per facility      Labs: none n/a     CONSULTS:    Physical Therapy to evaluate and  treat.  and Occupational Therapy to evaluate and treat.    MISCELLANEOUS CARE:  none    WOUND CARE ORDERS  None    Medications: Review discharge medications with patient and family and provide education.      Current Discharge Medication List      START taking these medications    Details   oxyCODONE-acetaminophen (PERCOCET) 5-325 mg per tablet Take 1 tablet by mouth every 8 (eight) hours as needed for Pain.  Qty: 15 tablet, Refills: 0    Comments: Quantity prescribed more than 7 day supply? No         CONTINUE these medications which have NOT CHANGED    Details   amiodarone (PACERONE) 200 MG Tab TAKE 1 TABLET BY MOUTH EVERY DAY  Qty: 90 tablet, Refills: 3      amLODIPine (NORVASC) 10 MG tablet Take 1 tablet (10 mg total) by mouth once daily.  Qty: 90 tablet, Refills: 3      aspirin (ECOTRIN) 81 MG EC tablet Take 81 mg by mouth once daily.        calcium-vitamin D3 (CALCIUM 500 + D) 500 mg(1,250mg) -200 unit per tablet Take 1 tablet by mouth 2 (two) times daily with meals.      cilostazoL (PLETAL) 100 MG Tab TAKE 1 TABLET BY MOUTH TWO TIMES A DAY  Qty: 180 tablet, Refills: 3      fish oil-omega-3 fatty acids 300-1,000 mg capsule Take 2 g by mouth once daily.        furosemide (LASIX) 20 MG tablet TAKE 1 TABLET BY MOUTH EVERY DAY  Qty: 90 tablet, Refills: 3      metoprolol tartrate (LOPRESSOR) 50 MG tablet Take 1 tablet (50 mg total) by mouth 2 (two) times daily.  Qty: 180 tablet, Refills: 3      rosuvastatin (CRESTOR) 10 MG tablet TAKE 1 TABLET BY MOUTH EVERY EVENING  Qty: 90 tablet, Refills: 3         STOP taking these medications       RESTASIS 0.05 % ophthalmic emulsion Comments:   Reason for Stopping:                _________________________________  Luli Corbett MD  06/25/2020

## 2020-06-25 NOTE — PT/OT/SLP PROGRESS
Occupational Therapy   Treatment    Name: Yanni Car  MRN: [id number]  Admitting Diagnosis:  Acute fracture       Recommendations:     Discharge Recommendations: nursing facility, skilled  Discharge Equipment Recommendations:  bath bench, bedside commode  Barriers to discharge:  Decreased caregiver support    Assessment:     Yanni Car is a 79 y.o. female with a medical diagnosis of Acute fracture.  She presents with pelvic fractures . Performance deficits affecting function are weakness, gait instability, impaired balance, impaired endurance, decreased lower extremity function, decreased ROM, pain, decreased safety awareness, impaired self care skills, impaired functional mobilty.     Pt progressing towards goals. Requires increased rest breaks, maximal encouragement and education on benefits of participation in therapy services. Cont OT POC. SNF at d/c per chart     Rehab Prognosis:  Good; patient would benefit from acute skilled OT services to address these deficits and reach maximum level of function.       Plan:     Patient to be seen 5 x/week to address the above listed problems via self-care/home management, therapeutic activities, therapeutic exercises  · Plan of Care Expires: 07/24/20  · Plan of Care Reviewed with: patient, spouse    Subjective     Pain/Comfort:  · Pain Rating 1: (6-7)  · Location - Side 1: Left  · Location - Orientation 1: generalized  · Location 1: (pelvis)  · Pain Addressed 1: Reposition, Pre-medicate for activity, Distraction, Cessation of Activity, Nurse notified    Objective:     Communicated with: ernesto prior to session.     General Precautions: Standard, fall   Orthopedic Precautions:LLE partial weight bearing   Braces: N/A     Occupational Performance:     Bed Mobility:    · Patient completed Scooting/Bridging with moderate assistance  · Patient completed Supine to Sit with minimum assistance and moderate assistance  · Patient completed Sit to Supine with minimum assistance      Functional Mobility/Transfers:  · Patient completed Sit <> Stand Transfer with moderate assistance  with  rolling walker and and assist at LLE to maintain WB    · Functional Mobility: unable to perform     Activities of Daily Living:  · Lower Body Dressing: total assistance    · Toileting: total assistance        [facility] 6 Click ADL: 18    Treatment & Education:  Pt requires maximal verbal cues throughout session for WB status; pt also requires increased encouragement and education on WB status as well as reason for participation in therapy services  Pt unable to recall WB status, hand placement, and/or technique for transitions prior to axs   Min/mod A for bed mobility- appears in more pain than previous session   Stood from bed mod A x 2 trials with RW; pt requires assist at LLE to maintain PWB, however, pt removing LLE from therapist's foot (for tactile cue) and placing it for full weight bearing on ground despite verbal and tactile cues; reports increased pain and inability to tolerate further stands and/or axs and requesting to return to supine   Pt with soiled brief despite purewick in place; removed total A     Patient left supine with all lines intact, call button in reach, bed alarm on, nsg notified and  and KARO system present  presentEducation:      GOALS:   Multidisciplinary Problems     Occupational Therapy Goals        Problem: Occupational Therapy Goal    Goal Priority Disciplines Outcome Interventions   Occupational Therapy Goal     OT, PT/OT Ongoing, Progressing    Description: Goals to be met by: 7/24/20     Patient will increase functional independence with ADLs by performing:    LE Dressing with Minimal Assistance.  Grooming while standing with Contact Guard Assistance.  Toileting from bedside commode with Minimal Assistance for hygiene and clothing management.   Supine to sit with Supervision.  Step transfer with Contact Guard Assistance  Toilet transfer to bedside commode with Contact  Guard Assistance.  Upper extremity exercise program x10 reps per handout, with independence.                     Time Tracking:     OT Date of Treatment: 06/25/20  OT Start Time: 0941  OT Stop Time: 1012  OT Total Time (min): 31 min    Billable Minutes:Therapeutic Activity 10 co treatment with PT     Lesly Yin, OT  6/25/2020

## 2020-06-25 NOTE — PLAN OF CARE
Problem: Physical Therapy Goal  Goal: Physical Therapy Goal  Description: Goals to be met by: 20     Patient will increase functional independence with mobility by performin. Supine <> sit with MInimal Assistance  2. Sit to stand transfer with Minimal Assistance  3. Bed to chair transfer with Minimal Assistance using Rolling Walker while maintaining LLE PWB  4. Gait  x 10 feet with Minimal Assistance using Rolling Walker while maintaining LLE PWB status  5. Lower extremity exercise program x 10 reps per handout, with supervision    Outcome: Ongoing, Progressing     Pt completed 3 stands with RW and mod A with max cues for hand and foot placement, LLE PWB status, and motivation to participate in therapy session. Recommending SNF placement upon d/c.

## 2020-06-25 NOTE — PLAN OF CARE
made phone contact with long term access services and completed a level of care eligibility tool (LOCET) needed for nursing facility admission approval. Then faxed Level 1 Pre Admission Screening and Resident Review form to the state.       06/25/20 0903   Post-Acute Status   Post-Acute Authorization Placement   Post-Acute Placement Status Pending State Certification   Discharge Delays None known at this time   Discharge Plan   Discharge Plan A Skilled Nursing Facility

## 2020-06-25 NOTE — PLAN OF CARE
Patient Choice Form explained to patient. Patient signed and copy given.     notified bedside RN of patient's discharge today to SNF.

## 2020-06-25 NOTE — PLAN OF CARE
VN note:   Pt's chart, labs and vital signs reviewed, will be available to intervene if needed

## 2020-06-25 NOTE — PLAN OF CARE
sent [facility] SNF request via [facility] and left voicemail for Therese with [facility] notifying her of the request.     06/25/20 0900   Post-Acute Status   Post-Acute Authorization Placement   Discharge Delays None known at this time   Discharge Plan   Discharge Plan A Skilled Nursing Facility

## 2020-06-25 NOTE — PT/OT/SLP PROGRESS
Physical Therapy Treatment    Patient Name:  Yanni Car   MRN:  [id number]    Recommendations:     Discharge Recommendations:  nursing facility, skilled   Discharge Equipment Recommendations: bedside commode, bath bench   Barriers to discharge: Decreased caregiver support    Assessment:     Yanni Car is a 79 y.o. female admitted with a medical diagnosis of Acute fracture.  She presents with the following impairments/functional limitations:  weakness, impaired endurance, impaired self care skills, impaired functional mobilty, gait instability, impaired balance, decreased coordination, decreased upper extremity function, decreased lower extremity function, decreased safety awareness, pain, decreased ROM, orthopedic precautions. Pt completed 3 stands with RW and mod A with max cues for hand and foot placement, LLE PWB status, and motivation to participate in therapy session. Recommending SNF placement upon d/c.    Rehab Prognosis: Good; patient would benefit from acute skilled PT services to address these deficits and reach maximum level of function.    Recent Surgery: * No surgery found *      Plan:     During this hospitalization, patient to be seen daily to address the identified rehab impairments via gait training, therapeutic activities, therapeutic exercises and progress toward the following goals:    · Plan of Care Expires:  07/24/20    Subjective     Chief Complaint: wanting to return to bed and reporting feeling dizzy in sitting  Patient/Family Comments/goals: requires max motivation and education to participate in standing and attempt gait transfers - pt making jokes about wanting to stay in bed   Pain/Comfort:  · Pain Rating 1: (6-7/10)  · Location - Side 1: Left  · Location 1: sacral spine  · Pain Addressed 1: Pre-medicate for activity, Reposition, Distraction, Cessation of Activity, Nurse notified  · Pain Rating Post-Intervention 1: (not rated)      Objective:     Communicated with nurse villela prior to  session.  Patient found HOB elevated with bed alarm, peripheral IV upon PT entry to room.     General Precautions: Standard, fall   Orthopedic Precautions:LLE partial weight bearing   Braces: N/A     Functional Mobility:  · Bed Mobility:     · Scooting: moderate assistance  · Supine to Sit: minimum assistance and moderate assistance  · Sit to Supine: minimum assistance  · Transfers:     · Sit to Stand:  moderate assistance with rolling walker and assist at LLE to maintain LLE PWB status      AM-PAC 6 CLICK MOBILITY  Turning over in bed (including adjusting bedclothes, sheets and blankets)?: 2  Sitting down on and standing up from a chair with arms (e.g., wheelchair, bedside commode, etc.): 2  Moving from lying on back to sitting on the side of the bed?: 3  Moving to and from a bed to a chair (including a wheelchair)?: 1  Need to walk in hospital room?: 1  Climbing 3-5 steps with a railing?: 1  Basic Mobility Total Score: 10       Therapeutic Activities and Exercises:  Pt reporting not completing many LE supine exercises which she was educated on yesterday and asked to complete on her own as well. Re-educated on supine LE exercises: APs, heel slides, QS/GS.  Sat EOB with SBA.  Requires max motivation to participate in multiple standing bouts.  Requires max cues throughout for LLE PWB status and hand/foot placement for standing as pt with limited carryover between bouts of standing.  Stood 3x with mod A and requires therapist's foot under her LLE as well as verbal cueing to remind her to maintain LLE PWB.  Stood ~10-20 sec each stand and reporting pain and leans herself back into sitting.  Reporting dizziness and BP assessed at 140s/80s.  Reporting inability to tolerate further standing/activity, returned to supine.  Scooted up in supine with use of head board and cueing for LE placement.    Patient left HOB elevated with all lines intact, call button in reach, bed alarm on, nurse notified and pt's  and AVAsys  present..    GOALS:   Multidisciplinary Problems     Physical Therapy Goals        Problem: Physical Therapy Goal    Goal Priority Disciplines Outcome Goal Variances Interventions   Physical Therapy Goal     PT, PT/OT Ongoing, Progressing     Description: Goals to be met by: 20     Patient will increase functional independence with mobility by performin. Supine <> sit with MInimal Assistance  2. Sit to stand transfer with Minimal Assistance  3. Bed to chair transfer with Minimal Assistance using Rolling Walker while maintaining LLE PWB  4. Gait  x 10 feet with Minimal Assistance using Rolling Walker while maintaining LLE PWB status  5. Lower extremity exercise program x 10 reps per handout, with supervision                     Time Tracking:     PT Received On: 20  PT Start Time: 941     PT Stop Time: 1012  PT Total Time (min): 31 min co-tx with OT    Billable Minutes: Therapeutic Activity 20    Treatment Type: Treatment  PT/PTA: PT     PTA Visit Number: 0     Estefania Forte, PT  2020

## 2020-06-25 NOTE — PROGRESS NOTES
[facility] Internal Medicine Resident Progress Note   Pt Name: Yanni Car    Room: K509/K509 A   Admitted On: 2020   Today: 2020      Subjective:       Pt sitting up eating breakfast this morning on entering room this AM. Pt reports feeling well overnight.  Denies any fevers, chills, nausea, vomiting, constipation, diarrhea, abdominal pain, chest pain SOB.  Reports pain is well controlled.      Pt has been accepted to [facility], pt ok with this but would prefer [facility] asking if they are accepting patients.        Objective:   Last 24 Hour Vital Signs:   BP  Min: 128/60  Max: 157/63  Temp  Av.4 °F (36.9 °C)  Min: 98.1 °F (36.7 °C)  Max: 99 °F (37.2 °C)  Pulse  Av.6  Min: 61  Max: 82  Resp  Av  Min: 16  Max: 20  SpO2  Av.5 %  Min: 90 %  Max: 97 %  Weight  Av kg (194 lb 0.1 oz)  Min: 88 kg (194 lb 0.1 oz)  Max: 88 kg (194 lb 0.1 oz)   I/O last 3 completed shifts:  In: 125 [P.O.:125]  Out: 1250 [Urine:1250]      Physical Examination:      Gen: Alert to person, place and time. Well appearing, non toxic, NAD.    Head: Atraumatic, normocephalic   Neck: No JVD present, neck supple, no LAD   Eyes: Pupils equal and reactive to light,extra-ocular eye movements intact,   Mouth: no erythema, exudates, or lesions present in oropharynx   CV: RRR, no rubs, gallops, or murmurs.      Lungs: CTAB no crackles, wheezing, or rhonchi.  No fremitus noted.     Abd: Soft, non tender, non distended, bowel sounds present. No rebound tenderness or guarding present.     EXT: 2+ radial and dorsalis pedis pulses bilaterally.  No edema or cyanosis noted.    Neuro: CN II: pupils equal, reactive to light. CN III, IV, VI: extra-ocular muscles intact. Vertebral spine tenderness lower lumbar region  Skin: Warm and dry.  No jaundice noted.       Laboratory:     CBC:   Recent Labs   Lab 20  1931   WBC 6.83   HGB 13.6   HCT 41.3   MCV 90   RDW 14.2      , CMP:   Recent Labs   Lab 20  193       K 3.7      CO2 25   *   BUN 22*   CREATININE 1.03   CALCIUM 9.5   AST 51*   ALT 44   ALKPHOS 272*   , Hgb A1c:   Lab Results   Component Value Date    HGBA1C 6.1 (H) 06/23/2020   , Magnesium:   Lab Results   Component Value Date    MG 2.2 06/24/2020   , Phosphorus:   Lab Results   Component Value Date    PHOS 3.8 06/24/2020          Other Results:        Radiology:   US Abdomen Limited   Final Result      Dilated common duct         Electronically signed by: Avinash Del Toro MD   Date:    06/24/2020   Time:    14:15      X-Ray Chest AP Portable   Final Result      As above described.         Electronically signed by: Amber Read   Date:    06/24/2020   Time:    00:41      CT Pelvis Without Contrast   Final Result      1. There is an acute appearing vertical fracture on the left side of the sacrum.  There is an acute appearing fracture involving the anterior column of the left acetabulum. There is an acute appearing fracture through the midportion of the left inferior pubic ramus.  This is consistent with the patient's history.   2. There is a small amount of hyperdense fluid posterior to the rectum.  This is characteristic of hemorrhage.   3. There is a marked amount of atherosclerosis.   All CT scans at this facility use dose modulation, iterative reconstruction, and/or weight base dosing when appropriate to reduce radiation dose when appropriate to reduce radiation dose to as low as reasonably achievable.         Electronically signed by: Kenroy Young MD   Date:    06/23/2020   Time:    14:43      CT Lumbar Spine Without Contrast   Final Result      1. There is an acute appearing fracture in the superior endplate of L3. There is an acute appearing vertical fracture on the left side of the sacrum.  This is consistent with the patient's history.   2. There is grade 1 anterolisthesis of L3 on L4. There is grade 1 anterolisthesis of L4 on L5.   3. There is a minimal amount of levoconvex  curvature of the thoracolumbar spine.   4. The common bile duct is dilated.  It has a diameter of 13 mm.  If additional imaging evaluation is clinically indicated, I recommend consideration of an ERCP or MRCP.   5. There is a small diverticulum off of the posterior aspect of the 2nd portion of the duodenum.   6. There are calcifications scattered throughout the liver and spleen.  This is characteristic of a prior granulomatous infection.   7. There is a marked amount of atherosclerosis.   All CT scans at this facility use dose modulation, iterative reconstruction, and/or weight base dosing when appropriate to reduce radiation dose when appropriate to reduce radiation dose to as low as reasonably achievable.         Electronically signed by: Kenroy Young MD   Date:    06/23/2020   Time:    14:35          I have reviewed the above reports as well as previous records.      Current Medications:      Infusions:         Scheduled:    amiodarone  200 mg Oral Daily    amLODIPine  10 mg Oral Daily    aspirin  81 mg Oral Daily    calcium carbonate  500 mg Oral BID    cholecalciferol (vitamin D3)  400 Units Oral Daily    cilostazoL  100 mg Oral BID    furosemide  20 mg Oral Daily    metoprolol tartrate  50 mg Oral BID    nicotine  1 patch Transdermal Daily    omega-3 fatty acids-fish oil  1 capsule Oral Daily    rosuvastatin  10 mg Oral QHS         PRN:   albuterol-ipratropium, dextrose 50 % in water (D50W), dextrose 50 % in water (D50W), glucagon (human recombinant), glucose, glucose, insulin aspart U-100, morphine, oxyCODONE-acetaminophen, sodium chloride 0.9%         Assessment:      Yanni Car is a 79 y.o.female with significant pmhx of HTN and arthritis who is admitted with multiple fractures.  Also noted to have common bile duct dilatation.         Plan:      Acute left side sacral fracture and Acute fracture anterior Left acetabulum s/p mechanical fall   -noted on CT pelvis done in ED  -VSS, xrays done a  week ago negative for fracture   -consult placed to Orthopedic surgery: no surgical intervention at this time, partial weight-bearing on the left leg, can follow up with ortho as outpatient.   -PT/OT consulted recommend SNF vs IPR   -pt has been accepted to [facility], pt prefers [facility], social work calling to see if they are accepting pts at this time. If [facility] not accepting, pt agrees to go to [facility].  Likely discharge today.      Acute fracture of L3 s/p mechanical fall   -noted on CT lumbar spine  -VSS, xrays done a week ago negative for fracture   -consult ortho as above, PT/OT as above      Wheezing likely related to prolonged smoking history  -Duonebs q6  -F/u with PCP for possible COPD  -Supp oxygen as necessary     Common Bile Duct dilation   -incidentally found on CT lumber spine   -RUQ US: gallbladder is distended but otherwise unremarkable.  Common bile duct dilated to 1 cm Alk phos also elevated  -follow up with PCP      HTN  -/65  -resume home medications: Amlodipine 10 mg daily, lopressor 50 mg BID   -BP this /62     Diet:Cardiac  DVT ppx:SCD's  Dispo: medically ready for discharge to SNF        Felice Weiner MD  [facility] Internal [facility] HO-I  [facility] Internal Medicine Team A    [facility] Medicine Hospitalist Pager numbers:   [facility] Hospitalist Medicine Team A (Dom/Aric):          [phone]  [facility] Hospitalist Medicine Team B (Fredo/Yeyo):        [phone]

## 2020-06-25 NOTE — PLAN OF CARE
received call from Therese with [facility]- Authorization approved and sent to [facility].    Call placed to Vicky at [facility] and notified her of SNF approval.    Vicky reported she has 1:00PM appt with patient's  to complete admission paperwork.       06/25/20 1057   Post-Acute Status   Post-Acute Authorization Placement   Post-Acute Placement Status Authorization Obtained   Discharge Delays None known at this time   Discharge Plan   Discharge Plan A Skilled Nursing Facility

## 2020-06-25 NOTE — PLAN OF CARE
Problem: Adult Inpatient Plan of Care  Goal: chart Review  Outcome: Ongoing, Progressing   Patient sleeping when VN qued into room. Eyes closed. Respirations even and unlabored.  All safety measures maintained including bed locked, in lowest position with alarm on.  Call light within reach.

## 2020-06-25 NOTE — PLAN OF CARE
Patient was on room air when RT entered room.  SpO2 was 88.  Placed patient back on NC 2L, and SpO2 increased to 90.  No PRN treatment required at this time.  Will continue to monitor.

## 2020-06-26 NOTE — DISCHARGE SUMMARY
[facility] Internal Medicine Discharge Summary    Primary Team: [facility] Internal Medicine Team A  Attending Physician: Anamaria Corbett  Resident: Rudi  Intern: Zunilda    Date of Admit: 6/23/2020  Date of Discharge: 6/25/2020    Discharge to: [facility]  Condition: Good    Discharge Diagnoses     Patient Active Problem List   Diagnosis    Basal cell carcinoma of eyelid    Canthal dystopia    Ectropion of eyelid    Post-operative state    Mohs defect of eyelid    BCC (basal cell carcinoma), scalp/neck    PAD (peripheral artery disease)    Tobacco use    Hyperlipidemia    Paroxysmal A-fib    Bilateral carotid bruits    Long term current use of anticoagulant therapy    Fx sacrum/coccyx-closed    Fracture of third lumbar vertebra    Glaucoma, right eye    CKD (chronic kidney disease) stage 3, GFR 30-59 ml/min    Common bile duct dilatation    Acute fracture    Closed fracture of left inferior pubic ramus    Closed nondisplaced fracture of anterior wall of left acetabulum    Wheezing    Essential hypertension     Consultants and Procedures     Consultants:  Orthopedic Surgery  PT/OT    Procedures:   None    Brief History of Present Illness      Yanni Car is a 79 y.o. female who has a past medical history of Arthritis, Basal cell carcinoma, and Hypertension. The patient has had left back/hip pain x6 days s/p fall. Pt slipped and fell because her tennis shoes got caught on the floor, reported no head trauma, no LOC, no other injury. Was seen in [facility] ED at that time and discharged home with medications for pain. Pt stated that pain has continued and she cannot walk due to pain and weakness. She has a walker but does not use it.  No other associated symptoms. Pt denied numbness, difficulty urinating.  Symptoms are aggravated by turning and walking. Symptoms are relieved by rest.  Patient has no prior history of back problems. Significant smoking history. She lives with her  and was  previously independent.      While here, she was evaluated by Orthopedic Surgery who recommended no acute surgical intervention. PT/OT evaluated the patient and believed that she would benefit from IPR/SNF. Pain control was achieved with PO percocet PRN. The recommendation to be placed at a facility was discussed this with the patient and her and her  were in agreement. Decided on [facility] and she was discharged with a script for percocet and follow-up with Orthopedic Surgery after her SNF stay.    For the full HPI please refer to the History & Physical from this admission.    Hospital Course By Problem with Pertinent Findings     Acute Left-Sided Sacral Fracture and Acute Fracture Anterior Left acetabulum s/p mechanical fall   - noted on CT pelvis done in ED  - VSS, xrays done a week ago negative for fracture   - Orthopedic Surgery (Dr. Torres) evaluated: no surgical intervention at this time, partial weight-bearing on the left leg, can follow up with ortho as outpatient. Will have her follow-up in about 4 weeks  - PT and OT evaluated and recommended SNF vs IPR  - Discharged to [facility]  - Pain controlled with percocet and tylenol PRN, can de-escalate as needed.      Acute fracture of L3 s/p mechanical fall   -noted on CT lumbar spine  -VSS, xrays done a week ago negative for fracture   - Ortho and PT/OT recs as above     Wheezing likely related to prolonged smoking history, underlying lung disease  - Initially wheezing on exam, improved with duonebs  - SpO2 were appropriate, 90% at rest and 89% with activity  - Needs outpatient PFTs     Common Bile Duct dilation   -incidentally found on CT lumber spine   -RUQ US: gallbladder is distended but otherwise unremarkable. Common bile duct dilated to 1 cm, upper limit of normal.  -follow up with PCP      Essential Hypertension  - Normotensive  - Continue amlodipine 10mg daily, metoprolol 50mg BID, furosemide 20mg daily    PAD  - Continue cilostazol 100mg BID,  aspirin, omega3, rosuvastatin 10mg daily    Paroxysmal Atrial Fibrillation  - Continue amiodarone 200mg daily  - CHADSVASC 5  - Concerns for fall history and risk, defer anticoagulation until patient can be seen by PCP    Discharge Medications        Medication List      START taking these medications    oxyCODONE-acetaminophen 5-325 mg per tablet  Commonly known as: PERCOCET  Take 1 tablet by mouth every 8 (eight) hours as needed for Pain.        CONTINUE taking these medications    amiodarone 200 MG Tab  Commonly known as: PACERONE  TAKE 1 TABLET BY MOUTH EVERY DAY     amLODIPine 10 MG tablet  Commonly known as: NORVASC  Take 1 tablet (10 mg total) by mouth once daily.     aspirin 81 MG EC tablet  Commonly known as: ECOTRIN     CALCIUM 500 + D 500 mg(1,250mg) -200 unit per tablet  Generic drug: calcium-vitamin D3     cilostazoL 100 MG Tab  Commonly known as: PLETAL  TAKE 1 TABLET BY MOUTH TWO TIMES A DAY     fish oil-omega-3 fatty acids 300-1,000 mg capsule     furosemide 20 MG tablet  Commonly known as: LASIX  TAKE 1 TABLET BY MOUTH EVERY DAY     metoprolol tartrate 50 MG tablet  Commonly known as: LOPRESSOR  Take 1 tablet (50 mg total) by mouth 2 (two) times daily.     rosuvastatin 10 MG tablet  Commonly known as: CRESTOR  TAKE 1 TABLET BY MOUTH EVERY EVENING        STOP taking these medications    RESTASIS 0.05 % ophthalmic emulsion  Generic drug: cycloSPORINE           Where to Get Your Medications      These medications were sent to [id number] [address] [address]    Phone: [phone]   · furosemide 20 MG tablet     You can get these medications from any pharmacy    Bring a paper prescription for each of these medications  · oxyCODONE-acetaminophen 5-325 mg per tablet         Discharge Information:   Diet:  Cardiac low salt    Physical Activity:  As tolerated    Instructions:  1. Take all medications as prescribed  2. Keep all follow-up  appointments  3. Return to the hospital or call your primary care physicians if any worsening symptoms such as worsening pain in her hip and back that is uncontrolled, inability to walk or move here leg, fever, shortness of breath, bowel or bladder incontinence occur.    Follow-Up Appointments:  Orthopedic Surgery (Dr. Torres) - 4 weeks  PCP - after SNF stay     Julien Tineo MD  [facility] Internal Medicine, [facility]

## 2020-06-27 NOTE — PROGRESS NOTES
[facility]                                 Skilled Nursing Facility                   Progress Note     Admit Date:  06/25/2020  SHREE   Principal Problem:  Debility R/T recent Left-Sided Sacral Fracture and Acute Fracture Anterior Left acetabulum, fracture of L3 s/p mechanical fall  HPI obtained from patient interview and chart review     Visit Date: 6/30/2020    Chief Complaint: Establish Care/ Initial Visit s/p hospitalization for Left-Sided Sacral Fracture and Acute Fracture Anterior Left acetabulum, fracture of L3 s/p mechanical fall    HPI:   Ms. Car is a 79 y.o. female who has a past medical history of Arthritis, Basal cell carcinoma, and Hypertension.  Patient suffered a mechanical fall at home, and was found to have a Left-Sided Sacral Fracture and Acute Fracture Anterior Left acetabulum, fracture of L3.  Orthopedics recommended no surgical intervention but weight-bearing precautions and therapy.  She will follow-up with orthopedics on 07/09/2020 per the recommendations.      Patient will be treated at [facility] with PT and OT to improve functional status and ability to perform ADLs.     Past Medical History: Patient has a past medical history of Arthritis, Basal cell carcinoma, Hypertension, and PAF (paroxysmal atrial fibrillation) (8/17/2016).    Past Surgical History: Patient has a past surgical history that includes Hysterectomy and mohs repair.    Social History: Patient reports that she has been smoking cigarettes. She started smoking about 64 years ago. She has a 64.00 pack-year smoking history. She does not have any smokeless tobacco history on file. She reports that she does not drink alcohol.    Family History: family history includes Cancer in her father.    Allergies: Patient is allergic to penicillins.    ROS  Constitutional: Negative for fever or fatigue.   Eyes: Negative for blurred vision,  double vision and discharge.   Respiratory: Negative for cough, shortness of breath and wheezing.    Cardiovascular: Negative for chest pain, palpitations, and leg swelling.   Gastrointestinal: Negative for abdominal pain, constipation, diarrhea, nausea and vomiting.   Genitourinary: Negative for dysuria, frequency and urgency.   Musculoskeletal:  + generalized weakness. Negative for back pain and myalgias.   Skin: Negative for itching and rash.   Neurological: Negative for dizziness, speech change, and headaches.   Psychiatric/Behavioral: Negative for depression. The patient is not nervous/anxious.      PEx     Constitutional: Patient appears well-developed and in no distress   Head: Normocephalic and atraumatic.   Eyes: Pupils are equal, round, and reactive to light.   Neck: Normal range of motion. Neck supple.   Cardiovascular: Normal rate, regular rhythm and normal heart sounds.    Pulmonary/Chest: Effort normal and breath sounds are clear  Abdominal: Soft. Bowel sounds are normal.   Musculoskeletal: Normal range of motion.   Neurological: Alert and oriented to person, place, and time.   Psychiatric: Normal mood and affect. Behavior is normal.   Skin: Skin is warm and dry. Full skin assessment completed during visit, no concerns noted      Assessment and Plan:    Acute Left-Sided Sacral Fracture and Acute Fracture Anterior Left acetabulum, fracture of L3 s/p mechanical fall , ongoing  Vitamin-D deficiency, chronic, stable  - noted on CT pelvis done in ED> xrays done a week ago negative for fracture   - Orthopedic Surgery (Dr. Torres) evaluated: no surgical intervention at this time, partial weight-bearing on the left leg, can follow up with ortho as outpatient. Will have her follow-up in about 4 weeks  -continue calcium-vitamin D3 supplement b.i.d.  - 6/30 Pain control:  Refill of percocet p.r.n., MD changed to q.4 hours p.r.n. for better pain control    Debility R/T recent Left-Sided Sacral Fracture and Acute  Fracture Anterior Left acetabulum, fracture of L3 s/p mechanical fall, ongoing   - Continue with PT/OT for gait training and strengthening and restoration of ADL's   - Encourage mobility, OOB in chair, and early ambulation as appropriate  - Fall precautions   - Monitor for bowel and bladder dysfunction  - Monitor for and prevent skin breakdown and pressure ulcers     Recent history of Wheezing likely related to prolonged smoking history, underlying lung disease, intermittent  Ex-smoker, chronic  - Initially wheezing on exam, improved with duonebs  - SpO2 were appropriate, 90% at rest and 89% with activity  - Needs outpatient PFTs  -may need DuoNebs p.r.n. on skilled if wheezing recurs    Essential hypertension, chronic, stable  Paroxysmal Atrial Fibrillation, chronic, stable  - CHADSVASC 5>Concerns for fall history and risk, defer anticoagulation until patient can be seen by PCP  - Continue amiodarone 200mg daily, amlodipine 10mg daily, metoprolol 50mg BID, furosemide 20mg daily    PAD, chronic, stable  - Continue cilostazol 100mg BID, aspirin, omega3, rosuvastatin 10mg daily    Recent history of Common Bile Duct dilation, stable   -incidentally found on CT lumber spine   -RUQ US: gallbladder is distended but otherwise unremarkable. Common bile duct dilated to 1 cm, upper limit of normal.  -follow up with PCP       Future Appointments   Date Time Provider Department Center   7/9/2020 10:00 AM Charo Hoffman PA-C [facility] OR[facility] [place]       I certify that SNF services are required to be given on an inpatient basis because Yanni Car needs for skilled nursing care and/or skilled rehabilitation are required on a daily basis and such services can only practically be provided in a skilled nursing facility setting and are for an ongoing condition for which she received inpatient care in the hospital.     Total time of the visit 112 minutes  Non physical exam/ non charting time: 80 minutes   Start Time:1000  Stop  Time:1152  Description of non physical exam/non charting time: counseling patient on clinical conditions and therapies provided regarding management of chronic conditions, therapy plan of care.  Extensive chart review completed including all consultation notes.  All pertinent laboratory and radiographical images reviewed.        Geovanna Hardin NP          Patient note was created using MModal Dictation.  Any errors in syntax or even information may not have been identified and edited on initial review prior to signing this note.

## 2020-06-30 ENCOUNTER — DOCUMENTATION ONLY (OUTPATIENT)
Dept: HEPATOLOGY | Facility: HOSPITAL | Age: 80
End: 2020-06-30

## 2020-07-01 NOTE — PROGRESS NOTES
[facility]                                 Skilled Nursing Facility                   Progress Note     Admit Date:  06/25/2020  SHREE   Principal Problem:  Debility R/T recent Left-Sided Sacral Fracture and Acute Fracture Anterior Left acetabulum, fracture of L3 s/p mechanical fall  HPI obtained from patient interview and chart review     Visit Date: 7/2/2020    Chief Complaint:  BP/HR monitoring, PT/OT progression    HPI:   Ms. Car is a 79 y.o. female who has a past medical history of Arthritis, Basal cell carcinoma, and Hypertension.  Patient suffered a mechanical fall at home, and was found to have a Left-Sided Sacral Fracture and Acute Fracture Anterior Left acetabulum, fracture of L3.  Orthopedics recommended no surgical intervention but weight-bearing precautions and therapy.  She will follow-up with orthopedics on 07/09/2020 per the recommendations.      Patient will be treated at [facility] with PT and OT to improve functional status and ability to perform ADLs.     Interval history:  /64, HR 77, stable.  Patient progressing slowly with PT/OT.  Nursing reporting difficulty with weight-bearing precautions, discussed with patient again during visit today.  She verbalized understanding.  She reports her pain is much better controlled on q.4 hours p.r.n. narcotic.  No complaints are requested a during visit.  No concerns per nursing.  Will continue to monitor and treat of chronic conditions.    Past Medical History: Patient has a past medical history of Arthritis, Basal cell carcinoma, Hypertension, and PAF (paroxysmal atrial fibrillation) (8/17/2016).    Past Surgical History: Patient has a past surgical history that includes Hysterectomy and mohs repair.    Social History: Patient reports that she has been smoking cigarettes. She started smoking about 64 years ago. She has a 64.00 pack-year smoking history.  She does not have any smokeless tobacco history on file. She reports that she does not drink alcohol.    Family History: family history includes Cancer in her father.    Allergies: Patient is allergic to penicillins.    ROS  Constitutional: Negative for fever or fatigue.   Eyes: Negative for blurred vision, double vision and discharge.   Respiratory: Negative for cough, shortness of breath and wheezing.    Cardiovascular: Negative for chest pain, palpitations, and leg swelling.   Gastrointestinal: Negative for abdominal pain, constipation, diarrhea, nausea and vomiting.   Genitourinary: Negative for dysuria, frequency and urgency.   Musculoskeletal:  + generalized weakness. Negative for back pain and myalgias.   Skin: Negative for itching and rash.   Neurological: Negative for dizziness, speech change, and headaches.   Psychiatric/Behavioral: Negative for depression. The patient is not nervous/anxious.      PEx     Constitutional: Patient appears well-developed and in no distress   Head: Normocephalic and atraumatic.   Eyes: Pupils are equal, round, and reactive to light.   Neck: Normal range of motion. Neck supple.   Cardiovascular: Normal rate, regular rhythm and normal heart sounds.    Pulmonary/Chest: Effort normal and breath sounds are clear  Abdominal: Soft. Bowel sounds are normal.   Musculoskeletal: Normal range of motion.   Neurological: Alert and oriented to person, place, and time.   Psychiatric: Normal mood and affect. Behavior is normal.   Skin: Skin is warm and dry.      Assessment and Plan:    Acute Left-Sided Sacral Fracture and Acute Fracture Anterior Left acetabulum, fracture of L3 s/p mechanical fall , ongoing  Vitamin-D deficiency, chronic, stable  - noted on CT pelvis done in ED> xrays done a week ago negative for fracture   - Orthopedic Surgery (Dr. Torres) evaluated: no surgical intervention at this time, partial weight-bearing on the left leg, can follow up with ortho as outpatient. Will have  her follow-up in about 4 weeks  -continue calcium-vitamin D3 supplement b.i.d.  - 6/30 Pain control:  Refill of percocet p.r.n., MD changed to q.4 hours p.r.n. for better pain control  -7/2 continue current regimen    Debility R/T recent Left-Sided Sacral Fracture and Acute Fracture Anterior Left acetabulum, fracture of L3 s/p mechanical fall, ongoing   - Continue with PT/OT for gait training and strengthening and restoration of ADL's   - Encourage mobility, OOB in chair, and early ambulation as appropriate  - Fall precautions   - Monitor for bowel and bladder dysfunction  - Monitor for and prevent skin breakdown and pressure ulcers  -7/2 continue PT/OT    Essential hypertension, chronic, stable  Paroxysmal Atrial Fibrillation, chronic, stable  - CHADSVASC 5>Concerns for fall history and risk, defer anticoagulation until patient can be seen by PCP  - 7/2 Continue amiodarone 200mg daily, amlodipine 10mg daily, metoprolol 50mg BID, furosemide 20mg daily    Continued     Recent history of Wheezing likely related to prolonged smoking history, underlying lung disease, intermittent  Ex-smoker, chronic  - Initially wheezing on exam, improved with duonebs  - SpO2 were appropriate, 90% at rest and 89% with activity  - Needs outpatient PFTs  -may need DuoNebs p.r.n. on skilled if wheezing recurs    PAD, chronic, stable  - Continue cilostazol 100mg BID, aspirin, omega3, rosuvastatin 10mg daily    Recent history of Common Bile Duct dilation, stable   -incidentally found on CT lumber spine   -RUQ US: gallbladder is distended but otherwise unremarkable. Common bile duct dilated to 1 cm, upper limit of normal.  -follow up with PCP       Future Appointments   Date Time Provider Department Center   7/9/2020 10:00 AM Charo Hoffman PA-C [facility] JITENDRA Hardin NP          Patient note was created using MModal Dictation.  Any errors in syntax or even information may not have been identified and edited on  initial review prior to signing this note.

## 2020-07-02 ENCOUNTER — DOCUMENTATION ONLY (OUTPATIENT)
Dept: HEPATOLOGY | Facility: HOSPITAL | Age: 80
End: 2020-07-02

## 2020-07-08 ENCOUNTER — TELEPHONE (OUTPATIENT)
Dept: ORTHOPEDICS | Facility: CLINIC | Age: 80
End: 2020-07-08

## 2020-07-08 ENCOUNTER — DOCUMENTATION ONLY (OUTPATIENT)
Dept: PRIMARY CARE CLINIC | Facility: CLINIC | Age: 80
End: 2020-07-08

## 2020-07-08 VITALS
HEART RATE: 60 BPM | DIASTOLIC BLOOD PRESSURE: 51 MMHG | TEMPERATURE: 98 F | OXYGEN SATURATION: 95 % | RESPIRATION RATE: 18 BRPM | SYSTOLIC BLOOD PRESSURE: 121 MMHG

## 2020-07-08 DIAGNOSIS — S32.415A CLOSED NONDISPLACED FRACTURE OF ANTERIOR WALL OF LEFT ACETABULUM, INITIAL ENCOUNTER: Primary | ICD-10-CM

## 2020-07-08 NOTE — PROGRESS NOTES
[facility]   New Visit Progress Note   Transition of care  DOS 7/8/2020     PRESENTING HISTORY     Chief Complaint/Reason for Admission:  Evaluate for left-sided sacral fracture and  Acute Fracture Anterior Left acetabulum, fracture of L3 s/p mechanical fall,  progression in therapy, and chronic diseases    PCP: Rajni No MD   Date of Admit: 6/23/2020  Date of Discharge: 6/25/2020  Discharge to: [facility]    History of Present Illness:  Ms. Yanni Car is a 79 y.o. female who present to hospital after a mechanical fall at home, and was found to have a Left-Sided Sacral Fracture and Acute Fracture Anterior Left acetabulum, fracture of L3.  Orthopedics recommended no surgical intervention but weight-bearing precautions and therapy.       Patient transferred to [facility] for PT/OT on 6/25/2020    ___________________________________________________________________    Today:  I am seeing resident for the first time today. She is currently laying in bed with no acute distress. Denies pain, SOB, CP, fever, chills. Reports good appetite, regular BM and sleeps well. No complaints. LCTAB with no edema. Normal ROM to all extremities. VSS. Afebrile.   Per PT, resident is non-motivated and needs max encouragement for therapy. Resident states she has too much on her mind to focus on therapy. States she was ambulatory the day before but yesterday she just didn't feel like working with PT.   Reports living at home with  and was ambulatory. She is anxious to go home.       Past Medical History: Patient has a past medical history of Arthritis, Basal cell carcinoma, Hypertension, and PAF (paroxysmal atrial fibrillation) (8/17/2016).     Past Surgical History: Patient has a past surgical history that includes Hysterectomy and mohs repair.     Social History: Patient reports that she has been smoking cigarettes. She started smoking about 64 years ago. She has a 64.00 pack-year smoking history.  She does not have any smokeless tobacco history on file. She reports that she does not drink alcohol.     Family History: family history includes Cancer in her father.    Review of Systems  Constitutional: Negative for fever or fatigue.   Eyes: Negative for blurred vision, double vision and discharge.   Respiratory: Negative for cough, shortness of breath and wheezing.    Cardiovascular: Negative for chest pain, palpitations, and leg swelling.   Gastrointestinal: Negative for abdominal pain, constipation, diarrhea, nausea and vomiting.   Genitourinary: Negative for dysuria, frequency and urgency.   Musculoskeletal:  + generalized weakness. Negative for pain  Skin: Negative for itching and rash.   Neurological: Negative for dizziness, speech change, and headaches.   Psychiatric/Behavioral: Negative for depression. The patient is not nervous/anxious.          PAST HISTORY:     Past Medical History:   Diagnosis Date    Arthritis     Basal cell carcinoma     Hypertension     PAF (paroxysmal atrial fibrillation) 8/17/2016    In [facility] today-on amiodarone [id number] 4 with 4% risk of yearly CVA  Afib was related to SIRS in 1/2012 She was admitted for pneumonia   1/2019:   Normal EF  Normal diastolic parameters  No valvular disease             Past Surgical History:   Procedure Laterality Date    HYSTERECTOMY      mohs repair         Family History   Problem Relation Age of Onset    Cancer Father     Amblyopia Neg Hx     Blindness Neg Hx     Cataracts Neg Hx     Diabetes Neg Hx     Glaucoma Neg Hx     Hypertension Neg Hx     Macular degeneration Neg Hx     Retinal detachment Neg Hx     Strabismus Neg Hx     Stroke Neg Hx     Thyroid disease Neg Hx          MEDICATIONS & ALLERGIES:     Current Outpatient Medications on File Prior to Visit   Medication Sig Dispense Refill    amiodarone (PACERONE) 200 MG Tab TAKE 1 TABLET BY MOUTH EVERY DAY 90 tablet 3    amLODIPine (NORVASC) 10 MG tablet Take 1 tablet (10  mg total) by mouth once daily. 90 tablet 3    aspirin (ECOTRIN) 81 MG EC tablet Take 81 mg by mouth once daily.        calcium-vitamin D3 (CALCIUM 500 + D) 500 mg(1,250mg) -200 unit per tablet Take 1 tablet by mouth 2 (two) times daily with meals.      cilostazoL (PLETAL) 100 MG Tab TAKE 1 TABLET BY MOUTH TWO TIMES A  tablet 3    fish oil-omega-3 fatty acids 300-1,000 mg capsule Take 2 g by mouth once daily.        furosemide (LASIX) 20 MG tablet TAKE 1 TABLET BY MOUTH EVERY DAY 90 tablet 3    metoprolol tartrate (LOPRESSOR) 50 MG tablet Take 1 tablet (50 mg total) by mouth 2 (two) times daily. 180 tablet 3    rosuvastatin (CRESTOR) 10 MG tablet TAKE 1 TABLET BY MOUTH EVERY EVENING 90 tablet 3     No current facility-administered medications on file prior to visit.         Review of patient's allergies indicates:   Allergen Reactions    Penicillins Swelling       OBJECTIVE:     Vital Signs:  BP (!) 121/51   Pulse 60   Temp 98 °F (36.7 °C)   Resp 18   SpO2 95%   Wt Readings from Last 1 Encounters:   06/25/20 0511 88 kg (194 lb 0.1 oz)   06/23/20 2224 83.6 kg (184 lb 4.9 oz)   06/23/20 1256 83.9 kg (185 lb)     There is no height or weight on file to calculate BMI.        Physical Exam:  BP (!) 121/51   Pulse 60   Temp 98 °F (36.7 °C)   Resp 18   SpO2 95%   Constitutional: Patient appears well-developed and in no distress   Head: Normocephalic and atraumatic.   Eyes: Pupils are equal, round, and reactive to light.   Neck: Normal range of motion. Neck supple.   Cardiovascular: Normal rate, regular rhythm and normal heart sounds.    Pulmonary/Chest: Effort normal and breath sounds are clear  Abdominal: Soft. Bowel sounds are normal.   Musculoskeletal: Normal range of motion.   Neurological: Alert and oriented to person, place, and time.   Psychiatric: Normal mood and affect. Behavior is normal.   Skin: Skin is warm and dry.    Laboratory  Lab Results   Component Value Date    WBC 6.01 06/25/2020     HGB 12.9 06/25/2020    HCT 38.8 06/25/2020    MCV 89 06/25/2020     (H) 06/25/2020     BMP  Lab Results   Component Value Date     06/25/2020    K 3.8 06/25/2020     06/25/2020    CO2 26 06/25/2020    BUN 24 (H) 06/25/2020    CREATININE 1.2 06/25/2020    CALCIUM 9.2 06/25/2020    ANIONGAP 8 06/25/2020    ESTGFRAFRICA 50 (A) 06/25/2020    EGFRNONAA 43 (A) 06/25/2020     Lab Results   Component Value Date    ALT 29 06/25/2020    AST 24 06/25/2020    ALKPHOS 224 (H) 06/25/2020    BILITOT 0.4 06/25/2020     Lab Results   Component Value Date    INR 2.8 (H) 08/01/2018    INR 2.9 (H) 07/11/2018    INR 1.8 (H) 06/27/2018     Lab Results   Component Value Date    HGBA1C 6.1 (H) 06/23/2020         ASSESSMENT & PLAN:     Acute Left-Sided Sacral Fracture and Acute Fracture Anterior Left acetabulum, fracture of L3 s/p mechanical fall , ongoing  Vitamin-D deficiency, chronic, stable  - Orthopedic Surgery (Dr. Torres) evaluated: no surgical intervention at this time, partial weight-bearing on the left leg, can follow up with ortho as outpatient.   -continue calcium-vitamin D3 supplement b.i.d.  -7/8 stable. No pain. Appt with Dr. Torres's PA with xray tomorrow on 7/9     Debility R/T recent Left-Sided Sacral Fracture and Acute Fracture Anterior Left acetabulum, fracture of L3 s/p mechanical fall, ongoing   - Continue with PT/OT for gait training and strengthening and restoration of ADL's   - needs max encouragement with therapy       Essential hypertension, chronic, stable  Paroxysmal Atrial Fibrillation, chronic, stable  - CHADSVASC 5>Concerns for fall history and risk, defer anticoagulation until patient can be seen by PCP  - Continue amiodarone 200mg daily, amlodipine 10mg daily, metoprolol 50mg BID, furosemide 20mg daily        PAD, chronic, stable  - Continue cilostazol 100mg BID, aspirin, omega3, rosuvastatin 10mg daily     Recent history of Common Bile Duct dilation, stable   -incidentally found on CT  lumber spine   -RUQ US: gallbladder is distended but otherwise unremarkable. Common bile duct dilated to 1 cm, upper limit of normal.  -follow up with PCP         Instructions for the patient:      Scheduled Follow-up :  Future Appointments   Date Time Provider Department Center   7/9/2020  9:45 AM [facility] ORTHO CLINIC LIMIT 350LBS [facility] ORX[facility]   7/9/2020 10:00 AM Charo Hoffman PA-C [facility] ORTHO [facility]       Post Visit Medication List:     Medication List          Accurate as of July 8, 2020 12:48 PM. If you have any questions, ask your nurse or doctor.            CONTINUE taking these medications    amiodarone 200 MG Tab  Commonly known as: PACERONE  TAKE 1 TABLET BY MOUTH EVERY DAY     amLODIPine 10 MG tablet  Commonly known as: NORVASC  Take 1 tablet (10 mg total) by mouth once daily.     aspirin 81 MG EC tablet  Commonly known as: ECOTRIN     CALCIUM 500 + D 500 mg(1,250mg) -200 unit per tablet  Generic drug: calcium-vitamin D3     cilostazoL 100 MG Tab  Commonly known as: PLETAL  TAKE 1 TABLET BY MOUTH TWO TIMES A DAY     fish oil-omega-3 fatty acids 300-1,000 mg capsule     furosemide 20 MG tablet  Commonly known as: LASIX  TAKE 1 TABLET BY MOUTH EVERY DAY     metoprolol tartrate 50 MG tablet  Commonly known as: LOPRESSOR  Take 1 tablet (50 mg total) by mouth 2 (two) times daily.     rosuvastatin 10 MG tablet  Commonly known as: CRESTOR  TAKE 1 TABLET BY MOUTH EVERY EVENING          Total time of the visit 40 min 11:35 am - 12:15 pm   Non physical exam/ non charting time: 25 minutes   Description of non physical exam/non charting time: Extensive chart review completed including all consultation notes.     Signing Physician:  Christal Hilliard NP       Due to unresolved technical issue with EMR, Medication list on this note summary may not be accurate.

## 2020-07-08 NOTE — TELEPHONE ENCOUNTER
----- Message from Charo Hoffman PA-C sent at 7/8/2020 10:37 AM CDT -----  Order pelvis xray  ----- Message -----  From: Yocasta Wilson LPN  Sent: 7/8/2020   9:06 AM CDT  To: Charo Hoffman PA-C    Can you let me know if you need additional x-rays ordered on this patient? I put in miracle. Hips but when I go to order lumbar it asks me all these questions and I'm not sure. Thanks!

## 2020-07-10 ENCOUNTER — DOCUMENTATION ONLY (OUTPATIENT)
Dept: PRIMARY CARE CLINIC | Facility: CLINIC | Age: 80
End: 2020-07-10

## 2020-07-12 NOTE — PROGRESS NOTES
[facility]   Re-evaluate Visit  DOS 7/10/2020     PRESENTING HISTORY     Chief Complaint/Reason for Admission:  Evaluate progression in therapy, Left-Sided Sacral Fracture,Acute Fracture Anterior Left acetabulum, fracture of L3, and chronic disease    PCP: Rajni No MD   Date of Admit: 6/23/2020  Date of Discharge: 6/25/2020  Discharge to: [facility]    History of Present Illness:  Ms. Yanni Car is a 79 y.o. female who present to hospital after a mechanical fall at home, and was found to have a Left-Sided Sacral Fracture and Acute Fracture Anterior Left acetabulum, fracture of L3.  Orthopedics recommended no surgical intervention but weight-bearing precautions and therapy.       Patient transferred to [facility] for PT/OT on 6/25/2020    _________________________________________________________    Today:  The resident is currently laying in bed with no acute distress. Alert and awake. Pleasant with no complaints.  Denies pain.  LCTAB with no edema. Normal ROM to all extremities.   Per PT, resident is progressing well with therapy.   No report of acute changes.     Past Medical History: Patient has a past medical history of Arthritis, Basal cell carcinoma, Hypertension, and PAF (paroxysmal atrial fibrillation) (8/17/2016).     Past Surgical History: Patient has a past surgical history that includes Hysterectomy and mohs repair.     Social History: Patient reports that she has been smoking cigarettes. She started smoking about 64 years ago. She has a 64.00 pack-year smoking history. She does not have any smokeless tobacco history on file. She reports that she does not drink alcohol.     Family History: family history includes Cancer in her father.    Review of Systems  Constitutional: Negative for fever or fatigue.   Eyes: Negative for blurred vision, double vision and discharge.   Respiratory: Negative for cough, shortness of breath and wheezing.    Cardiovascular: Negative for chest  pain, palpitations, and leg swelling.   Gastrointestinal: Negative for abdominal pain, constipation, diarrhea, nausea and vomiting.   Genitourinary: Negative for dysuria, frequency and urgency.   Musculoskeletal:  + generalized weakness. Negative for pain  Skin: Negative for itching and rash.   Neurological: Negative for dizziness, speech change, and headaches.   Psychiatric/Behavioral: Negative for depression. The patient is not nervous/anxious.          PAST HISTORY:     Past Medical History:   Diagnosis Date    Arthritis     Basal cell carcinoma     Hypertension     PAF (paroxysmal atrial fibrillation) 8/17/2016    In [facility] today-on amiodarone [id number] 4 with 4% risk of yearly CVA  Afib was related to SIRS in 1/2012 She was admitted for pneumonia   1/2019:   Normal EF  Normal diastolic parameters  No valvular disease             Past Surgical History:   Procedure Laterality Date    HYSTERECTOMY      mohs repair         Family History   Problem Relation Age of Onset    Cancer Father     Amblyopia Neg Hx     Blindness Neg Hx     Cataracts Neg Hx     Diabetes Neg Hx     Glaucoma Neg Hx     Hypertension Neg Hx     Macular degeneration Neg Hx     Retinal detachment Neg Hx     Strabismus Neg Hx     Stroke Neg Hx     Thyroid disease Neg Hx          MEDICATIONS & ALLERGIES:     Current Outpatient Medications on File Prior to Visit   Medication Sig Dispense Refill    amiodarone (PACERONE) 200 MG Tab TAKE 1 TABLET BY MOUTH EVERY DAY 90 tablet 3    amLODIPine (NORVASC) 10 MG tablet Take 1 tablet (10 mg total) by mouth once daily. 90 tablet 3    aspirin (ECOTRIN) 81 MG EC tablet Take 81 mg by mouth once daily.        calcium-vitamin D3 (CALCIUM 500 + D) 500 mg(1,250mg) -200 unit per tablet Take 1 tablet by mouth 2 (two) times daily with meals.      cilostazoL (PLETAL) 100 MG Tab TAKE 1 TABLET BY MOUTH TWO TIMES A  tablet 3    fish oil-omega-3 fatty acids 300-1,000 mg capsule Take 2 g by mouth  once daily.        furosemide (LASIX) 20 MG tablet TAKE 1 TABLET BY MOUTH EVERY DAY 90 tablet 3    metoprolol tartrate (LOPRESSOR) 50 MG tablet Take 1 tablet (50 mg total) by mouth 2 (two) times daily. 180 tablet 3    rosuvastatin (CRESTOR) 10 MG tablet TAKE 1 TABLET BY MOUTH EVERY EVENING 90 tablet 3     No current facility-administered medications on file prior to visit.         Review of patient's allergies indicates:   Allergen Reactions    Penicillins Swelling       OBJECTIVE:     Vital Signs:  Wt Readings from Last 1 Encounters:   06/25/20 0511 88 kg (194 lb 0.1 oz)   06/23/20 2224 83.6 kg (184 lb 4.9 oz)   06/23/20 1256 83.9 kg (185 lb)     There is no height or weight on file to calculate BMI.        Physical Exam:  Constitutional: Patient appears well-developed and in no distress   Head: Normocephalic and atraumatic.   Eyes: Pupils are equal, round, and reactive to light.   Neck: Normal range of motion. Neck supple.   Cardiovascular: Normal rate, regular rhythm and normal heart sounds.  Pulmonary/Chest: Effort normal and breath sounds are clear  Abdominal: Soft. Bowel sounds are normal.   Musculoskeletal: Normal range of motion.   Neurological: Alert and oriented to person, place, and time.   Psychiatric: Normal mood and affect. Behavior is normal.   Skin: Skin is warm and dry.    Laboratory  Lab Results   Component Value Date    WBC 6.01 06/25/2020    HGB 12.9 06/25/2020    HCT 38.8 06/25/2020    MCV 89 06/25/2020     (H) 06/25/2020     BMP  Lab Results   Component Value Date     06/25/2020    K 3.8 06/25/2020     06/25/2020    CO2 26 06/25/2020    BUN 24 (H) 06/25/2020    CREATININE 1.2 06/25/2020    CALCIUM 9.2 06/25/2020    ANIONGAP 8 06/25/2020    ESTGFRAFRICA 50 (A) 06/25/2020    EGFRNONAA 43 (A) 06/25/2020     Lab Results   Component Value Date    ALT 29 06/25/2020    AST 24 06/25/2020    ALKPHOS 224 (H) 06/25/2020    BILITOT 0.4 06/25/2020     Lab Results   Component Value  Date    INR 2.8 (H) 08/01/2018    INR 2.9 (H) 07/11/2018    INR 1.8 (H) 06/27/2018     Lab Results   Component Value Date    HGBA1C 6.1 (H) 06/23/2020         ASSESSMENT & PLAN:     Acute Left-Sided Sacral Fracture and Acute Fracture Anterior Left acetabulum, fracture of L3 s/p mechanical fall , ongoing  Vitamin-D deficiency, chronic, stable  - Orthopedic Surgery (Dr. Torres) evaluated: no surgical intervention at this time, partial weight-bearing on the left leg, can follow up with ortho as outpatient.   -continue calcium-vitamin D3 supplement b.i.d.  -7/10 stable. No pain.      Debility R/T recent Left-Sided Sacral Fracture and Acute Fracture Anterior Left acetabulum, fracture of L3 s/p mechanical fall, ongoing   - Continue with PT/OT for gait training and strengthening and restoration of ADL's        Essential hypertension, chronic, stable  Paroxysmal Atrial Fibrillation, chronic, stable  - CHADSVASC 5>Concerns for fall history and risk, defer anticoagulation until patient can be seen by PCP  - Continue amiodarone 200mg daily, amlodipine 10mg daily, metoprolol 50mg BID, furosemide 20mg daily        PAD, chronic, stable  - Continue cilostazol 100mg BID, aspirin, omega3, rosuvastatin 10mg daily         Instructions for the patient:      Scheduled Follow-up :  No future appointments.    Post Visit Medication List:     Medication List          Accurate as of July 10, 2020 11:59 PM. If you have any questions, ask your nurse or doctor.            CONTINUE taking these medications    amiodarone 200 MG Tab  Commonly known as: PACERONE  TAKE 1 TABLET BY MOUTH EVERY DAY     amLODIPine 10 MG tablet  Commonly known as: NORVASC  Take 1 tablet (10 mg total) by mouth once daily.     aspirin 81 MG EC tablet  Commonly known as: ECOTRIN     CALCIUM 500 + D 500 mg(1,250mg) -200 unit per tablet  Generic drug: calcium-vitamin D3     cilostazoL 100 MG Tab  Commonly known as: PLETAL  TAKE 1 TABLET BY MOUTH TWO TIMES A DAY     fish  oil-omega-3 fatty acids 300-1,000 mg capsule     furosemide 20 MG tablet  Commonly known as: LASIX  TAKE 1 TABLET BY MOUTH EVERY DAY     metoprolol tartrate 50 MG tablet  Commonly known as: LOPRESSOR  Take 1 tablet (50 mg total) by mouth 2 (two) times daily.     rosuvastatin 10 MG tablet  Commonly known as: CRESTOR  TAKE 1 TABLET BY MOUTH EVERY EVENING              Signing Physician:  Christal Hilliard NP       Due to unresolved technical issue with EMR, Medication list on this note summary may not be accurate.

## 2020-07-15 VITALS
RESPIRATION RATE: 20 BRPM | OXYGEN SATURATION: 98 % | SYSTOLIC BLOOD PRESSURE: 124 MMHG | HEART RATE: 60 BPM | DIASTOLIC BLOOD PRESSURE: 63 MMHG | TEMPERATURE: 97 F

## 2020-07-15 NOTE — PROGRESS NOTES
[facility]   Re-evaluate Visit  DOS 7/15/2020     PRESENTING HISTORY     Chief Complaint/Reason for Admission:  Evaluate progression in therapy, Left-Sided Sacral Fracture,Acute Fracture Anterior Left acetabulum, fracture of L3, and left middle finger abscess    PCP: Rajni No MD   Date of Admit: 6/23/2020  Date of Discharge: 6/25/2020  Discharge to: [facility]    History of Present Illness:  Ms. Yanni Car is a 79 y.o. female who present to hospital after a mechanical fall at home, and was found to have a Left-Sided Sacral Fracture and Acute Fracture Anterior Left acetabulum, fracture of L3.  Orthopedics recommended no surgical intervention but weight-bearing precautions and therapy.       Patient transferred to [facility] for PT/OT on 6/25/2020    _________________________________________________________    Today:  The resident is currently laying in bed with no acute distress. Resident reports doing well. No compliants and no report of acute changes. Denies pain, constipation, SOB, cough, CP, fever or chills.  LCTAB with no edema. Normal ROM to all extremities.   Per PT, resident is progressing well with therapy.   Ambulates approximately 50 feet with rolling walker. Resident gets tired easily with short distance. She was recently treated for left middle finger abscess with Bactrim DS. Completed abx and left middle finger stable with no signs of infection. No redness, swelling or drainage noted. Resident denies pain to it.     Past Medical History: Patient has a past medical history of Arthritis, Basal cell carcinoma, Hypertension, and PAF (paroxysmal atrial fibrillation) (8/17/2016).     Past Surgical History: Patient has a past surgical history that includes Hysterectomy and mohs repair.     Social History: Patient reports that she has been smoking cigarettes. She started smoking about 64 years ago. She has a 64.00 pack-year smoking history. She does not have any smokeless  tobacco history on file. She reports that she does not drink alcohol.     Family History: family history includes Cancer in her father.    Review of Systems  Constitutional: Negative for fever or fatigue.   Eyes: Negative for blurred vision, double vision and discharge.   Respiratory: Negative for cough, shortness of breath and wheezing.    Cardiovascular: Negative for chest pain, palpitations, and leg swelling.   Gastrointestinal: Negative for abdominal pain, constipation, diarrhea, nausea and vomiting.   Genitourinary: Negative for dysuria, frequency and urgency.   Musculoskeletal:  + generalized weakness. Negative for pain  Skin: + left middle finger abscess that is healing well  Neurological: Negative for dizziness, speech change, and headaches.   Psychiatric/Behavioral: Negative for depression. The patient is not nervous/anxious.          PAST HISTORY:     Past Medical History:   Diagnosis Date    Arthritis     Basal cell carcinoma     Hypertension     PAF (paroxysmal atrial fibrillation) 8/17/2016    In [facility] today-on amiodarone [id number] 4 with 4% risk of yearly CVA  Afib was related to SIRS in 1/2012 She was admitted for pneumonia   1/2019:   Normal EF  Normal diastolic parameters  No valvular disease             Past Surgical History:   Procedure Laterality Date    HYSTERECTOMY      mohs repair         Family History   Problem Relation Age of Onset    Cancer Father     Amblyopia Neg Hx     Blindness Neg Hx     Cataracts Neg Hx     Diabetes Neg Hx     Glaucoma Neg Hx     Hypertension Neg Hx     Macular degeneration Neg Hx     Retinal detachment Neg Hx     Strabismus Neg Hx     Stroke Neg Hx     Thyroid disease Neg Hx          MEDICATIONS & ALLERGIES:     Current Outpatient Medications on File Prior to Visit   Medication Sig Dispense Refill    amiodarone (PACERONE) 200 MG Tab TAKE 1 TABLET BY MOUTH EVERY DAY 90 tablet 3    amLODIPine (NORVASC) 10 MG tablet Take 1 tablet (10 mg total) by  mouth once daily. 90 tablet 3    aspirin (ECOTRIN) 81 MG EC tablet Take 81 mg by mouth once daily.        calcium-vitamin D3 (CALCIUM 500 + D) 500 mg(1,250mg) -200 unit per tablet Take 1 tablet by mouth 2 (two) times daily with meals.      cilostazoL (PLETAL) 100 MG Tab TAKE 1 TABLET BY MOUTH TWO TIMES A  tablet 3    fish oil-omega-3 fatty acids 300-1,000 mg capsule Take 2 g by mouth once daily.        furosemide (LASIX) 20 MG tablet TAKE 1 TABLET BY MOUTH EVERY DAY 90 tablet 3    metoprolol tartrate (LOPRESSOR) 50 MG tablet Take 1 tablet (50 mg total) by mouth 2 (two) times daily. 180 tablet 3    rosuvastatin (CRESTOR) 10 MG tablet TAKE 1 TABLET BY MOUTH EVERY EVENING 90 tablet 3     No current facility-administered medications on file prior to visit.         Review of patient's allergies indicates:   Allergen Reactions    Penicillins Swelling       OBJECTIVE:     Vital Signs:  Vitals:    07/15/20 1230   BP: 124/63   Pulse: 60   Resp: 20   Temp: 97.3 °F (36.3 °C)   SpO2: 98%     Wt Readings from Last 1 Encounters:   06/25/20 0511 88 kg (194 lb 0.1 oz)   06/23/20 2224 83.6 kg (184 lb 4.9 oz)   06/23/20 1256 83.9 kg (185 lb)     There is no height or weight on file to calculate BMI.        Physical Exam:  Constitutional: Patient appears well-developed and in no distress   Head: Normocephalic and atraumatic.   Eyes: Pupils are equal, round, and reactive to light.   Neck: Normal range of motion. Neck supple.   Cardiovascular: Normal rate, regular rhythm and normal heart sounds.  Pulmonary/Chest: Effort normal and breath sounds are clear  Abdominal: Soft. Bowel sounds are normal.   Musculoskeletal: Normal range of motion.   Neurological: Alert and oriented to person, place, and time.   Psychiatric: Normal mood and affect. Behavior is normal.   Skin: Skin is warm and dry.+ left middle finger with no swelling, redness or drainage noted.     Laboratory  Lab Results   Component Value Date    WBC 6.01  06/25/2020    HGB 12.9 06/25/2020    HCT 38.8 06/25/2020    MCV 89 06/25/2020     (H) 06/25/2020     BMP  Lab Results   Component Value Date     06/25/2020    K 3.8 06/25/2020     06/25/2020    CO2 26 06/25/2020    BUN 24 (H) 06/25/2020    CREATININE 1.2 06/25/2020    CALCIUM 9.2 06/25/2020    ANIONGAP 8 06/25/2020    ESTGFRAFRICA 50 (A) 06/25/2020    EGFRNONAA 43 (A) 06/25/2020     Lab Results   Component Value Date    ALT 29 06/25/2020    AST 24 06/25/2020    ALKPHOS 224 (H) 06/25/2020    BILITOT 0.4 06/25/2020     Lab Results   Component Value Date    INR 2.8 (H) 08/01/2018    INR 2.9 (H) 07/11/2018    INR 1.8 (H) 06/27/2018     Lab Results   Component Value Date    HGBA1C 6.1 (H) 06/23/2020         ASSESSMENT & PLAN:     Acute Left-Sided Sacral Fracture and Acute Fracture Anterior Left acetabulum, fracture of L3 s/p mechanical fall , ongoing  Vitamin-D deficiency, chronic, stable  - Orthopedic Surgery (Dr. Torres) evaluated: no surgical intervention at this time, partial weight-bearing on the left leg, can follow up with ortho as outpatient.   -continue calcium-vitamin D3 supplement b.i.d.  -7/15 stable. No pain.      Debility R/T recent Left-Sided Sacral Fracture and Acute Fracture Anterior Left acetabulum, fracture of L3 s/p mechanical fall, ongoing   - Continue with PT/OT for gait training and strengthening and restoration of ADL's     Left middle finger abscess  - completed Bactrim DS per Dr. Bruce. Healing well with no signs of infection     Instructions for the patient:      Scheduled Follow-up :  No future appointments.    Post Visit Medication List:     Medication List          Accurate as of July 15, 2020 12:38 PM. If you have any questions, ask your nurse or doctor.            CONTINUE taking these medications    amiodarone 200 MG Tab  Commonly known as: PACERONE  TAKE 1 TABLET BY MOUTH EVERY DAY     amLODIPine 10 MG tablet  Commonly known as: NORVASC  Take 1 tablet (10 mg total) by  mouth once daily.     aspirin 81 MG EC tablet  Commonly known as: ECOTRIN     CALCIUM 500 + D 500 mg(1,250mg) -200 unit per tablet  Generic drug: calcium-vitamin D3     cilostazoL 100 MG Tab  Commonly known as: PLETAL  TAKE 1 TABLET BY MOUTH TWO TIMES A DAY     fish oil-omega-3 fatty acids 300-1,000 mg capsule     furosemide 20 MG tablet  Commonly known as: LASIX  TAKE 1 TABLET BY MOUTH EVERY DAY     metoprolol tartrate 50 MG tablet  Commonly known as: LOPRESSOR  Take 1 tablet (50 mg total) by mouth 2 (two) times daily.     rosuvastatin 10 MG tablet  Commonly known as: CRESTOR  TAKE 1 TABLET BY MOUTH EVERY EVENING              Signing Physician:  Christal Hilliard NP       Due to unresolved technical issue with EMR, Medication list on this note summary may not be accurate.

## 2020-07-17 ENCOUNTER — DOCUMENTATION ONLY (OUTPATIENT)
Dept: PRIMARY CARE CLINIC | Facility: CLINIC | Age: 80
End: 2020-07-17

## 2020-07-20 NOTE — PROGRESS NOTES
[facility]   Re-evaluate Visit  DOS 7/17/2020     PRESENTING HISTORY     Chief Complaint/Reason for Admission:  Evaluate progression in therapy and chronic diseases    PCP: Rajni No MD   Date of Admit: 6/23/2020  Date of Discharge: 6/25/2020  Discharge to: [facility]    History of Present Illness:  Ms. Yanni Car is a 79 y.o. female who present to hospital after a mechanical fall at home, and was found to have a Left-Sided Sacral Fracture and Acute Fracture Anterior Left acetabulum, fracture of L3.  Orthopedics recommended no surgical intervention but weight-bearing precautions and therapy.       Patient transferred to [facility] for PT/OT on 6/25/2020    _________________________________________________________    Today:  The resident is currently laying in bed with no acute distress. Pleasant and states she is doing good. Denies pain, SOB, CP, fever and chills. Progressing well with therapy. Ambulates 75 feet with r/w. LCTAB with no edema. VSS. Afebrile. No report of acute changes.       Past Medical History: Patient has a past medical history of Arthritis, Basal cell carcinoma, Hypertension, and PAF (paroxysmal atrial fibrillation) (8/17/2016).     Past Surgical History: Patient has a past surgical history that includes Hysterectomy and mohs repair.     Social History: Patient reports that she has been smoking cigarettes. She started smoking about 64 years ago. She has a 64.00 pack-year smoking history. She does not have any smokeless tobacco history on file. She reports that she does not drink alcohol.     Family History: family history includes Cancer in her father.    Review of Systems  Constitutional: Negative for fever or fatigue.   Eyes: Negative for blurred vision, double vision and discharge.   Respiratory: Negative for cough, shortness of breath and wheezing.    Cardiovascular: Negative for chest pain, palpitations, and leg swelling.   Gastrointestinal: Negative for  abdominal pain, constipation, diarrhea, nausea and vomiting.   Genitourinary: Negative for dysuria, frequency and urgency.   Musculoskeletal:  + generalized weakness. Negative for pain  Skin: + left middle finger abscess that is healing well  Neurological: Negative for dizziness, speech change, and headaches.   Psychiatric/Behavioral: Negative for depression. The patient is not nervous/anxious.          PAST HISTORY:     Past Medical History:   Diagnosis Date    Arthritis     Basal cell carcinoma     Hypertension     PAF (paroxysmal atrial fibrillation) 8/17/2016    In NSR today-on amiodarone [id number] 4 with 4% risk of yearly CVA  Afib was related to SIRS in 1/2012 She was admitted for pneumonia   1/2019:   Normal EF  Normal diastolic parameters  No valvular disease             Past Surgical History:   Procedure Laterality Date    HYSTERECTOMY      mohs repair         Family History   Problem Relation Age of Onset    Cancer Father     Amblyopia Neg Hx     Blindness Neg Hx     Cataracts Neg Hx     Diabetes Neg Hx     Glaucoma Neg Hx     Hypertension Neg Hx     Macular degeneration Neg Hx     Retinal detachment Neg Hx     Strabismus Neg Hx     Stroke Neg Hx     Thyroid disease Neg Hx          MEDICATIONS & ALLERGIES:     Current Outpatient Medications on File Prior to Visit   Medication Sig Dispense Refill    amiodarone (PACERONE) 200 MG Tab TAKE 1 TABLET BY MOUTH EVERY DAY 90 tablet 3    amLODIPine (NORVASC) 10 MG tablet Take 1 tablet (10 mg total) by mouth once daily. 90 tablet 3    aspirin (ECOTRIN) 81 MG EC tablet Take 81 mg by mouth once daily.        calcium-vitamin D3 (CALCIUM 500 + D) 500 mg(1,250mg) -200 unit per tablet Take 1 tablet by mouth 2 (two) times daily with meals.      cilostazoL (PLETAL) 100 MG Tab TAKE 1 TABLET BY MOUTH TWO TIMES A  tablet 3    fish oil-omega-3 fatty acids 300-1,000 mg capsule Take 2 g by mouth once daily.        furosemide (LASIX) 20 MG tablet  TAKE 1 TABLET BY MOUTH EVERY DAY 90 tablet 3    metoprolol tartrate (LOPRESSOR) 50 MG tablet Take 1 tablet (50 mg total) by mouth 2 (two) times daily. 180 tablet 3    rosuvastatin (CRESTOR) 10 MG tablet TAKE 1 TABLET BY MOUTH EVERY EVENING 90 tablet 3     No current facility-administered medications on file prior to visit.         Review of patient's allergies indicates:   Allergen Reactions    Penicillins Swelling       OBJECTIVE:     Vital Signs:  Wt Readings from Last 1 Encounters:   06/25/20 0511 88 kg (194 lb 0.1 oz)   06/23/20 2224 83.6 kg (184 lb 4.9 oz)   06/23/20 1256 83.9 kg (185 lb)     There is no height or weight on file to calculate BMI.        Physical Exam:  Constitutional: Patient appears well-developed and in no distress   Head: Normocephalic and atraumatic.   Eyes: Pupils are equal, round, and reactive to light.   Neck: Normal range of motion. Neck supple.   Cardiovascular: Normal rate, regular rhythm and normal heart sounds.  Pulmonary/Chest: Effort normal and breath sounds are clear  Abdominal: Soft. Bowel sounds are normal.   Musculoskeletal: Normal range of motion.   Neurological: Alert and oriented to person, place, and time.   Psychiatric: Normal mood and affect. Behavior is normal.   Skin: Skin is warm and dry      ASSESSMENT & PLAN:     Acute Left-Sided Sacral Fracture and Acute Fracture Anterior Left acetabulum, fracture of L3 s/p mechanical fall , ongoing  Vitamin-D deficiency, chronic, stable  - Orthopedic Surgery (Dr. Torres) evaluated: no surgical intervention at this time, partial weight-bearing on the left leg, can follow up with ortho as outpatient.   -continue calcium-vitamin D3 supplement b.i.d.  -7/17 stable. No pain.      Debility R/T recent Left-Sided Sacral Fracture and Acute Fracture Anterior Left acetabulum, fracture of L3 s/p mechanical fall, ongoing   - Continue with PT/OT for gait training and strengthening and restoration of ADL's        Essential hypertension,  chronic, stable  Paroxysmal Atrial Fibrillation, chronic, stable  - CHADSVASC 5>Concerns for fall history and risk, defer anticoagulation until patient can be seen by PCP  - Continue amiodarone 200mg daily, amlodipine 10mg daily, metoprolol 50mg BID, furosemide 20mg daily        PAD, chronic, stable  - Continue cilostazol 100mg BID, aspirin, omega3, rosuvastatin 10mg daily        Instructions for the patient:      Scheduled Follow-up :  No future appointments.    Post Visit Medication List:     Medication List          Accurate as of July 17, 2020 11:59 PM. If you have any questions, ask your nurse or doctor.            CONTINUE taking these medications    amiodarone 200 MG Tab  Commonly known as: PACERONE  TAKE 1 TABLET BY MOUTH EVERY DAY     amLODIPine 10 MG tablet  Commonly known as: NORVASC  Take 1 tablet (10 mg total) by mouth once daily.     aspirin 81 MG EC tablet  Commonly known as: ECOTRIN     CALCIUM 500 + D 500 mg(1,250mg) -200 unit per tablet  Generic drug: calcium-vitamin D3     cilostazoL 100 MG Tab  Commonly known as: PLETAL  TAKE 1 TABLET BY MOUTH TWO TIMES A DAY     fish oil-omega-3 fatty acids 300-1,000 mg capsule     furosemide 20 MG tablet  Commonly known as: LASIX  TAKE 1 TABLET BY MOUTH EVERY DAY     metoprolol tartrate 50 MG tablet  Commonly known as: LOPRESSOR  Take 1 tablet (50 mg total) by mouth 2 (two) times daily.     rosuvastatin 10 MG tablet  Commonly known as: CRESTOR  TAKE 1 TABLET BY MOUTH EVERY EVENING              Signing Physician:  Christal Hilliard NP       Due to unresolved technical issue with EMR, Medication list on this note summary may not be accurate.

## 2020-07-22 ENCOUNTER — DOCUMENTATION ONLY (OUTPATIENT)
Dept: PRIMARY CARE CLINIC | Facility: CLINIC | Age: 80
End: 2020-07-22

## 2020-07-24 NOTE — PROGRESS NOTES
[facility]   Re-evaluate Visit  DOS 7/22/2020     PRESENTING HISTORY     Chief Complaint/Reason for Admission:  Evaluate progression in therapy and chronic diseases    PCP: Rajni No MD   Date of Admit: 6/23/2020  Date of Discharge: 6/25/2020  Discharge to: [facility]    History of Present Illness:  Ms. Yanni Car is a 79 y.o. female who present to hospital after a mechanical fall at home, and was found to have a Left-Sided Sacral Fracture and Acute Fracture Anterior Left acetabulum, fracture of L3.  Orthopedics recommended no surgical intervention but weight-bearing precautions and therapy.       Patient transferred to [facility] for PT/OT on 6/25/2020    _________________________________________________________    Today:  The resident is currently laying in bed with no acute distress. Appears comfortable. Denies pain, SOB, CP. No report of acute changes from nurse. Resident progressing well with therapy.  Ambulates approximately 100 feet with r/w. LCTAB with no edema.       Past Medical History: Patient has a past medical history of Arthritis, Basal cell carcinoma, Hypertension, and PAF (paroxysmal atrial fibrillation) (8/17/2016).     Past Surgical History: Patient has a past surgical history that includes Hysterectomy and mohs repair.     Social History: Patient reports that she has been smoking cigarettes. She started smoking about 64 years ago. She has a 64.00 pack-year smoking history. She does not have any smokeless tobacco history on file. She reports that she does not drink alcohol.     Family History: family history includes Cancer in her father.    Review of Systems  Constitutional: Negative for fever or fatigue.   Eyes: Negative for blurred vision, double vision and discharge.   Respiratory: Negative for cough, shortness of breath and wheezing.    Cardiovascular: Negative for chest pain, palpitations, and leg swelling.   Gastrointestinal: Negative for abdominal pain,  constipation, diarrhea, nausea and vomiting.   Genitourinary: Negative for dysuria, frequency and urgency.   Musculoskeletal:  + generalized weakness. Negative for pain  Skin: + left middle finger abscess that is healing well  Neurological: Negative for dizziness, speech change, and headaches.   Psychiatric/Behavioral: Negative for depression. The patient is not nervous/anxious.          PAST HISTORY:     Past Medical History:   Diagnosis Date    Arthritis     Basal cell carcinoma     Hypertension     PAF (paroxysmal atrial fibrillation) 8/17/2016    In NSR today-on amiodarone [id number] 4 with 4% risk of yearly CVA  Afib was related to SIRS in 1/2012 She was admitted for pneumonia   1/2019:   Normal EF  Normal diastolic parameters  No valvular disease             Past Surgical History:   Procedure Laterality Date    HYSTERECTOMY      mohs repair         Family History   Problem Relation Age of Onset    Cancer Father     Amblyopia Neg Hx     Blindness Neg Hx     Cataracts Neg Hx     Diabetes Neg Hx     Glaucoma Neg Hx     Hypertension Neg Hx     Macular degeneration Neg Hx     Retinal detachment Neg Hx     Strabismus Neg Hx     Stroke Neg Hx     Thyroid disease Neg Hx          MEDICATIONS & ALLERGIES:     Current Outpatient Medications on File Prior to Visit   Medication Sig Dispense Refill    amiodarone (PACERONE) 200 MG Tab TAKE 1 TABLET BY MOUTH EVERY DAY 90 tablet 3    amLODIPine (NORVASC) 10 MG tablet Take 1 tablet (10 mg total) by mouth once daily. 90 tablet 3    aspirin (ECOTRIN) 81 MG EC tablet Take 81 mg by mouth once daily.        calcium-vitamin D3 (CALCIUM 500 + D) 500 mg(1,250mg) -200 unit per tablet Take 1 tablet by mouth 2 (two) times daily with meals.      cilostazoL (PLETAL) 100 MG Tab TAKE 1 TABLET BY MOUTH TWO TIMES A  tablet 3    fish oil-omega-3 fatty acids 300-1,000 mg capsule Take 2 g by mouth once daily.        furosemide (LASIX) 20 MG tablet TAKE 1 TABLET BY  MOUTH EVERY DAY 90 tablet 3    metoprolol tartrate (LOPRESSOR) 50 MG tablet Take 1 tablet (50 mg total) by mouth 2 (two) times daily. 180 tablet 3    rosuvastatin (CRESTOR) 10 MG tablet TAKE 1 TABLET BY MOUTH EVERY EVENING 90 tablet 3     No current facility-administered medications on file prior to visit.         Review of patient's allergies indicates:   Allergen Reactions    Penicillins Swelling       OBJECTIVE:     Vital Signs:  Wt Readings from Last 1 Encounters:   06/25/20 0511 88 kg (194 lb 0.1 oz)   06/23/20 2224 83.6 kg (184 lb 4.9 oz)   06/23/20 1256 83.9 kg (185 lb)     There is no height or weight on file to calculate BMI.        Physical Exam:  Constitutional: Patient appears well-developed and in no distress   Head: Normocephalic and atraumatic.   Eyes: Pupils are equal, round, and reactive to light.   Neck: Normal range of motion. Neck supple.   Cardiovascular: Normal rate, regular rhythm and normal heart sounds.  Pulmonary/Chest: Effort normal and breath sounds are clear  Abdominal: Soft. Bowel sounds are normal.   Musculoskeletal: Normal range of motion.   Neurological: Alert and oriented to person, place, and time.   Psychiatric: Normal mood and affect. Behavior is normal.   Skin: Skin is warm and dry      ASSESSMENT & PLAN:     Acute Left-Sided Sacral Fracture and Acute Fracture Anterior Left acetabulum, fracture of L3 s/p mechanical fall , ongoing  Vitamin-D deficiency, chronic, stable  - Orthopedic Surgery (Dr. Torres) evaluated: no surgical intervention at this time, partial weight-bearing on the left leg, can follow up with ortho as outpatient.   -continue calcium-vitamin D3 supplement b.i.d.  -7/22 stable. No pain.      Debility R/T recent Left-Sided Sacral Fracture and Acute Fracture Anterior Left acetabulum, fracture of L3 s/p mechanical fall, ongoing   - Continue with PT/OT for gait training and strengthening and restoration of ADL's   - progressing well with therapy.        Essential  hypertension, chronic, stable  Paroxysmal Atrial Fibrillation, chronic, stable  - CHADSVASC 5>Concerns for fall history and risk, defer anticoagulation until patient can be seen by PCP  - Continue amiodarone 200mg daily, amlodipine 10mg daily, metoprolol 50mg BID, furosemide 20mg daily        PAD, chronic, stable  - Continue cilostazol 100mg BID, aspirin, omega3, rosuvastatin 10mg daily        Instructions for the patient:      Scheduled Follow-up :  No future appointments.    Post Visit Medication List:     Medication List          Accurate as of July 22, 2020 11:59 PM. If you have any questions, ask your nurse or doctor.            CONTINUE taking these medications    amiodarone 200 MG Tab  Commonly known as: PACERONE  TAKE 1 TABLET BY MOUTH EVERY DAY     amLODIPine 10 MG tablet  Commonly known as: NORVASC  Take 1 tablet (10 mg total) by mouth once daily.     aspirin 81 MG EC tablet  Commonly known as: ECOTRIN     CALCIUM 500 + D 500 mg(1,250mg) -200 unit per tablet  Generic drug: calcium-vitamin D3     cilostazoL 100 MG Tab  Commonly known as: PLETAL  TAKE 1 TABLET BY MOUTH TWO TIMES A DAY     fish oil-omega-3 fatty acids 300-1,000 mg capsule     furosemide 20 MG tablet  Commonly known as: LASIX  TAKE 1 TABLET BY MOUTH EVERY DAY     metoprolol tartrate 50 MG tablet  Commonly known as: LOPRESSOR  Take 1 tablet (50 mg total) by mouth 2 (two) times daily.     rosuvastatin 10 MG tablet  Commonly known as: CRESTOR  TAKE 1 TABLET BY MOUTH EVERY EVENING              Signing Physician:  Christal Hilliard NP       Due to unresolved technical issue with EMR, Medication list on this note summary may not be accurate.

## 2020-07-28 ENCOUNTER — HOSPITAL ENCOUNTER (OUTPATIENT)
Dept: RADIOLOGY | Facility: HOSPITAL | Age: 80
Discharge: HOME | End: 2020-07-28
Attending: ORTHOPAEDIC SURGERY
Payer: MEDICARE

## 2020-07-28 ENCOUNTER — OFFICE VISIT (OUTPATIENT)
Dept: ORTHOPEDICS | Facility: CLINIC | Age: 80
End: 2020-07-28
Payer: MEDICARE

## 2020-07-28 VITALS — BODY MASS INDEX: 31.18 KG/M2 | WEIGHT: 194 LBS | HEIGHT: 66 IN

## 2020-07-28 DIAGNOSIS — S32.415A CLOSED NONDISPLACED FRACTURE OF ANTERIOR WALL OF LEFT ACETABULUM, INITIAL ENCOUNTER: ICD-10-CM

## 2020-07-28 DIAGNOSIS — S32.415D CLOSED NONDISPLACED FRACTURE OF ANTERIOR WALL OF LEFT ACETABULUM WITH ROUTINE HEALING, SUBSEQUENT ENCOUNTER: Primary | ICD-10-CM

## 2020-07-28 PROCEDURE — 1126F AMNT PAIN NOTED NONE PRSNT: CPT | Mod: S$GLB,,, | Performed by: PHYSICIAN ASSISTANT

## 2020-07-28 PROCEDURE — 1159F PR MEDICATION LIST DOCUMENTED IN MEDICAL RECORD: ICD-10-PCS | Mod: S$GLB,,, | Performed by: PHYSICIAN ASSISTANT

## 2020-07-28 PROCEDURE — 1126F PR PAIN SEVERITY QUANTIFIED, NO PAIN PRESENT: ICD-10-PCS | Mod: S$GLB,,, | Performed by: PHYSICIAN ASSISTANT

## 2020-07-28 PROCEDURE — 73521 X-RAY EXAM HIPS BI 2 VIEWS: CPT | Mod: TC,PN

## 2020-07-28 PROCEDURE — 99202 OFFICE O/P NEW SF 15 MIN: CPT | Mod: S$GLB,,, | Performed by: PHYSICIAN ASSISTANT

## 2020-07-28 PROCEDURE — 1101F PT FALLS ASSESS-DOCD LE1/YR: CPT | Mod: CPTII,S$GLB,, | Performed by: PHYSICIAN ASSISTANT

## 2020-07-28 PROCEDURE — 99202 PR OFFICE/OUTPT VISIT, NEW, LEVL II, 15-29 MIN: ICD-10-PCS | Mod: S$GLB,,, | Performed by: PHYSICIAN ASSISTANT

## 2020-07-28 PROCEDURE — 73521 XR HIPS BILATERAL 2 VIEW INCL AP PELVIS: ICD-10-PCS | Mod: 26,,, | Performed by: RADIOLOGY

## 2020-07-28 PROCEDURE — 1159F MED LIST DOCD IN RCRD: CPT | Mod: S$GLB,,, | Performed by: PHYSICIAN ASSISTANT

## 2020-07-28 PROCEDURE — 99999 PR PBB SHADOW E&M-EST. PATIENT-LVL III: ICD-10-PCS | Mod: PBBFAC,,, | Performed by: PHYSICIAN ASSISTANT

## 2020-07-28 PROCEDURE — 99999 PR PBB SHADOW E&M-EST. PATIENT-LVL III: CPT | Mod: PBBFAC,,, | Performed by: PHYSICIAN ASSISTANT

## 2020-07-28 PROCEDURE — 73521 X-RAY EXAM HIPS BI 2 VIEWS: CPT | Mod: 26,,, | Performed by: RADIOLOGY

## 2020-07-28 PROCEDURE — 1101F PR PT FALLS ASSESS DOC 0-1 FALLS W/OUT INJ PAST YR: ICD-10-PCS | Mod: CPTII,S$GLB,, | Performed by: PHYSICIAN ASSISTANT

## 2020-07-28 NOTE — LETTER
July 29, 2020      Jonah Torres Jr., MD  [address]  [place] [address]           [facility]  [address]  [place] [address]  Phone: [phone]          Patient: Yanni Car   MR Number: [id number]   YOB: 1940   Date of Visit: 7/28/2020       Dear Dr. Jonah Torres Jr.:    Thank you for referring Yanni Car to me for evaluation. Attached you will find relevant portions of my assessment and plan of care.    If you have questions, please do not hesitate to call me. I look forward to following Yanni Car along with you.    Sincerely,    Ck Patel PA-C    Enclosure  CC:  No Recipients    If you would like to receive this communication electronically, please contact [email or web address] or [phone] to request more information on [employer] Link access.    For providers and/or their staff who would like to refer a patient to [facility], please contact us through our one-stop-shop provider referral line, [facility], at [phone].    If you feel you have received this communication in error or would no longer like to receive these types of communications, please e-mail [email or web address]

## 2020-07-29 ENCOUNTER — DOCUMENTATION ONLY (OUTPATIENT)
Dept: PRIMARY CARE CLINIC | Facility: CLINIC | Age: 80
End: 2020-07-29

## 2020-07-29 VITALS
DIASTOLIC BLOOD PRESSURE: 76 MMHG | OXYGEN SATURATION: 97 % | SYSTOLIC BLOOD PRESSURE: 125 MMHG | RESPIRATION RATE: 20 BRPM | HEART RATE: 69 BPM | TEMPERATURE: 98 F

## 2020-07-29 NOTE — PROGRESS NOTES
"Subjective:      Patient ID: Yanni Car is a 79 y.o. female.    Chief Complaint: Consult and ED f/up      HPI: Yanni Car is a 79-year-old female in clinic today for follow-up of pubic rami fracture.  Patient was admitted about 5 weeks ago for this problem.  Patient states that since then her symptoms have much improved and she is able to walk with much less pain now.  Patient has no new complaints at this time    Past Medical History:   Diagnosis Date    Arthritis     Basal cell carcinoma     Hypertension     PAF (paroxysmal atrial fibrillation) 8/17/2016    In NSR today-on amiodarone [id number] 4 with 4% risk of yearly CVA  Afib was related to SIRS in 1/2012 She was admitted for pneumonia   1/2019:   Normal EF  Normal diastolic parameters  No valvular disease             Current Outpatient Medications:     amiodarone (PACERONE) 200 MG Tab, TAKE 1 TABLET BY MOUTH EVERY DAY, Disp: 90 tablet, Rfl: 3    amLODIPine (NORVASC) 10 MG tablet, Take 1 tablet (10 mg total) by mouth once daily., Disp: 90 tablet, Rfl: 3    aspirin (ECOTRIN) 81 MG EC tablet, Take 81 mg by mouth once daily.  , Disp: , Rfl:     calcium-vitamin D3 (CALCIUM 500 + D) 500 mg(1,250mg) -200 unit per tablet, Take 1 tablet by mouth 2 (two) times daily with meals., Disp: , Rfl:     cilostazoL (PLETAL) 100 MG Tab, TAKE 1 TABLET BY MOUTH TWO TIMES A DAY, Disp: 180 tablet, Rfl: 3    fish oil-omega-3 fatty acids 300-1,000 mg capsule, Take 2 g by mouth once daily.  , Disp: , Rfl:     furosemide (LASIX) 20 MG tablet, TAKE 1 TABLET BY MOUTH EVERY DAY, Disp: 90 tablet, Rfl: 3    metoprolol tartrate (LOPRESSOR) 50 MG tablet, Take 1 tablet (50 mg total) by mouth 2 (two) times daily., Disp: 180 tablet, Rfl: 3    rosuvastatin (CRESTOR) 10 MG tablet, TAKE 1 TABLET BY MOUTH EVERY EVENING, Disp: 90 tablet, Rfl: 3  Review of patient's allergies indicates:   Allergen Reactions    Penicillins Swelling       Ht 5' 6" (1.676 m)   Wt 88 kg (194 lb 0.1 oz)  "  BMI 31.31 kg/m²     ROS      Objective:    Ortho Exam   Left lower extremity:  No pain with internal/external rotation  No TTP  No pain with flexion/extension of the hip  Knee ROM is full  Patient is able to ambulate for me in clinic today  Sensation and pulses are intact    GEN: Well developed, well nourished female. AAOX3. No acute distress.   Normocephalic, atraumatic.   REYNA  Breathing unlabored.  Mood and affect appropriate.        Assessment:     Imaging:  X-ray shows healing fractures of the pelvis        1. Closed nondisplaced fracture of anterior wall of left acetabulum with routine healing, subsequent encounter          Plan:       I explained the patient that this is a fracture that is not treated operatively and she should continue to weight bear as tolerated.  Pain should resolve over time.  We will continue to monitor that the fracture heals with future x-rays.  Patient should reach out if symptoms worsen or fail to improve     Follow up in about 4 weeks (around 8/25/2020).

## 2020-07-29 NOTE — PROGRESS NOTES
[facility]   Discharge Note  DOS 7/29/2020     PRESENTING HISTORY     Chief Complaint/Reason for Admission:  Discharge Note    PCP: Rajni No MD   Date of Admit: 6/23/2020  Date of Discharge: 6/25/2020  Discharge to: [facility]    History of Present Illness:  Ms. Yanni Car is a 79 y.o. female who present to hospital after a mechanical fall at home, and was found to have a Left-Sided Sacral Fracture and Acute Fracture Anterior Left acetabulum, fracture of L3.  Orthopedics recommended no surgical intervention but weight-bearing precautions and therapy.       Patient transferred to [facility] for PT/OT on 6/25/2020    _________________________________________________________    Today:  The resident is discharged home today with home health for PT/OT, skilled nursing, and aide. Appears comfortable. Progressing well with therapy. VSS. Afebrile.        Past Medical History: Patient has a past medical history of Arthritis, Basal cell carcinoma, Hypertension, and PAF (paroxysmal atrial fibrillation) (8/17/2016).     Past Surgical History: Patient has a past surgical history that includes Hysterectomy and mohs repair.     Social History: Patient reports that she has been smoking cigarettes. She started smoking about 64 years ago. She has a 64.00 pack-year smoking history. She does not have any smokeless tobacco history on file. She reports that she does not drink alcohol.     Family History: family history includes Cancer in her father.    Review of Systems  Constitutional: Negative for fever or fatigue.   Eyes: Negative for blurred vision, double vision and discharge.   Respiratory: Negative for cough, shortness of breath and wheezing.    Cardiovascular: Negative for chest pain, palpitations, and leg swelling.   Gastrointestinal: Negative for abdominal pain, constipation, diarrhea, nausea and vomiting.   Genitourinary: Negative for dysuria, frequency and urgency.   Musculoskeletal:  +  generalized weakness. Negative for pain  Skin: no report of skin issues  Neurological: Negative for dizziness, speech change, and headaches.   Psychiatric/Behavioral: Negative for depression. The patient is not nervous/anxious.          PAST HISTORY:     Past Medical History:   Diagnosis Date    Arthritis     Basal cell carcinoma     Hypertension     PAF (paroxysmal atrial fibrillation) 8/17/2016    In NSR today-on amiodarone [id number] 4 with 4% risk of yearly CVA  Afib was related to SIRS in 1/2012 She was admitted for pneumonia   1/2019:   Normal EF  Normal diastolic parameters  No valvular disease             Past Surgical History:   Procedure Laterality Date    HYSTERECTOMY      mohs repair         Family History   Problem Relation Age of Onset    Cancer Father     Amblyopia Neg Hx     Blindness Neg Hx     Cataracts Neg Hx     Diabetes Neg Hx     Glaucoma Neg Hx     Hypertension Neg Hx     Macular degeneration Neg Hx     Retinal detachment Neg Hx     Strabismus Neg Hx     Stroke Neg Hx     Thyroid disease Neg Hx          MEDICATIONS & ALLERGIES:     Current Outpatient Medications on File Prior to Visit   Medication Sig Dispense Refill    amiodarone (PACERONE) 200 MG Tab TAKE 1 TABLET BY MOUTH EVERY DAY 90 tablet 3    amLODIPine (NORVASC) 10 MG tablet Take 1 tablet (10 mg total) by mouth once daily. 90 tablet 3    aspirin (ECOTRIN) 81 MG EC tablet Take 81 mg by mouth once daily.        calcium-vitamin D3 (CALCIUM 500 + D) 500 mg(1,250mg) -200 unit per tablet Take 1 tablet by mouth 2 (two) times daily with meals.      cilostazoL (PLETAL) 100 MG Tab TAKE 1 TABLET BY MOUTH TWO TIMES A  tablet 3    fish oil-omega-3 fatty acids 300-1,000 mg capsule Take 2 g by mouth once daily.        furosemide (LASIX) 20 MG tablet TAKE 1 TABLET BY MOUTH EVERY DAY 90 tablet 3    metoprolol tartrate (LOPRESSOR) 50 MG tablet Take 1 tablet (50 mg total) by mouth 2 (two) times daily. 180 tablet 3     rosuvastatin (CRESTOR) 10 MG tablet TAKE 1 TABLET BY MOUTH EVERY EVENING 90 tablet 3     No current facility-administered medications on file prior to visit.         Review of patient's allergies indicates:   Allergen Reactions    Penicillins Swelling       OBJECTIVE:     Vital Signs:  Vitals:    07/29/20 1500   BP: 125/76   Pulse: 69   Resp: 20   Temp: 97.5 °F (36.4 °C)   SpO2: 97%     Wt Readings from Last 1 Encounters:   07/28/20 1005 88 kg (194 lb 0.1 oz)     There is no height or weight on file to calculate BMI.        Physical Exam:  Constitutional: Patient appears well-developed and in no distress   Head: Normocephalic and atraumatic.   Eyes: Pupils are equal, round, and reactive to light.   Neck: Normal range of motion. Neck supple.   Cardiovascular: Normal rate, regular rhythm and normal heart sounds.  Pulmonary/Chest: Effort normal and breath sounds are clear  Abdominal: Soft. Bowel sounds are normal.   Musculoskeletal: Normal range of motion.   Neurological: Alert and oriented to person, place, and time.   Psychiatric: Normal mood and affect. Behavior is normal.   Skin: Skin is warm and dry      ASSESSMENT & PLAN:     Acute Left-Sided Sacral Fracture and Acute Fracture Anterior Left acetabulum, fracture of L3 s/p mechanical fall , ongoing  Vitamin-D deficiency, chronic, stable  - Orthopedic Surgery (Dr. Torres) evaluated: no surgical intervention at this time, partial weight-bearing on the left leg, can follow up with ortho as outpatient.   -continue calcium-vitamin D3 supplement b.i.d.  -7/29 D/C home today with home health     Debility R/T recent Left-Sided Sacral Fracture and Acute Fracture Anterior Left acetabulum, fracture of L3 s/p mechanical fall, ongoing   - Continue with PT/OT for gait training and strengthening and restoration of ADL's   - d/c home with home health on 7/29       Essential hypertension, chronic, stable  Paroxysmal Atrial Fibrillation, chronic, stable  - CHADSVASC 5>Concerns for  fall history and risk, defer anticoagulation until patient can be seen by PCP  - Continue amiodarone 200mg daily, amlodipine 10mg daily, metoprolol 50mg BID, furosemide 20mg daily        PAD, chronic, stable  - Continue cilostazol 100mg BID, aspirin, omega3, rosuvastatin 10mg daily        Instructions for the patient:      Scheduled Follow-up :  Future Appointments   Date Time Provider Department Center   8/21/2020  1:00 PM Ck Patel PA-C [facility]ENDR[facility]       Post Visit Medication List:     Medication List          Accurate as of July 29, 2020  3:11 PM. If you have any questions, ask your nurse or doctor.            CONTINUE taking these medications    amiodarone 200 MG Tab  Commonly known as: PACERONE  TAKE 1 TABLET BY MOUTH EVERY DAY     amLODIPine 10 MG tablet  Commonly known as: NORVASC  Take 1 tablet (10 mg total) by mouth once daily.     aspirin 81 MG EC tablet  Commonly known as: ECOTRIN     CALCIUM 500 + D 500 mg(1,250mg) -200 unit per tablet  Generic drug: calcium-vitamin D3     cilostazoL 100 MG Tab  Commonly known as: PLETAL  TAKE 1 TABLET BY MOUTH TWO TIMES A DAY     fish oil-omega-3 fatty acids 300-1,000 mg capsule     furosemide 20 MG tablet  Commonly known as: LASIX  TAKE 1 TABLET BY MOUTH EVERY DAY     metoprolol tartrate 50 MG tablet  Commonly known as: LOPRESSOR  Take 1 tablet (50 mg total) by mouth 2 (two) times daily.     rosuvastatin 10 MG tablet  Commonly known as: CRESTOR  TAKE 1 TABLET BY MOUTH EVERY EVENING            Total time of the visit 36 min    Signing Physician:  Christal Hilliard NP       Due to unresolved technical issue with EMR, Medication list on this note summary may not be accurate.

## 2020-07-30 PROCEDURE — G0180 MD CERTIFICATION HHA PATIENT: HCPCS | Mod: ,,, | Performed by: INTERNAL MEDICINE

## 2020-07-30 PROCEDURE — G0180 PR HOME HEALTH MD CERTIFICATION: ICD-10-PCS | Mod: ,,, | Performed by: INTERNAL MEDICINE

## 2020-08-10 ENCOUNTER — EXTERNAL HOME HEALTH (OUTPATIENT)
Dept: HOME HEALTH SERVICES | Facility: HOSPITAL | Age: 80
End: 2020-08-10
Payer: MEDICARE

## 2020-08-21 ENCOUNTER — OFFICE VISIT (OUTPATIENT)
Dept: ORTHOPEDICS | Facility: CLINIC | Age: 80
End: 2020-08-21
Payer: MEDICARE

## 2020-08-21 ENCOUNTER — HOSPITAL ENCOUNTER (OUTPATIENT)
Dept: RADIOLOGY | Facility: HOSPITAL | Age: 80
Discharge: HOME | End: 2020-08-21
Attending: PHYSICIAN ASSISTANT
Payer: MEDICARE

## 2020-08-21 VITALS — BODY MASS INDEX: 31.18 KG/M2 | WEIGHT: 194 LBS | HEIGHT: 66 IN

## 2020-08-21 DIAGNOSIS — S32.415D CLOSED NONDISPLACED FRACTURE OF ANTERIOR WALL OF LEFT ACETABULUM WITH ROUTINE HEALING, SUBSEQUENT ENCOUNTER: ICD-10-CM

## 2020-08-21 DIAGNOSIS — S32.415D CLOSED NONDISPLACED FRACTURE OF ANTERIOR WALL OF LEFT ACETABULUM WITH ROUTINE HEALING, SUBSEQUENT ENCOUNTER: Primary | ICD-10-CM

## 2020-08-21 PROCEDURE — 73521 X-RAY EXAM HIPS BI 2 VIEWS: CPT | Mod: TC,PN

## 2020-08-21 PROCEDURE — 73521 X-RAY EXAM HIPS BI 2 VIEWS: CPT | Mod: 26,,, | Performed by: RADIOLOGY

## 2020-08-21 PROCEDURE — 99213 OFFICE O/P EST LOW 20 MIN: CPT | Mod: S$GLB,,, | Performed by: PHYSICIAN ASSISTANT

## 2020-08-21 PROCEDURE — 99999 PR PBB SHADOW E&M-EST. PATIENT-LVL III: CPT | Mod: PBBFAC,,, | Performed by: PHYSICIAN ASSISTANT

## 2020-08-21 PROCEDURE — 73521 XR HIPS BILATERAL 2 VIEW INCL AP PELVIS: ICD-10-PCS | Mod: 26,,, | Performed by: RADIOLOGY

## 2020-08-21 PROCEDURE — 99213 PR OFFICE/OUTPT VISIT, EST, LEVL III, 20-29 MIN: ICD-10-PCS | Mod: S$GLB,,, | Performed by: PHYSICIAN ASSISTANT

## 2020-08-21 PROCEDURE — 1101F PT FALLS ASSESS-DOCD LE1/YR: CPT | Mod: CPTII,S$GLB,, | Performed by: PHYSICIAN ASSISTANT

## 2020-08-21 PROCEDURE — 99999 PR PBB SHADOW E&M-EST. PATIENT-LVL III: ICD-10-PCS | Mod: PBBFAC,,, | Performed by: PHYSICIAN ASSISTANT

## 2020-08-21 PROCEDURE — 1126F AMNT PAIN NOTED NONE PRSNT: CPT | Mod: S$GLB,,, | Performed by: PHYSICIAN ASSISTANT

## 2020-08-21 PROCEDURE — 1159F PR MEDICATION LIST DOCUMENTED IN MEDICAL RECORD: ICD-10-PCS | Mod: S$GLB,,, | Performed by: PHYSICIAN ASSISTANT

## 2020-08-21 PROCEDURE — 1126F PR PAIN SEVERITY QUANTIFIED, NO PAIN PRESENT: ICD-10-PCS | Mod: S$GLB,,, | Performed by: PHYSICIAN ASSISTANT

## 2020-08-21 PROCEDURE — 1159F MED LIST DOCD IN RCRD: CPT | Mod: S$GLB,,, | Performed by: PHYSICIAN ASSISTANT

## 2020-08-21 PROCEDURE — 1101F PR PT FALLS ASSESS DOC 0-1 FALLS W/OUT INJ PAST YR: ICD-10-PCS | Mod: CPTII,S$GLB,, | Performed by: PHYSICIAN ASSISTANT

## 2020-08-21 NOTE — PROGRESS NOTES
Subjective:      Patient ID: Yanni Car is a 79 y.o. female.    Chief Complaint: Follow-up and Fracture of the Pelvis      HPI: Yanni Car is a 79-year-old female in clinic today for follow-up of pubic rami fracture.  Injury occurred about 9 weeks ago.  Patient states that she is no longer experiencing any pain whatsoever.  Patient arrived to clinic today in a wheelchair, but states that she only use the wheelchair due to the long distance should trial to get the clinic.  Patient normally gets around with a walker and states that she has no trouble doing so.  Patient has no new complaints today    Past Medical History:   Diagnosis Date    Arthritis     Basal cell carcinoma     Hypertension     PAF (paroxysmal atrial fibrillation) 8/17/2016    In NSR today-on amiodarone [id number] 4 with 4% risk of yearly CVA  Afib was related to SIRS in 1/2012 She was admitted for pneumonia   1/2019:   Normal EF  Normal diastolic parameters  No valvular disease             Current Outpatient Medications:     amiodarone (PACERONE) 200 MG Tab, TAKE 1 TABLET BY MOUTH EVERY DAY, Disp: 90 tablet, Rfl: 3    amLODIPine (NORVASC) 10 MG tablet, Take 1 tablet (10 mg total) by mouth once daily., Disp: 90 tablet, Rfl: 3    aspirin (ECOTRIN) 81 MG EC tablet, Take 81 mg by mouth once daily.  , Disp: , Rfl:     calcium-vitamin D3 (CALCIUM 500 + D) 500 mg(1,250mg) -200 unit per tablet, Take 1 tablet by mouth 2 (two) times daily with meals., Disp: , Rfl:     cilostazoL (PLETAL) 100 MG Tab, TAKE 1 TABLET BY MOUTH TWO TIMES A DAY, Disp: 180 tablet, Rfl: 3    fish oil-omega-3 fatty acids 300-1,000 mg capsule, Take 2 g by mouth once daily.  , Disp: , Rfl:     furosemide (LASIX) 20 MG tablet, TAKE 1 TABLET BY MOUTH EVERY DAY, Disp: 90 tablet, Rfl: 3    metoprolol tartrate (LOPRESSOR) 50 MG tablet, Take 1 tablet (50 mg total) by mouth 2 (two) times daily., Disp: 180 tablet, Rfl: 3    rosuvastatin (CRESTOR) 10 MG tablet, TAKE 1 TABLET BY  "MOUTH EVERY EVENING, Disp: 90 tablet, Rfl: 3  Review of patient's allergies indicates:   Allergen Reactions    Penicillins Swelling       Ht 5' 6" (1.676 m)   Wt 88 kg (194 lb)   BMI 31.31 kg/m²     ROS      Objective:    Ortho Exam   Left hip:  No TTP  No pain with internal/external rotation  No pain with flexion/extension of the hip  Knee ROM is full  Patient ambulates well without limp for a few steps  Sensation and pulses are intact    GEN: Well developed, well nourished female. AAOX3. No acute distress.   Normocephalic, atraumatic.   REYNA  Breathing unlabored.  Mood and affect appropriate.        Assessment:     Imaging:  X-rays show healing pubic ramus fracture        1. Closed nondisplaced fracture of anterior wall of left acetabulum with routine healing, subsequent encounter          Plan:       I explained to the patient that I think that since she is no longer having any pain and is able to get around with her walker that we no longer need to follow her for this problem.  Cautioned the patient to return to clinic if pain returns or if she begins having difficulty with ambulation.  I also discussed fall risk with the patient explained that the most likely placed have a fall is the bathroom, so she should be extra careful getting into and out of the bathtub and on and off the toilet.  Patient understands this.  All questions answered.     Follow up if symptoms worsen or fail to improve.          "

## 2020-10-14 DIAGNOSIS — S72.002A: Primary | ICD-10-CM

## 2020-10-14 DIAGNOSIS — S32.82XA: Primary | ICD-10-CM

## 2020-11-02 ENCOUNTER — CLINICAL SUPPORT (OUTPATIENT)
Dept: REHABILITATION | Facility: HOSPITAL | Age: 80
End: 2020-11-02
Payer: MEDICARE

## 2020-11-02 DIAGNOSIS — M25.652 DECREASED RANGE OF MOTION OF BOTH HIPS: ICD-10-CM

## 2020-11-02 DIAGNOSIS — R29.898 DECREASED STRENGTH OF LOWER EXTREMITY: ICD-10-CM

## 2020-11-02 DIAGNOSIS — R26.9 GAIT ABNORMALITY: ICD-10-CM

## 2020-11-02 DIAGNOSIS — M25.651 DECREASED RANGE OF MOTION OF BOTH HIPS: ICD-10-CM

## 2020-11-02 PROCEDURE — 97162 PT EVAL MOD COMPLEX 30 MIN: CPT | Mod: PO

## 2020-11-03 PROBLEM — M25.652 DECREASED RANGE OF MOTION OF BOTH HIPS: Status: ACTIVE | Noted: 2020-11-03

## 2020-11-03 PROBLEM — R29.898 DECREASED STRENGTH OF LOWER EXTREMITY: Status: ACTIVE | Noted: 2020-11-03

## 2020-11-03 PROBLEM — R26.9 GAIT ABNORMALITY: Status: ACTIVE | Noted: 2020-11-03

## 2020-11-03 PROBLEM — M25.651 DECREASED RANGE OF MOTION OF BOTH HIPS: Status: ACTIVE | Noted: 2020-11-03

## 2020-11-03 NOTE — PLAN OF CARE
[facility]  Physical Therapy Initial Evaluation    Date: 11/2/2020   Name: Yanni Car  Clinic Number: [id number]    Therapy Diagnosis:   Encounter Diagnoses   Name Primary?    Decreased strength of lower extremity     Decreased range of motion of both hips     Gait abnormality      Physician: Rajni No MD    Physician Orders: PT Eval and Treat   Medical Diagnosis from Referral: S72.002A,S32.82XA (ICD-10-CM) - Traumatic fracture of multiple bones of left hip and pelvis  Evaluation Date: 11/2/2020  Authorization Period Expiration: 1/02/2021  Plan of Care Expiration: 1/1/2021  Visit # / Visits authorized: 1/ 12    Time In: 11:53 am  Time Out: 12:30 pm  Total Appointment Time (timed & untimed codes): 37  minutes    Precautions: Standard    Subjective   Date of onset: 2-3 months ago  History of current condition - Yanni reports: the hip has been doing well, came home about 2-3 months ago and has not had much pain in pelvis. No prior injuries to that hip before this. She reports falling and couldn't get up, was taken to MD and found out she had 5 fractured bones. Fell on L side- found out she had osteoporosis when visiting the doctor. Pt reports no pain with activities, except walking- and come on quickly making walking difficult. Would go walking with the nurse with a cane and reported losing concentration while walking and talking and would lose balance. Reports no trouble with stairs (though had difficulty with step to get up on treatment table later during session). Came into therapy ambulating with a rolling walker, but reports walking with cane when had assistance from nurse/PT at home. Reports no numbness or tingling in the leg     Medical History:   Past Medical History:   Diagnosis Date    Arthritis     Basal cell carcinoma     Hypertension     PAF (paroxysmal atrial fibrillation) 8/17/2016    In NSR today-on amiodarone [id number] 4 with 4% risk of yearly CVA  Afib  was related to SIRS in 1/2012 She was admitted for pneumonia   1/2019:   Normal EF  Normal diastolic parameters  No valvular disease             Surgical History:   Yanni Car  has a past surgical history that includes Hysterectomy and mohs repair.    Medications:   Yanni has a current medication list which includes the following prescription(s): amiodarone, amlodipine, aspirin, calcium-vitamin d3, cilostazol, fish oil-omega-3 fatty acids, furosemide, metoprolol tartrate, and rosuvastatin.    Allergies:   Review of patient's allergies indicates:   Allergen Reactions    Penicillins Swelling        Imaging, X-Ray (8/21/2020): Impression:     Healing superior and inferior left pubic rami fractures.     No acute fracture seen.     The patient's known recent left  sacral ala fracture is not seen on this radiograph.    Prior Therapy: Saw orders for home health services in her chart- pt unsure if she had home health PT but reports some people coming in to give exercises and that seemed to have helped.  Social History: lives with their spouse  Occupation: retired  Prior Level of Function: able to get around with no restrictions or pain  Current Level of Function: can do it just slower and walking is challenging.    Pain:  Current 0/10, worst 5/10, best 0/10   Location: left hip    Description: Constant aching hurt when it comes on  Aggravating Factors: Walking  Easing Factors: pain medication- tylenol    Patients goals: to be able to walk again.    Objective     Observation: Observed pt ambulating with rolling walker and in parallel bars- decreased stride length, decreased hip flexion leading to sort of shuffling gait, decreased stance time L LE. Pt had difficulty rising from sitting in chair. Rolling on treatment table went smoothly, but transitioning from supine to sitting took increased time and was difficult for patient.    Posture: forward head, rounded shoulder, sat in chair with weight shifted away from L  hip    Hip Range of Motion:   Left active Left Passive Right active  Right Passive   Flexion 88 90 93 96   Ext. Rotation 43 NT 30 NT   Int. Rotation 14 NT 22 NT             Lower Extremity Strength  Right LE  Left LE    Knee extension: 4+/5 Knee extension: 4+/5   Knee flexion: 4+/5 Knee flexion: 4+/5   Hip flexion: 4-/5 Hip flexion: 3+/5   Hip Internal Rotation:  4+/5    Hip Internal Rotation: 4+/5      Hip External Rotation: 4+/5    Hip External Rotation: 4/5      Hip extension:  NT Hip extension: NT   Hip abduction: 3+/5 Hip abduction: 3+/5   Hip adduction: NT Hip adduction NT   Ankle dorsiflexion: 5/5 Ankle dorsiflexion: 5/5   Ankle plantarflexion: NT Ankle plantarflexion: NT         Special Tests:  AZALEA: negative- muscle guarding though  FADIR: negative      Joint Mobility: hypomobile bilaterally L>R    Sensation: light touch grossly intact    Edema: none noted    Assess Romberg balance score next visit.      Limitation/Restriction for FOTO  Survey    Therapist reviewed FOTO scores for Yanni Car on 11/2/2020.   FOTO documents entered into [employer] - see Media section.    Limitation Score: %- Not assessed this visit will assess next visit.         TREATMENT   Treatment Time In: --  Treatment Time Out: --  Total Treatment time (time-based codes) separate from Evaluation: 0 minutes        Home Exercises and Patient Education Provided    Education provided:   - Role of PT and goals of therapy    Written Home Exercises Provided: no.  Exercises were reviewed and Yanni was able to demonstrate them prior to the end of the session.  Yanni demonstrated good  understanding of the education provided.     See EMR under N/A for exercises provided will provide next visit..    Assessment   Yanni is a 79 y.o. female referred to outpatient Physical Therapy with a medical diagnosis of s/p hip and pelvic fracture. Patient presents with not difficulty ambulating, difficulty with sit to stand transfers, decreased hip ROM, and  decreased lower extremity strength. Pt also reported difficulty with dual tasking causing her to lose her balance while ambulating. Pt would benefit from manual therapy, therapeutic exercise, balance training, and gait training to increase ability to ambulate safely around her house and increase ability to perform transfers into and out of chairs in her house.    Patient prognosis is Excellent.   Patientt will benefit from skilled outpatient Physical Therapy to address the deficits stated above and in the chart below, provide patient /family education, and to maximize patientt's level of independence.     Plan of care discussed with patient: Yes  Patient's spiritual, cultural and educational needs considered and patient is agreeable to the plan of care and goals as stated below:     Anticipated Barriers for therapy: none    Medical Necessity is demonstrated by the following  History  Co-morbidities and personal factors that may impact the plan of care Co-morbidities:   advanced age, history of cancer, HTN and prior pelvic surgery    Personal Factors:   age     moderate   Examination  Body Structures and Functions, activity limitations and participation restrictions that may impact the plan of care Body Regions:   lower extremities  trunk    Body Systems:    ROM  strength  balance  gait  transfers  transitions    Participation Restrictions:   none    Activity limitations:   Learning and applying knowledge  no deficits    General Tasks and Commands  no deficits    Communication  no deficits    Mobility  lifting and carrying objects  walking  driving (bike, car, motorcycle)    Self care  no deficits reported    Domestic Life  doing house work (cleaning house, washing dishes, laundry)  assisting others    Interactions/Relationships  no deficits    Life Areas  no deficits    Community and Social Life  no deficits         high   Clinical Presentation stable and uncomplicated low   Decision Making/ Complexity Score:  moderate     Goals:  Short Term Goals: 4 weeks   1. Pt will understand and be compliant with HEP to promote independent management of current diagnosis.  2. Pt will have increased hip flexion strength to at least 4/5 to decrease shuffling steps during gait and increase safety with ambulation.  3. Pt will be able to ambulate safely with RW for at least 50 ft with no pain to improve ability to ambulate around house.    Long Term Goals: 8 weeks   1. Pt will have decreased FOTO limitation score to demonstrate improved functional mobility.  2. Pt will have increased general LE strength to 5/5 to improve ability to perform sit to stand transfers and improve safety with activities such as toileting.   3. Pt will be able to ambulate safely with least restrictive AD for at least 100 ft to increase ability to ambulate around the house.  4. Pt will have increased hip AROM to at least 95 deg to improve ability to flex hip while ambulating to decrease shuffling steps and improve safety.    Plan   Plan of care Certification: 11/2/2020 to 1/1/2021.    Outpatient Physical Therapy 2 times weekly for 8 weeks to include the following interventions: Electrical Stimulation IFC, Gait Training, Manual Therapy, Moist Heat/ Ice, Neuromuscular Re-ed, Patient Education, Therapeutic Activites, Therapeutic Exercise and Ultrasound.     Kailyn Vallecillo, PT

## 2020-11-04 ENCOUNTER — CLINICAL SUPPORT (OUTPATIENT)
Dept: REHABILITATION | Facility: HOSPITAL | Age: 80
End: 2020-11-04
Payer: MEDICARE

## 2020-11-04 DIAGNOSIS — R29.898 DECREASED STRENGTH OF LOWER EXTREMITY: ICD-10-CM

## 2020-11-04 DIAGNOSIS — R26.9 GAIT ABNORMALITY: ICD-10-CM

## 2020-11-04 PROCEDURE — 97110 THERAPEUTIC EXERCISES: CPT | Mod: PO

## 2020-11-04 NOTE — PROGRESS NOTES
Physical Therapy Treatment Note     Name: Yanni Car  Clinic Number: [id number]    Therapy Diagnosis:   Encounter Diagnoses   Name Primary?    Decreased strength of lower extremity     Gait abnormality      Physician: Rajni No MD    Visit Date: 11/4/2020    Physician Orders: PT Eval and Treat   Medical Diagnosis from Referral: S72.002A,S32.82XA (ICD-10-CM) - Traumatic fracture of multiple bones of left hip and pelvis  Evaluation Date: 11/2/2020  Authorization Period Expiration: 1/02/2021  Plan of Care Expiration: 1/1/2021  Visit # / Visits authorized: 2/ 12      Time In: 2:33 pm  Time Out: 3:15 pm  Total Billable Time: 42 minutes    Precautions: Standard and Fall    Subjective     Pt reports: she is feeling a little tired today but is otherwise feeling good.  She was compliant with home exercise program.  Response to previous treatment: favorable-no change in symptoms  Functional change: none yet    Pain: 0/10  Location: left hip      Objective     Yanni received therapeutic exercises to develop strength and balance for 36 minutes including:    Date  11/4/2020   VISIT 2/5   FOTO 2/12   POC EXP. DATE    VISIT AMOUNT  MEDICARE TOTAL 90.96  173.78   FACE-TO-FACE 12/2/2020       TABLE:    bridges 2 x 10   clams 2 x 10 YTB supine   SLR 1 x 10                       SEATED:    LAQ 2 x 10 B   Hamstring curls 2 x 10 RTB B   Marching  2 x 10       STANDING:    Marching in parallel bars 2 x 10   SLS 3x                Initials LT       Yanni participated in gait training to improve functional mobility and safety for 6  minutes, including: ambulating in parallel bars with CGA and no AD, cued for improving hip flexion while ambulating to reduce risk of falling.      Home Exercises Provided and Patient Education Provided     Education provided:   - Importance of getting enough foot clearance while ambulating to avoid tripping.    Written Home Exercises Provided: no.  Exercises were reviewed and Yanni was able  to demonstrate them prior to the end of the session.  Yanni demonstrated good  understanding of the education provided.     See EMR under none for exercises provided printer broken, will provide exercises at future visit..    Assessment     Ambulated into therapy with narrow base of support and rolling walker, decreased hip and knee flexion. When ambulating in parallel bars used a step to pattern and had decreased step length and hip and knee flexion. Required cueing for hip flexion, no loss of balance but pt reported feeling wobbly. With single leg stance- pt was apprehensive even in parallel bars with gait belt and therapist assist to attempt. No falls, but maintained balance for about 1 sec before grabbing rails. Attempted standing and gait exercises first until patient fatigued, then performed table exercises.  Yanni is progressing well towards her goals.   Pt prognosis is Good.     Pt will continue to benefit from skilled outpatient physical therapy to address the deficits listed in the problem list box on initial evaluation, provide pt/family education and to maximize pt's level of independence in the home and community environment.     Pt's spiritual, cultural and educational needs considered and pt agreeable to plan of care and goals.     Anticipated barriers to physical therapy: none    Goals: Short Term Goals: 4 weeks   1. Pt will understand and be compliant with HEP to promote independent management of current diagnosis.  2. Pt will have increased hip flexion strength to at least 4/5 to decrease shuffling steps during gait and increase safety with ambulation.  3. Pt will be able to ambulate safely with RW for at least 50 ft with no pain to improve ability to ambulate around house.     Long Term Goals: 8 weeks   1. Pt will have decreased FOTO limitation score to demonstrate improved functional mobility.  2. Pt will have increased general LE strength to 5/5 to improve ability to perform sit to stand  transfers and improve safety with activities such as toileting.   3. Pt will be able to ambulate safely with least restrictive AD for at least 100 ft to increase ability to ambulate around the house.  4. Pt will have increased hip AROM to at least 95 deg to improve ability to flex hip while ambulating to decrease shuffling steps and improve safety.    Plan     Continue with LE strengthening, gait, and balance exercises as pt tolerates.    Kailyn Vallecillo, PT

## 2020-11-09 ENCOUNTER — CLINICAL SUPPORT (OUTPATIENT)
Dept: REHABILITATION | Facility: HOSPITAL | Age: 80
End: 2020-11-09
Payer: MEDICARE

## 2020-11-09 DIAGNOSIS — R26.9 GAIT ABNORMALITY: ICD-10-CM

## 2020-11-09 DIAGNOSIS — R29.898 DECREASED STRENGTH OF LOWER EXTREMITY: ICD-10-CM

## 2020-11-09 PROCEDURE — 97110 THERAPEUTIC EXERCISES: CPT | Mod: PO

## 2020-11-09 NOTE — PROGRESS NOTES
"  Physical Therapy Treatment Note     Name: Yanni Car  Clinic Number: [id number]    Therapy Diagnosis:   No diagnosis found.  Physician: Rajni No MD    Visit Date: 11/9/2020    Physician Orders: PT Eval and Treat   Medical Diagnosis from Referral: S72.002A,S32.82XA (ICD-10-CM) - Traumatic fracture of multiple bones of left hip and pelvis  Evaluation Date: 11/2/2020  Authorization Period Expiration: 1/02/2021  Plan of Care Expiration: 1/1/2021  Visit # / Visits authorized: 3/ 12      Time In: 4:03 pm  Time Out: 4:45 pm  Total Billable Time: 42 minutes    Precautions: Standard and Fall    Subjective     Pt reports: she is feeling good today and has been getting around with no difficulty  She was compliant with home exercise program.  Response to previous treatment: favorable-no change in symptoms  Functional change: none yet    Pain: 0/10  Location: left hip      Objective     Yanni received therapeutic exercises to develop strength and balance for 42 minutes including:    Date  11/9/2020 11/4/2020   VISIT 3/12 2/12   FOTO 3/5 2/5   POC EXP. DATE     VISIT AMOUNT  MEDICARE TOTAL 90.96  264.74 90.96  173.78   FACE-TO-FACE 12/2/2020 12/2/2020        TABLE:     bridges 2 x 10 2 x 10   clams 2 x 10 YTB supine 2 x 10 YTB supine   SLR 2 x 10 1# 1 x 10   Pelvic tilts 20x 3"                        SEATED:     LAQ 2 x 10 B 1# 2 x 10 B   Hamstring curls 2 x 10 RTB B 2 x 10 RTB B   Marching  2 x 10 2 x 10        STANDING:     Marching in parallel bars 2 x 10 2 x 10   SLS 4x B 3x    Side stepping 4 x 8" //bars    Tandem stance 3x 15" B    Walking w/wide ELIJAH 4 x 8" //bars    Initials LT LT         Home Exercises Provided and Patient Education Provided     Education provided:   - Ambulating with wide ELIJAH to provide more stability while walking.    Written Home Exercises Provided: no.  Exercises were reviewed and Yanni was able to demonstrate them prior to the end of the session.  Yanni demonstrated good  " understanding of the education provided.     See EMR under none for exercises provided printer broken, will provide exercises at future visit..    Assessment     Practiced walking with wide ELIJAH in parallel bars today and pt was able to take slightly longer steps with a step through pattern and reported feeling more stable this way. No LOB with balance exercises or gait, but pt cannot hold single leg stance for longer than about 1 or 2 seconds without grabbing the parallel bars for support. Overall pt tolerated all activities listed above with no increase in symptoms. Pt reported wanting to practice balance exercises at home- advised her to leave the balance practice to therapy but encouraged her to begin practicing ambulating with wide ELIJAH with RW.  Yanni is progressing well towards her goals.   Pt prognosis is Good.     Pt will continue to benefit from skilled outpatient physical therapy to address the deficits listed in the problem list box on initial evaluation, provide pt/family education and to maximize pt's level of independence in the home and community environment.     Pt's spiritual, cultural and educational needs considered and pt agreeable to plan of care and goals.     Anticipated barriers to physical therapy: none    Goals: Short Term Goals: 4 weeks   1. Pt will understand and be compliant with HEP to promote independent management of current diagnosis.  2. Pt will have increased hip flexion strength to at least 4/5 to decrease shuffling steps during gait and increase safety with ambulation.  3. Pt will be able to ambulate safely with RW for at least 50 ft with no pain to improve ability to ambulate around house.     Long Term Goals: 8 weeks   1. Pt will have decreased FOTO limitation score to demonstrate improved functional mobility.  2. Pt will have increased general LE strength to 5/5 to improve ability to perform sit to stand transfers and improve safety with activities such as toileting.   3. Pt  will be able to ambulate safely with least restrictive AD for at least 100 ft to increase ability to ambulate around the house.  4. Pt will have increased hip AROM to at least 95 deg to improve ability to flex hip while ambulating to decrease shuffling steps and improve safety.    Plan     Continue with LE strengthening and balance as pt tolerates. Ambulating with wide ELIJAH and single leg balance to improve stability during gait.    Kailyn Vallecillo, PT

## 2020-11-11 ENCOUNTER — CLINICAL SUPPORT (OUTPATIENT)
Dept: REHABILITATION | Facility: HOSPITAL | Age: 80
End: 2020-11-11
Payer: MEDICARE

## 2020-11-11 DIAGNOSIS — R29.898 DECREASED STRENGTH OF LOWER EXTREMITY: ICD-10-CM

## 2020-11-11 DIAGNOSIS — R26.9 GAIT ABNORMALITY: ICD-10-CM

## 2020-11-11 PROCEDURE — 97110 THERAPEUTIC EXERCISES: CPT | Mod: PO

## 2020-11-11 NOTE — PROGRESS NOTES
"  Physical Therapy Treatment Note     Name: Yanni Car  Clinic Number: [id number]    Therapy Diagnosis:   Encounter Diagnoses   Name Primary?    Decreased strength of lower extremity     Gait abnormality      Physician: Rajni No MD    Visit Date: 11/11/2020    Physician Orders: PT Eval and Treat   Medical Diagnosis from Referral: S72.002A,S32.82XA (ICD-10-CM) - Traumatic fracture of multiple bones of left hip and pelvis  Evaluation Date: 11/2/2020  Authorization Period Expiration: 1/02/2021  Plan of Care Expiration: 1/1/2021  Visit # / Visits authorized: 4/ 12      Time In: 3:15 pm  Time Out: 4:00 pm  Total Billable Time: 45 minutes    Precautions: Standard and Fall    Subjective     Pt reports: feeling good  She was compliant with home exercise program.  Response to previous treatment: favorable-no change in symptoms  Functional change: none yet    Pain: 0/10   Location: left hip      Objective     Yanni received therapeutic exercises to develop strength and balance for 45 minutes including:    Date  11/11/2020 11/9/2020 11/4/2020   VISIT 4/12 3/12 2/12   FOTO 4/5 3/5 2/5   POC EXP. DATE      VISIT AMOUNT  MEDICARE TOTAL 90.96  355.70 90.96  264.74 90.96  173.78   FACE-TO-FACE 12/2/2020 12/2/2020 12/2/2020         TABLE:      bridges 2 x 10 2 x 10 2 x 10   clams 2 x 10 RTB supine 2 x 10 YTB supine 2 x 10 YTB supine   SLR 2 x 10 1# 2 x 10 1# 1 x 10   Pelvic tilts 20x 3" 20x 3"                            SEATED:      LAQ 2 x 10 1.5# 2 x 10 B 1# 2 x 10 B   Hamstring curls 2 x 10 GTB B 2 x 10 RTB B 2 x 10 RTB B   Marching  2 x 10 2 x 10 2 x 10         STANDING:      Marching in parallel bars 2 x 10 2 x 10 2 x 10   SLS 4x B 4x B 3x    Side stepping 4 x 8" //bars 4 x 8" //bars    Tandem stance 3 x 15" B 3x 15" B    Walking w/wide ELIJAH 4 x 8" //bars 4 x 8" //bars    Initials LT LT LT         Home Exercises Provided and Patient Education Provided     Education provided:   - Ambulating with wide ELIJAH to provide " more stability while walking.    Written Home Exercises Provided: no.  Exercises were reviewed and Yanni was able to demonstrate them prior to the end of the session.  Yanni demonstrated good  understanding of the education provided.     See EMR under none for exercises provided printer broken, will provide exercises at future visit..    Assessment     Yanni continues to progress well with strengthening and with balance and gait activities. She continues to ambulate with a narrow ELIJAH with her RW, continued to encourage pt to practice ambulating with a larger ELIJAH to promote stability with gait. Tandem balance and single leg are still very difficult for the pt but is slowly making progress. Pt still fatigues quickly with standing exercises. Tolerated exercises well.    Yanni is progressing well towards her goals.   Pt prognosis is Good.     Pt will continue to benefit from skilled outpatient physical therapy to address the deficits listed in the problem list box on initial evaluation, provide pt/family education and to maximize pt's level of independence in the home and community environment.     Pt's spiritual, cultural and educational needs considered and pt agreeable to plan of care and goals.     Anticipated barriers to physical therapy: none    Goals: Short Term Goals: 4 weeks   1. Pt will understand and be compliant with HEP to promote independent management of current diagnosis.  2. Pt will have increased hip flexion strength to at least 4/5 to decrease shuffling steps during gait and increase safety with ambulation.  3. Pt will be able to ambulate safely with RW for at least 50 ft with no pain to improve ability to ambulate around house.     Long Term Goals: 8 weeks   1. Pt will have decreased FOTO limitation score to demonstrate improved functional mobility.  2. Pt will have increased general LE strength to 5/5 to improve ability to perform sit to stand transfers and improve safety with activities  such as toileting.   3. Pt will be able to ambulate safely with least restrictive AD for at least 100 ft to increase ability to ambulate around the house.  4. Pt will have increased hip AROM to at least 95 deg to improve ability to flex hip while ambulating to decrease shuffling steps and improve safety.    Plan     Continue with LE strengthening, gait, and balance as pt tolerates. Ambulating with wide ELIJAH and single leg balance to improve stability during gait.    Kailyn Vallecillo, PT

## 2020-11-16 ENCOUNTER — CLINICAL SUPPORT (OUTPATIENT)
Dept: REHABILITATION | Facility: HOSPITAL | Age: 80
End: 2020-11-16
Payer: MEDICARE

## 2020-11-16 DIAGNOSIS — R29.898 DECREASED STRENGTH OF LOWER EXTREMITY: ICD-10-CM

## 2020-11-16 DIAGNOSIS — R26.9 GAIT ABNORMALITY: ICD-10-CM

## 2020-11-16 PROCEDURE — 97116 GAIT TRAINING THERAPY: CPT | Mod: PO

## 2020-11-16 PROCEDURE — 97110 THERAPEUTIC EXERCISES: CPT | Mod: PO

## 2020-11-18 ENCOUNTER — CLINICAL SUPPORT (OUTPATIENT)
Dept: REHABILITATION | Facility: HOSPITAL | Age: 80
End: 2020-11-18
Payer: MEDICARE

## 2020-11-18 DIAGNOSIS — R26.9 GAIT ABNORMALITY: ICD-10-CM

## 2020-11-18 DIAGNOSIS — R29.898 DECREASED STRENGTH OF LOWER EXTREMITY: ICD-10-CM

## 2020-11-18 PROCEDURE — 97110 THERAPEUTIC EXERCISES: CPT | Mod: PO

## 2020-11-18 NOTE — PROGRESS NOTES
"  Physical Therapy Treatment Note     Name: Yanni Car  Clinic Number: [id number]    Therapy Diagnosis:   Encounter Diagnoses   Name Primary?    Decreased strength of lower extremity     Gait abnormality      Physician: Rajni No MD    Visit Date: 11/18/2020    Physician Orders: PT Eval and Treat   Medical Diagnosis from Referral: S72.002A,S32.82XA (ICD-10-CM) - Traumatic fracture of multiple bones of left hip and pelvis  Evaluation Date: 11/2/2020  Authorization Period Expiration: 1/02/2021  Plan of Care Expiration: 1/1/2021  Visit # / Visits authorized: 6/ 12      Time In: 1:13 pm  Time Out: 1:45 pm  Total Billable Time: 32 minutes    Precautions: Standard and Fall    Subjective     Pt reports: she is feeling pretty good today- no pain.  She was compliant with home exercise program.  Response to previous treatment: favorable- felt fine after  Functional change: none yet    Pain: 0/10   Location: left hip        Objective     Yanni received therapeutic exercises to develop strength and balance for 32 minutes including:    Date  11/18/2020 11/16/2020 11/11/2020 11/9/2020 11/4/2020   VISIT 6/12 5/12 4/12 3/12 2/12   FOTO  5/5 4/5 3/5 2/5   POC EXP. DATE 1/1/2021       VISIT AMOUNT  MEDICARE TOTAL  90.96  446.66 90.96  355.70 90.96  264.74 90.96  173.78   FACE-TO-FACE 12/2/2020 12/2/2020 12/2/2020 12/2/2020 12/2/2020           TABLE:        bridges 2 x 10 2 x 10 2 x 10 2 x 10 2 x 10   clams  2 x 10 RTB supine 2 x 10 RTB supine 2 x 10 YTB supine 2 x 10 YTB supine   SLR 2 x 10 1.5# 2 x 10 1.5# 2 x 10 1# 2 x 10 1# 1 x 10   Pelvic tilts  20x 3" 20x 3" 20x 3"    Pelvic tilts with marching  1 x 10                              SEATED:        LAQ   2 x 10 1.5# 2 x 10 B 1# 2 x 10 B   Hamstring curls   2 x 10 GTB B 2 x 10 RTB B 2 x 10 RTB B   Marching  2 x 10  2 x 10 2 x 10 2 x 10           STANDING:        Marching in parallel bars 2x 8ft in // bars 2 x 10 2 x 10 2 x 10 2 x 10   SLS 4x B 3x B 4x B 4x B 3x    Side " "stepping   4 x 8" //bars 4 x 8" //bars    Tandem stance 3 x 15" B 3 x 15" B 3 x 15" B 3x 15" B    Walking w/wide ELIJAH   4 x 8" //bars 4 x 8" //bars    Initials LT LT LT LT LT     Gait training for 15 mins: ambulating with SPC- required multiple cues for proper sequencing and keeping the cane close to the body, and to pick feet up while ambulating.  74 ft with SBA from therapist. No LOB. Used a step to pattern and decreased step length and fran compared with RW.     Home Exercises Provided and Patient Education Provided     Education provided:   - Ambulating with wide ELIJAH to provide more stability while walking.    Written Home Exercises Provided: no.  Exercises were reviewed and Yanni was able to demonstrate them prior to the end of the session.  Yanni demonstrated good  understanding of the education provided.     See EMR under none for exercises provided printer broken, will provide exercises at future visit..    Assessment     Yanni was able to ambulate a little farther with the cane today. Still using a step to gait pattern. Pt was much better with sequencing and keeping the cane close to her while ambulating and required very few cues to correct these. Was safe ambulating with SPC, but still fatigues after about 15 mins of gait/balance training and needs to sit down to rest. Advised pt she would be safe to ambulate around the house with her SPC, but should still use RW when going places that are more crowded or where she will be on her feet for longer periods of time.  Yanni is progressing well towards her goals.   Pt prognosis is Good.     Pt will continue to benefit from skilled outpatient physical therapy to address the deficits listed in the problem list box on initial evaluation, provide pt/family education and to maximize pt's level of independence in the home and community environment.     Pt's spiritual, cultural and educational needs considered and pt agreeable to plan of care and goals.   "   Anticipated barriers to physical therapy: none    Goals: Short Term Goals: 4 weeks   1. Pt will understand and be compliant with HEP to promote independent management of current diagnosis.  2. Pt will have increased hip flexion strength to at least 4/5 to decrease shuffling steps during gait and increase safety with ambulation.  3. Pt will be able to ambulate safely with RW for at least 50 ft with no pain to improve ability to ambulate around house.     Long Term Goals: 8 weeks   1. Pt will have decreased FOTO limitation score to demonstrate improved functional mobility.  2. Pt will have increased general LE strength to 5/5 to improve ability to perform sit to stand transfers and improve safety with activities such as toileting.   3. Pt will be able to ambulate safely with least restrictive AD for at least 100 ft to increase ability to ambulate around the house.  4. Pt will have increased hip AROM to at least 95 deg to improve ability to flex hip while ambulating to decrease shuffling steps and improve safety.    Plan     Continue with LE strengthening, gait, and balance as pt tolerates. Ambulating with proper sequencing with SPC.    Kailyn Vallecillo, PT

## 2020-11-23 ENCOUNTER — CLINICAL SUPPORT (OUTPATIENT)
Dept: REHABILITATION | Facility: HOSPITAL | Age: 80
End: 2020-11-23
Payer: MEDICARE

## 2020-11-23 DIAGNOSIS — R29.898 DECREASED STRENGTH OF LOWER EXTREMITY: ICD-10-CM

## 2020-11-23 DIAGNOSIS — R26.9 GAIT ABNORMALITY: ICD-10-CM

## 2020-11-23 PROCEDURE — 97110 THERAPEUTIC EXERCISES: CPT | Mod: PO

## 2020-11-23 PROCEDURE — 97116 GAIT TRAINING THERAPY: CPT | Mod: PO

## 2020-11-23 NOTE — PROGRESS NOTES
"  Physical Therapy Treatment Note     Name: Yanni Car  Clinic Number: [id number]    Therapy Diagnosis:   Encounter Diagnoses   Name Primary?    Decreased strength of lower extremity     Gait abnormality      Physician: Rajni No MD    Visit Date: 11/23/2020    Physician Orders: PT Eval and Treat   Medical Diagnosis from Referral: S72.002A,S32.82XA (ICD-10-CM) - Traumatic fracture of multiple bones of left hip and pelvis  Evaluation Date: 11/2/2020  Authorization Period Expiration: 1/02/2021  Plan of Care Expiration: 1/1/2021  Visit # / Visits authorized: 7/ 12      Time In: 3:15 pm  Time Out: 4:00 pm  Total Billable Time: 45 minutes    Precautions: Standard and Fall    Subjective     Pt reports: she is not having hip pain today, and has improving strength and endurance with treatment.  She was compliant with home exercise program.  Response to previous treatment: favorable- felt fine after  Functional change: none yet    Pain: 0/10   Location: left hip        Objective     Yanni received therapeutic exercises to develop strength and balance for 32 minutes including:    Date  11/23/2020 11/18/2020 11/16/2020 11/11/2020 11/9/2020 11/4/2020   VISIT 7/12 6/12 5/12 4/12 3/12 2/12   FOTO --  5/5 4/5 3/5 2/5   POC EXP. DATE 1/1/2021 1/1/2021       VISIT AMOUNT  MEDICARE TOTAL 90.62  90.96  446.66 90.96  355.70 90.96  264.74 90.96  173.78   FACE-TO-FACE 12/2/2020 12/2/2020 12/2/2020 12/2/2020 12/2/2020 12/2/2020            TABLE:         bridges 2 x 10 2 x 10 2 x 10 2 x 10 2 x 10 2 x 10   clams 2 x 10 GTB  2 x 10 RTB supine 2 x 10 RTB supine 2 x 10 YTB supine 2 x 10 YTB supine   SLR 3 x 10 x 1.5# 2 x 10 1.5# 2 x 10 1.5# 2 x 10 1# 2 x 10 1# 1 x 10   Pelvic tilts 20 x 3"  20x 3" 20x 3" 20x 3"    Pelvic tilts with marching 1 x 15  1 x 10                                 SEATED:         LAQ 2 x 10 x 1.5#   2 x 10 1.5# 2 x 10 B 1# 2 x 10 B   Hamstring curls 2 x 10 GTB   2 x 10 GTB B 2 x 10 RTB B 2 x 10 RTB B " "  Marching  -- 2 x 10  2 x 10 2 x 10 2 x 10            STANDING:         Marching in parallel bars 4 x 8 ft //bars 2x 8ft in // bars 2 x 10 2 x 10 2 x 10 2 x 10   SLS Not today 4x B 3x B 4x B 4x B 3x    Side stepping 4 x 8 ft //bars   4 x 8" //bars 4 x 8" //bars    Tandem stance Not today 3 x 15" B 3 x 15" B 3 x 15" B 3x 15" B    Walking w/wide ELIJAH Not today   4 x 8" //bars 4 x 8" //bars    Initials MA LT LT LT LT LT     Gait training for 15 mins: ambulating with SPC- required multiple cues for step through gait, proper sequencing and keeping the cane close to the body, and to pick feet up while ambulating.  1 x 78 ft and 1 x 54 ft with SBA from therapist,    Functional limitation reporting disability percentage was obtained through use of FOTO Hip Survey indicating 43% disability     Home Exercises Provided and Patient Education Provided     Education provided:   - Ambulating with wide ELIJAH to provide more stability while walking.    Written Home Exercises Provided: no.  Exercises were reviewed and Yanni was able to demonstrate them prior to the end of the session.  Yanni demonstrated good  understanding of the education provided.     See EMR under none for exercises provided printer broken, will provide exercises at future visit..    Assessment     Yanni is progressing with LE strengthening exercises as well as demonstrating improved endurance during gait training.  She continues to require verbal cues for step through gait and proper sequencing during gait training.  Pt did not have time to complete all exercises on this date due to FOTO survey and will resume single leg stance and tandem stance standing balance exercises next visit.  Yanni is progressing well towards her goals.   Pt prognosis is Good.     Pt will continue to benefit from skilled outpatient physical therapy to address the deficits listed in the problem list box on initial evaluation, provide pt/family education and to maximize pt's level " of independence in the home and community environment.     Pt's spiritual, cultural and educational needs considered and pt agreeable to plan of care and goals.     Anticipated barriers to physical therapy: none    Goals: Short Term Goals: 4 weeks   1. Pt will understand and be compliant with HEP to promote independent management of current diagnosis.  2. Pt will have increased hip flexion strength to at least 4/5 to decrease shuffling steps during gait and increase safety with ambulation.  3. Pt will be able to ambulate safely with RW for at least 50 ft with no pain to improve ability to ambulate around house.     Long Term Goals: 8 weeks   1. Pt will have decreased FOTO limitation score to demonstrate improved functional mobility.  2. Pt will have increased general LE strength to 5/5 to improve ability to perform sit to stand transfers and improve safety with activities such as toileting.   3. Pt will be able to ambulate safely with least restrictive AD for at least 100 ft to increase ability to ambulate around the house.  4. Pt will have increased hip AROM to at least 95 deg to improve ability to flex hip while ambulating to decrease shuffling steps and improve safety.    Plan     Continue with LE strengthening, gait, and balance as pt tolerates. Ambulating with proper sequencing with SPC.    Omega Gutierrez, PT

## 2020-11-25 ENCOUNTER — CLINICAL SUPPORT (OUTPATIENT)
Dept: REHABILITATION | Facility: HOSPITAL | Age: 80
End: 2020-11-25
Payer: MEDICARE

## 2020-11-25 DIAGNOSIS — R29.898 DECREASED STRENGTH OF LOWER EXTREMITY: ICD-10-CM

## 2020-11-25 DIAGNOSIS — R26.9 GAIT ABNORMALITY: ICD-10-CM

## 2020-11-25 PROCEDURE — 97110 THERAPEUTIC EXERCISES: CPT | Mod: PO

## 2020-11-25 NOTE — PROGRESS NOTES
Physical Therapy Treatment Note     Name: Yanni Car  Clinic Number: [id number]    Therapy Diagnosis:   Encounter Diagnoses   Name Primary?    Decreased strength of lower extremity     Gait abnormality      Physician: Rajni No MD    Visit Date: 11/25/2020    Physician Orders: PT Eval and Treat   Medical Diagnosis from Referral: S72.002A,S32.82XA (ICD-10-CM) - Traumatic fracture of multiple bones of left hip and pelvis  Evaluation Date: 11/2/2020  Authorization Period Expiration: 1/02/2021  Plan of Care Expiration: 1/1/2021  Visit # / Visits authorized: 8/ 12      Time In: 1:45 pm  Time Out: 2:30 pm  Total Billable Time: 45 minutes    Precautions: Standard and Fall    Subjective     Pt reports: has been able to use cane around the house and has also tried walking without an AD with cabinets for support as needed. No LOB and pt has felt steady doing this at home.  She was compliant with home exercise program.  Response to previous treatment: favorable- felt fine after  Functional change: Able to ambulate around the house with her SPC and even occasionally without AD holding onto furniture as needed.     Pain: 0/10   Location: left hip        Objective     Yanni received therapeutic exercises to develop strength and balance for 32 minutes including:    Date  11/25/2020 11/23/2020 11/18/2020 11/16/2020 11/11/2020 11/9/2020 11/4/2020   VISIT 8/12 7/12 6/12 5/12 4/12 3/12 2/12   FOTO  --  5/5 4/5 3/5 2/5   POC EXP. DATE 1/1/2021 1/1/2021 1/1/2021       VISIT AMOUNT  MEDICARE TOTAL 90.96 90.62  90.96  446.66 90.96  355.70 90.96  264.74 90.96  173.78   FACE-TO-FACE 12/2/2020 12/2/2020 12/2/2020 12/2/2020 12/2/2020 12/2/2020 12/2/2020             TABLE:          bridges 2 x 10 2 x 10 2 x 10 2 x 10 2 x 10 2 x 10 2 x 10   clams 2 x 10 GTB 2 x 10 GTB  2 x 10 RTB supine 2 x 10 RTB supine 2 x 10 YTB supine 2 x 10 YTB supine   SLR 2 x 10 1.5# 3 x 10 x 1.5# 2 x 10 1.5# 2 x 10 1.5# 2 x 10 1# 2 x 10 1# 1 x 10   Pelvic  "tilts 20x 3" 20 x 3"  20x 3" 20x 3" 20x 3"    Pelvic tilts with marching 2 x 10 1 x 15  1 x 10                                    SEATED:          LAQ 2 x 10 1.5# 2 x 10 x 1.5#   2 x 10 1.5# 2 x 10 B 1# 2 x 10 B   Hamstring curls 1 x 10 GTB 2 x 10 GTB   2 x 10 GTB B 2 x 10 RTB B 2 x 10 RTB B   Marching   -- 2 x 10  2 x 10 2 x 10 2 x 10             STANDING:          Marching in parallel bars 4 x 8 ft // bars 4 x 8 ft //bars 2x 8ft in // bars 2 x 10 2 x 10 2 x 10 2 x 10   SLS 3x B Not today 4x B 3x B 4x B 4x B 3x    Side stepping  4 x 8 ft //bars   4 x 8" //bars 4 x 8" //bars    Tandem stance 3 x 15" B Not today 3 x 15" B 3 x 15" B 3 x 15" B 3x 15" B    Walking w/wide ELIJAH  Not today   4 x 8" //bars 4 x 8" //bars    Initials  MA LT LT LT LT LT     Gait training for 15 mins: ambulating with SPC- required minimal cues for proper sequencing and keeping the cane close to the body, and to pick feet up while ambulating. Began ambulating with step through pattern today with SPC.  125 ft with SBA from therapist,     Functional limitation reporting disability percentage was obtained through use of FOTO Hip Survey indicating 43% disability     Home Exercises Provided and Patient Education Provided     Education provided:   - Ambulating with wide ELIJAH to provide more stability while walking.    Written Home Exercises Provided: no.  Exercises were reviewed and Yanni was able to demonstrate them prior to the end of the session.  Yanni demonstrated good  understanding of the education provided.     See EMR under none for exercises provided printer broken, will provide exercises at future visit..    Assessment     Yanni's single leg and tandem balance remains limited, though is showing improvements from the first attempts several visits ago. Required very minimal cueing while ambulating with SPC today for proper sequencing, though forgot proper sequencing if attempting to dual task by talking with therapist. Began walking with " a step to gait pattern, and was ambulating quicker than she has in the past. No LOB and pt reported feeling steady with this.   Yanni is progressing well towards her goals.   Pt prognosis is Good.     Pt will continue to benefit from skilled outpatient physical therapy to address the deficits listed in the problem list box on initial evaluation, provide pt/family education and to maximize pt's level of independence in the home and community environment.     Pt's spiritual, cultural and educational needs considered and pt agreeable to plan of care and goals.     Anticipated barriers to physical therapy: none    Goals: Short Term Goals: 4 weeks   1. Pt will understand and be compliant with HEP to promote independent management of current diagnosis.  2. Pt will have increased hip flexion strength to at least 4/5 to decrease shuffling steps during gait and increase safety with ambulation.  3. Pt will be able to ambulate safely with RW for at least 50 ft with no pain to improve ability to ambulate around house.     Long Term Goals: 8 weeks   1. Pt will have decreased FOTO limitation score to demonstrate improved functional mobility.  2. Pt will have increased general LE strength to 5/5 to improve ability to perform sit to stand transfers and improve safety with activities such as toileting.   3. Pt will be able to ambulate safely with least restrictive AD for at least 100 ft to increase ability to ambulate around the house.  4. Pt will have increased hip AROM to at least 95 deg to improve ability to flex hip while ambulating to decrease shuffling steps and improve safety.    Plan     Continue with LE strengthening, gait, and balance as pt tolerates.    Kailyn Vallecillo, PT

## 2020-12-01 ENCOUNTER — CLINICAL SUPPORT (OUTPATIENT)
Dept: REHABILITATION | Facility: HOSPITAL | Age: 80
End: 2020-12-01
Payer: MEDICARE

## 2020-12-01 DIAGNOSIS — R29.898 DECREASED STRENGTH OF LOWER EXTREMITY: ICD-10-CM

## 2020-12-01 DIAGNOSIS — R26.9 GAIT ABNORMALITY: ICD-10-CM

## 2020-12-01 PROCEDURE — 97116 GAIT TRAINING THERAPY: CPT | Mod: PO

## 2020-12-01 PROCEDURE — 97110 THERAPEUTIC EXERCISES: CPT | Mod: PO

## 2020-12-01 NOTE — PROGRESS NOTES
"  Physical Therapy Treatment Note     Name: Yanni Car  Clinic Number: [id number]    Therapy Diagnosis:   Encounter Diagnoses   Name Primary?    Decreased strength of lower extremity     Gait abnormality      Physician: Rajni No MD    Visit Date: 12/1/2020    Physician Orders: PT Eval and Treat   Medical Diagnosis from Referral: S72.002A,S32.82XA (ICD-10-CM) - Traumatic fracture of multiple bones of left hip and pelvis  Evaluation Date: 11/2/2020  Authorization Period Expiration: 1/02/2021  Plan of Care Expiration: 1/1/2021  Visit # / Visits authorized: 9/ 12      Time In: 1:00 pm  Time Out: 1:45 pm  Total Billable Time: 45 minutes    Precautions: Standard and Fall    Subjective     Pt reports: no pain today, walked around yesterday in her kitchen without anything  She was compliant with home exercise program.  Response to previous treatment: felt ok, no increase soreness  Functional change: Able to ambulate around the house with her SPC and even occasionally without AD holding onto furniture as needed.     Pain: 0/10   Location: left hip        Objective     Yanni received therapeutic exercises to develop strength and balance for 37 minutes including:    Date  12/01/2020 11/25/2020 11/23/2020 11/18/2020 11/16/2020 11/11/2020 11/9/2020 11/4/2020   VISIT 9/12 8/12 7/12 6/12 5/12 4/12 3/12 2/12   FOTO --  --  5/5 4/5 3/5 2/5   POC EXP. DATE 01/01/2021 1/1/2021 1/1/2021 1/1/2021       VISIT AMOUNT  MEDICARE TOTAL 90.62   90.96 90.62  90.96  446.66 90.96  355.70 90.96  264.74 90.96  173.78   FACE-TO-FACE 12/02/2020 12/2/2020 12/2/2020 12/2/2020 12/2/2020 12/2/2020 12/2/2020 12/2/2020              TABLE:           bridges 3 x 10 2 x 10 2 x 10 2 x 10 2 x 10 2 x 10 2 x 10 2 x 10   clams 2 x 10 GTB 2 x 10 GTB 2 x 10 GTB  2 x 10 RTB supine 2 x 10 RTB supine 2 x 10 YTB supine 2 x 10 YTB supine   SLR 2 x 10 x 1.5# 2 x 10 1.5# 3 x 10 x 1.5# 2 x 10 1.5# 2 x 10 1.5# 2 x 10 1# 2 x 10 1# 1 x 10   Pelvic tilts 20 x 3" " "20x 3" 20 x 3"  20x 3" 20x 3" 20x 3"    Pelvic tilts with marching 2 x 10 2 x 10 1 x 15  1 x 10                                       SEATED:           LAQ 3 x 1 0 x 1.5# 2 x 10 1.5# 2 x 10 x 1.5#   2 x 10 1.5# 2 x 10 B 1# 2 x 10 B   Hamstring curls 2 x 10 GTB 1 x 10 GTB 2 x 10 GTB   2 x 10 GTB B 2 x 10 RTB B 2 x 10 RTB B   Marching  Not today  -- 2 x 10  2 x 10 2 x 10 2 x 10              STANDING:           Marching in parallel bars 4 x 8 ft // bars 4 x 8 ft // bars 4 x 8 ft //bars 2x 8ft in // bars 2 x 10 2 x 10 2 x 10 2 x 10   SLS 3 x 30" B  3x B Not today 4x B 3x B 4x B 4x B 3x    Side stepping 4 x 8 ft //bars  4 x 8 ft //bars   4 x 8" //bars 4 x 8" //bars    Tandem stance 3x 20" B  3 x 15" B Not today 3 x 15" B 3 x 15" B 3 x 15" B 3x 15" B    Walking w/wide ELIJAH Not today  Not today   4 x 8" //bars 4 x 8" //bars    Initials DG  MA LT LT LT LT LT     Gait training for 8 mins: ambulating with SPC- required minimal cues for proper sequencing and keeping the cane close to the body, and to pick feet up while ambulating, 112 ft with SBA from therapist.    Home Exercises Provided and Patient Education Provided     Education provided:   - Ambulating with wide ELIJAH to provide more stability while walking.    Written Home Exercises Provided: no.  Exercises were reviewed and Yanni was able to demonstrate them prior to the end of the session.  Yanni demonstrated good  understanding of the education provided.     See EMR under none for exercises provided printer broken, will provide exercises at future visit..    Assessment     Yanni continues to use arm strategies, hip, and stepping strategies to maintain her balance with single leg stance and tandem stance. She was able to perform marching and side stepping in the parallel bars with no LOB, but required frequent verbal cues to relax her arms out of a high guard position. When ambulating with the SPC she continues to require verbal cues for proper sequencing and to " step through with her R LE. She was able to perform all of today's progressions with no increase in symptoms prior to leaving the clinic.   Yanni is progressing well towards her goals.   Pt prognosis is Good.     Pt will continue to benefit from skilled outpatient physical therapy to address the deficits listed in the problem list box on initial evaluation, provide pt/family education and to maximize pt's level of independence in the home and community environment.     Pt's spiritual, cultural and educational needs considered and pt agreeable to plan of care and goals.     Anticipated barriers to physical therapy: none    Goals: Short Term Goals: 4 weeks   1. Pt will understand and be compliant with HEP to promote independent management of current diagnosis. In progress, not met  2. Pt will have increased hip flexion strength to at least 4/5 to decrease shuffling steps during gait and increase safety with ambulation. In progress, not met  3. Pt will be able to ambulate safely with RW for at least 50 ft with no pain to improve ability to ambulate around house. In progress, not met     Long Term Goals: 8 weeks   1. Pt will have decreased FOTO limitation score to demonstrate improved functional mobility. In progress, not met  2. Pt will have increased general LE strength to 5/5 to improve ability to perform sit to stand transfers and improve safety with activities such as toileting. In progress, not met  3. Pt will be able to ambulate safely with least restrictive AD for at least 100 ft to increase ability to ambulate around the house. In progress, not met  4. Pt will have increased hip AROM to at least 95 deg to improve ability to flex hip while ambulating to decrease shuffling steps and improve safety. In progress, not met    Plan     Continue with LE strengthening, gait, and balance as pt tolerates.    Radha Tirado, PT

## 2020-12-02 ENCOUNTER — CLINICAL SUPPORT (OUTPATIENT)
Dept: REHABILITATION | Facility: HOSPITAL | Age: 80
End: 2020-12-02
Payer: MEDICARE

## 2020-12-02 DIAGNOSIS — R26.9 GAIT ABNORMALITY: ICD-10-CM

## 2020-12-02 DIAGNOSIS — R29.898 DECREASED STRENGTH OF LOWER EXTREMITY: ICD-10-CM

## 2020-12-02 PROCEDURE — 97116 GAIT TRAINING THERAPY: CPT | Mod: PO

## 2020-12-02 PROCEDURE — 97110 THERAPEUTIC EXERCISES: CPT | Mod: PO

## 2020-12-02 NOTE — PROGRESS NOTES
Physical Therapy Treatment Note     Name: Yanni Car  Clinic Number: [id number]    Therapy Diagnosis:   Encounter Diagnoses   Name Primary?    Decreased strength of lower extremity     Gait abnormality      Physician: Rajni No MD    Visit Date: 12/2/2020    Physician Orders: PT Eval and Treat   Medical Diagnosis from Referral: S72.002A,S32.82XA (ICD-10-CM) - Traumatic fracture of multiple bones of left hip and pelvis  Evaluation Date: 11/2/2020  Authorization Period Expiration: 1/02/2021  Plan of Care Expiration: 1/1/2021  Visit # / Visits authorized: 10/ 12      Time In: 1:45 pm  Time Out: 2:30 pm  Total Billable Time: 45 minutes    Precautions: Standard and Fall    Subjective     Pt reports: she feels fine today and has been continuing to practice ambulating around her home without an AD.  She was compliant with home exercise program.  Response to previous treatment: felt ok, no increase soreness  Functional change: Able to ambulate around the house with her SPC and even occasionally without AD holding onto furniture as needed.     Pain: 0/10   Location: left hip        Objective     Yanni received therapeutic exercises to develop strength and balance for 30  minutes including:    Date  12/2/2020 12/01/2020 11/25/2020 11/23/2020 11/18/2020 11/16/2020 11/11/2020 11/9/2020 11/4/2020   VISIT 10/12 9/12 8/12 7/12 6/12 5/12 4/12 3/12 2/12   FOTO  --  --  5/5 4/5 3/5 2/5   POC EXP. DATE 01/01/2021 01/01/2021 1/1/2021 1/1/2021 1/1/2021       VISIT AMOUNT  MEDICARE TOTAL  90.62   90.96 90.62  90.96  446.66 90.96  355.70 90.96  264.74 90.96  173.78   FACE-TO-FACE 12/2/2020 12/02/2020 12/2/2020 12/2/2020 12/2/2020 12/2/2020 12/2/2020 12/2/2020 12/2/2020               TABLE:            bridges 3 x 10 3 x 10 2 x 10 2 x 10 2 x 10 2 x 10 2 x 10 2 x 10 2 x 10   clams  2 x 10 GTB 2 x 10 GTB 2 x 10 GTB  2 x 10 RTB supine 2 x 10 RTB supine 2 x 10 YTB supine 2 x 10 YTB supine   SLR 1 x 10 2# B 2 x 10 x 1.5# 2 x 10  "1.5# 3 x 10 x 1.5# 2 x 10 1.5# 2 x 10 1.5# 2 x 10 1# 2 x 10 1# 1 x 10   Pelvic tilts 20x 3" 20 x 3" 20x 3" 20 x 3"  20x 3" 20x 3" 20x 3"    Pelvic tilts with marching 2 x 10 2 x 10 2 x 10 1 x 15  1 x 10                                          SEATED:            LAQ 2 x 10 2# 3 x 1 0 x 1.5# 2 x 10 1.5# 2 x 10 x 1.5#   2 x 10 1.5# 2 x 10 B 1# 2 x 10 B   Hamstring curls  2 x 10 GTB 1 x 10 GTB 2 x 10 GTB   2 x 10 GTB B 2 x 10 RTB B 2 x 10 RTB B   Marching   Not today  -- 2 x 10  2 x 10 2 x 10 2 x 10               STANDING:            Marching in parallel bars 4 x 8 ft //bars 4 x 8 ft // bars 4 x 8 ft // bars 4 x 8 ft //bars 2x 8ft in // bars 2 x 10 2 x 10 2 x 10 2 x 10   SLS  3 x 30" B  3x B Not today 4x B 3x B 4x B 4x B 3x    Side stepping 4 x 8ft //bars 4 x 8 ft //bars  4 x 8 ft //bars   4 x 8" //bars 4 x 8" //bars    Tandem stance 3 x 20" B 3x 20" B  3 x 15" B Not today 3 x 15" B 3 x 15" B 3 x 15" B 3x 15" B    Steps  4x up and down           Walking w/wide ELIJAH  Not today  Not today   4 x 8" //bars 4 x 8" //bars    Initials LT DG  MA LT LT LT LT LT     Gait training for 15 mins: ambulating with SPC- required minimal cues for proper sequencing and keeping the cane close to the body, and to pick feet up while ambulating, 112 ft with SBA from therapist; 120 ft w/no AD. Pt took smaller steps, had a slower fran, and used a step to pattern to start when ambulating with no AD, but eventually began using a step through pattern.    Home Exercises Provided and Patient Education Provided     Education provided:   - Ambulating with wide ELIJAH to provide more stability while walking.    Written Home Exercises Provided: no.  Exercises were reviewed and Yanni was able to demonstrate them prior to the end of the session.  Yanni demonstrated good  understanding of the education provided.     See EMR under none for exercises provided printer broken, will provide exercises at future visit..    Assessment     Yanni ambulated " around the clinic today with no AD. She initially used a step to pattern and took small steps with a slow fran, but with practice she ambulated with a step through pattern- still with decreased step length and a slow fran. She had no LOB but continues to hold UEs up in a high guard position. Attempted going up and down the small staircase today as pt has about 5 steps to get into and out of front door. Pt reports a rail on the R side of the stairs and she usually holds onto 's hand on the L side. She was very hesitant to navigate the stairs today and would try to go up just holding the R side railing but would usually grab both rails. On the last attempt pt was convinced to try going up and down the stairs with the R sided railing and holding the cane in her L hand. No LOB while climbing stairs, but had difficulty eccentrically lowering herself down.  Yanni is progressing well towards her goals.   Pt prognosis is Good.     Pt will continue to benefit from skilled outpatient physical therapy to address the deficits listed in the problem list box on initial evaluation, provide pt/family education and to maximize pt's level of independence in the home and community environment.     Pt's spiritual, cultural and educational needs considered and pt agreeable to plan of care and goals.     Anticipated barriers to physical therapy: none    Goals: Short Term Goals: 4 weeks   1. Pt will understand and be compliant with HEP to promote independent management of current diagnosis. In progress, not met  2. Pt will have increased hip flexion strength to at least 4/5 to decrease shuffling steps during gait and increase safety with ambulation. In progress, not met  3. Pt will be able to ambulate safely with RW for at least 50 ft with no pain to improve ability to ambulate around house. In progress, not met     Long Term Goals: 8 weeks   1. Pt will have decreased FOTO limitation score to demonstrate improved functional  mobility. In progress, not met  2. Pt will have increased general LE strength to 5/5 to improve ability to perform sit to stand transfers and improve safety with activities such as toileting. In progress, not met  3. Pt will be able to ambulate safely with least restrictive AD for at least 100 ft to increase ability to ambulate around the house. In progress, not met  4. Pt will have increased hip AROM to at least 95 deg to improve ability to flex hip while ambulating to decrease shuffling steps and improve safety. In progress, not met    Plan     Continue with LE strengthening, gait, and balance as pt tolerates. Add step ups/step downs     Kailyn Vallecillo, PT, DPT

## 2020-12-04 ENCOUNTER — DOCUMENTATION ONLY (OUTPATIENT)
Dept: REHABILITATION | Facility: HOSPITAL | Age: 80
End: 2020-12-04

## 2020-12-04 NOTE — PROGRESS NOTES
Physical Therapy Treatment Note     Name: Yanni Car  Clinic Number: [id number]    Therapy Diagnosis:   Encounter Diagnoses   Name Primary?    Decreased strength of lower extremity Yes    Gait abnormality      Physician: Rajni No MD    Visit Date: 12/7/2020    Physician Orders: PT Eval and Treat   Medical Diagnosis from Referral: S72.002A,S32.82XA (ICD-10-CM) - Traumatic fracture of multiple bones of left hip and pelvis  Evaluation Date: 11/2/2020  Authorization Period Expiration: 1/02/2021  Plan of Care Expiration: 1/1/2021  Visit # / Visits authorized: 11 / 12      Time In: 1:15 pm  Time Out: 2:00 pm  Total Billable Time: 45 minutes    Precautions: Standard and Fall    Subjective     Pt reports: she feels fine today and has been continuing to practice ambulating around her home without an AD.  She was compliant with home exercise program.  Response to previous treatment: slight increase in soreness   Functional change: Able to ambulate around the house with her SPC and even occasionally without AD holding onto furniture as needed.     Pain: 0/10   Location: left hip        Objective     Yanni received therapeutic exercises to develop strength and balance for 25 minutes including:    Date  12/07/2020 12/2/2020 12/01/2020 11/25/2020 11/23/2020 11/18/2020 11/16/2020 11/11/2020 11/9/2020 11/4/2020   VISIT 11/12  01/02/2021 10/12 9/12 8/12 7/12 6/12 5/12 4/12 3/12 2/12   FOTO --  --  --  5/5 4/5 3/5 2/5   POC EXP. DATE 01/01/2021 01/01/2021 01/01/2021 1/1/2021 1/1/2021 1/1/2021       VISIT AMOUNT  MEDICARE TOTAL 90.62  960.76 90.62  870.14 90.62  779.52 90.96  688.90 90.62  597.94 60.64  507.30 90.96  446.66 90.96  355.70 90.96  264.74 90.96  173.78   FACE-TO-FACE 01/04/2020 12/2/2020 12/02/2020 12/2/2020 12/2/2020 12/2/2020 12/2/2020 12/2/2020 12/2/2020 12/2/2020                TABLE:             bridges NT 3 x 10 3 x 10 2 x 10 2 x 10 2 x 10 2 x 10 2 x 10 2 x 10 2 x 10   clams NT  2 x 10 GTB 2 x 10 GTB  "2 x 10 GTB  2 x 10 RTB supine 2 x 10 RTB supine 2 x 10 YTB supine 2 x 10 YTB supine   SLR NT 1 x 10 2# B 2 x 10 x 1.5# 2 x 10 1.5# 3 x 10 x 1.5# 2 x 10 1.5# 2 x 10 1.5# 2 x 10 1# 2 x 10 1# 1 x 10   Pelvic tilts NT 20x 3" 20 x 3" 20x 3" 20 x 3"  20x 3" 20x 3" 20x 3"    Pelvic tilts with marching NT 2 x 10 2 x 10 2 x 10 1 x 15  1 x 10                   SEATED:             LAQ NT 2 x 10 2# 3 x 1 0 x 1.5# 2 x 10 1.5# 2 x 10 x 1.5#   2 x 10 1.5# 2 x 10 B 1# 2 x 10 B   Hamstring curls NT  2 x 10 GTB 1 x 10 GTB 2 x 10 GTB   2 x 10 GTB B 2 x 10 RTB B 2 x 10 RTB B   Marching  NT  Not today  -- 2 x 10  2 x 10 2 x 10 2 x 10                STANDING:             Marching in parallel bars 4 x 8 ft // bars 4 x 8 ft //bars 4 x 8 ft // bars 4 x 8 ft // bars 4 x 8 ft //bars 2x 8ft in // bars 2 x 10 2 x 10 2 x 10 2 x 10   SLS NT  3 x 30" B  3x B Not today 4x B 3x B 4x B 4x B 3x    Side stepping 4 x 8 ft //bars 4 x 8ft //bars 4 x 8 ft //bars  4 x 8 ft //bars   4 x 8" //bars 4 x 8" //bars    Tandem stance 3 x 20" B 3 x 20" B 3x 20" B  3 x 15" B Not today 3 x 15" B 3 x 15" B 3 x 15" B 3x 15" B    Steps  4 x 3" step up and down R HR only 4x up and down           Walking w/wide ELIJAH NT  Not today  Not today   4 x 8" //bars 4 x 8" //bars    Eccentric step downs  Next                         Initials SB 1/6 LT DG  MA LT LT LT LT LT       Yanni participated in gait training to improve functional mobility and safety for 20 minutes, including:  - Ambulation with SPC x multiple trials (~40 ft ea) SBA and cueing for increased B step length, step sequence, step height, and AD positioning  - Ambulation around gym with no AD Christopher-CGA with/without single HHA  - Pre-gait training in // bars single UE support with stepping over obstacles for step height and length visual cues on floor x 4 trials ~ 8 ft  - Pre-gait training outside // bars w/ SPC and L HHA-no HHA support with stepping over obstacles for step height and length visual cues on floor x 4 " trials ~10 ft Christopher for balance       Home Exercises Provided and Patient Education Provided     Education provided:   - Ambulating with wide ELIJAH to provide more stability while walking.    Written Home Exercises Provided: no.  Exercises were reviewed and Yanni was able to demonstrate them prior to the end of the session.  Yanni demonstrated good  understanding of the education provided.     See EMR under Patient Instructions for exercises provided printer broken, will provide exercises at future visit..    Assessment     Yanni returns to therapy today ambulating with 4WW. Pt was able to complete all of today's activities well with focus on gait training and gait quality with good carryover noted when ambulating out of clinic. Pt was able to ambulate in clinic without HHA today, although required max encouragement to push past fear and anxiety. Pt performed stair training x 2 attempts without the use of B UE support ascending and descending, although required encouragement to do so. Pt demo'd slow fran, shortened step length, and poor step through with SPC at beginning of session, which improved with repetition and pre-gait training. Will attempt eccentric step down exercise in order to improve LE strength and balance with descending stairs.     Yanni is progressing well towards her goals.   Pt prognosis is Good.     Pt will continue to benefit from skilled outpatient physical therapy to address the deficits listed in the problem list box on initial evaluation, provide pt/family education and to maximize pt's level of independence in the home and community environment.     Pt's spiritual, cultural and educational needs considered and pt agreeable to plan of care and goals.     Anticipated barriers to physical therapy: none    Goals: Short Term Goals: 4 weeks   1. Pt will understand and be compliant with HEP to promote independent management of current diagnosis. In progress, not met  2. Pt will have  increased hip flexion strength to at least 4/5 to decrease shuffling steps during gait and increase safety with ambulation. In progress, not met  3. Pt will be able to ambulate safely with RW for at least 50 ft with no pain to improve ability to ambulate around house. In progress, not met     Long Term Goals: 8 weeks   1. Pt will have decreased FOTO limitation score to demonstrate improved functional mobility. In progress, not met  2. Pt will have increased general LE strength to 5/5 to improve ability to perform sit to stand transfers and improve safety with activities such as toileting. In progress, not met  3. Pt will be able to ambulate safely with least restrictive AD for at least 100 ft to increase ability to ambulate around the house. In progress, not met  4. Pt will have increased hip AROM to at least 95 deg to improve ability to flex hip while ambulating to decrease shuffling steps and improve safety. In progress, not met    Plan     Continue with LE strengthening, gait, and balance as pt tolerates. Add eccentric step downs next visit.     Ana Laura Scherer, PTA

## 2020-12-04 NOTE — PROGRESS NOTES
Face to Face PTA Conference performed with Kailyn Vallecillo DPT regarding patient's current status, overall progress, and plan of care. Pt will be seen by a physical therapist minimally every 6th visit or every 30 days.    Ana Laura Scherer, PTA  12/4/2020    PT/PTA met face to face to discuss pt's treatment plan and progress towards established goals. Pt will be seen by a physical therapist minimally every 6th visit or every 30 days.    Kailyn Vallecillo PT, DPT  12/8/2020

## 2020-12-07 ENCOUNTER — CLINICAL SUPPORT (OUTPATIENT)
Dept: REHABILITATION | Facility: HOSPITAL | Age: 80
End: 2020-12-07
Payer: MEDICARE

## 2020-12-07 DIAGNOSIS — R26.9 GAIT ABNORMALITY: ICD-10-CM

## 2020-12-07 DIAGNOSIS — R29.898 DECREASED STRENGTH OF LOWER EXTREMITY: Primary | ICD-10-CM

## 2020-12-07 PROCEDURE — 97110 THERAPEUTIC EXERCISES: CPT | Mod: PO,CQ

## 2020-12-07 PROCEDURE — 97116 GAIT TRAINING THERAPY: CPT | Mod: PO,CQ

## 2020-12-09 ENCOUNTER — CLINICAL SUPPORT (OUTPATIENT)
Dept: REHABILITATION | Facility: HOSPITAL | Age: 80
End: 2020-12-09
Payer: MEDICARE

## 2020-12-09 DIAGNOSIS — R29.898 DECREASED STRENGTH OF LOWER EXTREMITY: ICD-10-CM

## 2020-12-09 DIAGNOSIS — R26.9 GAIT ABNORMALITY: ICD-10-CM

## 2020-12-09 PROCEDURE — 97110 THERAPEUTIC EXERCISES: CPT | Mod: PO

## 2020-12-09 NOTE — PROGRESS NOTES
Physical Therapy Treatment Note     Name: Yanni Car  Clinic Number: [id number]    Therapy Diagnosis:   Encounter Diagnoses   Name Primary?    Decreased strength of lower extremity     Gait abnormality      Physician: Rajni No MD    Visit Date: 12/9/2020    Physician Orders: PT Eval and Treat   Medical Diagnosis from Referral: S72.002A,S32.82XA (ICD-10-CM) - Traumatic fracture of multiple bones of left hip and pelvis  Evaluation Date: 11/2/2020  Authorization Period Expiration: 1/02/2021  Plan of Care Expiration: 1/1/2021  Visit # / Visits authorized: 12 / 12      Time In: 1:00 pm  Time Out: 1:45 pm  Total Billable Time: 45 minutes    Precautions: Standard and Fall    Subjective     Pt reports: she has not been ambulating with cane while at home, and continues to use walker or furniture for support.  She continues to report fear of falling.    She was compliant with home exercise program.  Response to previous treatment: slight increase in soreness   Functional change: Able to ambulate around the house with her SPC and even occasionally without AD holding onto furniture as needed.     Pain: 0/10   Location: left hip        Objective     Yanni received therapeutic exercises to develop strength and balance for 35 minutes including:    Date  12/9/2020 12/07/2020 12/2/2020 12/01/2020 11/25/2020 11/23/2020 11/18/2020 11/16/2020 11/11/2020 11/9/2020 11/4/2020   VISIT 12/12  01/02/2021 11/12  01/02/2021 10/12 9/12 8/12 7/12 6/12 5/12 4/12 3/12 2/12   FOTO  --  --  --  5/5 4/5 3/5 2/5   POC EXP. DATE 01/1/2021 01/01/2021 01/01/2021 01/01/2021 1/1/2021 1/1/2021 1/1/2021       VISIT AMOUNT  MEDICARE TOTAL  90.62  960.76 90.62  870.14 90.62  779.52 90.96  688.90 90.62  597.94 60.64  507.30 90.96  446.66 90.96  355.70 90.96  264.74 90.96  173.78   FACE-TO-FACE 01/4/2020 01/04/2020 12/2/2020 12/02/2020 12/2/2020 12/2/2020 12/2/2020 12/2/2020 12/2/2020 12/2/2020 12/2/2020                 TABLE:             "  bridges 3 x 10 NT 3 x 10 3 x 10 2 x 10 2 x 10 2 x 10 2 x 10 2 x 10 2 x 10 2 x 10   clams 3 x 10 BTB NT  2 x 10 GTB 2 x 10 GTB 2 x 10 GTB  2 x 10 RTB supine 2 x 10 RTB supine 2 x 10 YTB supine 2 x 10 YTB supine   SLR 2 x 10 x 2# B NT 1 x 10 2# B 2 x 10 x 1.5# 2 x 10 1.5# 3 x 10 x 1.5# 2 x 10 1.5# 2 x 10 1.5# 2 x 10 1# 2 x 10 1# 1 x 10   Pelvic tilts 20 x 3" NT 20x 3" 20 x 3" 20x 3" 20 x 3"  20x 3" 20x 3" 20x 3"    Pelvic tilts with marching 2 x 10 NT 2 x 10 2 x 10 2 x 10 1 x 15  1 x 10                    SEATED:              LAQ 2 x 10 x 2# NT 2 x 10 2# 3 x 1 0 x 1.5# 2 x 10 1.5# 2 x 10 x 1.5#   2 x 10 1.5# 2 x 10 B 1# 2 x 10 B   Hamstring curls 2 x 10 GTB NT  2 x 10 GTB 1 x 10 GTB 2 x 10 GTB   2 x 10 GTB B 2 x 10 RTB B 2 x 10 RTB B   Marching  -- NT  Not today  -- 2 x 10  2 x 10 2 x 10 2 x 10                 STANDING:              Marching in parallel bars 4 x 8 ft // bars 4 x 8 ft // bars 4 x 8 ft //bars 4 x 8 ft // bars 4 x 8 ft // bars 4 x 8 ft //bars 2x 8ft in // bars 2 x 10 2 x 10 2 x 10 2 x 10   SLS  NT  3 x 30" B  3x B Not today 4x B 3x B 4x B 4x B 3x    Side stepping 4 x 8 ft // bars 4 x 8 ft //bars 4 x 8ft //bars 4 x 8 ft //bars  4 x 8 ft //bars   4 x 8" //bars 4 x 8" //bars    Tandem stance 3 x 20" B 3 x 20" B 3 x 20" B 3x 20" B  3 x 15" B Not today 3 x 15" B 3 x 15" B 3 x 15" B 3x 15" B    Steps  4 x 3" step up and down R HR only 4 x 3" step up and down R HR only 4x up and down           Walking w/wide ELIJAH  NT  Not today  Not today   4 x 8" //bars 4 x 8" //bars    Eccentric step downs   Next                          Initials MA SB 1/6 LT DG  MA LT LT LT LT LT       Yanni participated in gait training to improve functional mobility and safety for 0 minutes, including:  - Ambulation with SPC x multiple trials (~40 ft ea) SBA and cueing for increased B step length, step sequence, step height, and AD positioning  - Ambulation around gym with no AD Christopher-CGA with/without single HHA  - Pre-gait training in " // bars single UE support with stepping over obstacles for step height and length visual cues on floor x 4 trials ~ 8 ft  - Pre-gait training outside // bars w/ SPC and L HHA-no HHA support with stepping over obstacles for step height and length visual cues on floor x 4 trials ~10 ft Christopher for balance     Hip Range of Motion:    Left active Left Passive Right active  Right Passive   Flexion 90 NT 95 NT   Ext. Rotation 40 NT 30 NT   Int. Rotation 22 NT 28 NT                  Lower Extremity Strength  Right LE   Left LE     Knee extension: 5/5 Knee extension: 4+/5   Knee flexion: 4+/5 Knee flexion: 4+/5   Hip flexion: 4-/5 Hip flexion: 3+/5   Hip Internal Rotation:  4+/5    Hip Internal Rotation: 4+/5      Hip External Rotation: 4+/5    Hip External Rotation: 4/5      Hip extension:  NT Hip extension: NT   Hip abduction: 4-/5 Hip abduction: 4-/5   Hip adduction: NT Hip adduction NT   Ankle dorsiflexion: 5/5 Ankle dorsiflexion: 5/5   Ankle plantarflexion: NT Ankle plantarflexion: NT           Home Exercises Provided and Patient Education Provided     Education provided:   - Ambulating with wide ELIJAH to provide more stability while walking.    Written Home Exercises Provided: no.  Exercises were reviewed and Yanni was able to demonstrate them prior to the end of the session.  Yanni demonstrated good  understanding of the education provided.     See EMR under Patient Instructions for exercises provided printer broken, will provide exercises at future visit..    Assessment     Yanni continues to have weakness in bilateral LE musculature, decreased standing balance, causing difficulty with walking and ascending/descending stairs.  Pt would benefit from continued therapy to progress LE strengthening, core stabilization, balance and gait training to improve functional mobility and decrease fall risk.    Yanni is progressing well towards her goals.   Pt prognosis is Good.     Pt will continue to benefit from skilled  outpatient physical therapy to address the deficits listed in the problem list box on initial evaluation, provide pt/family education and to maximize pt's level of independence in the home and community environment.     Pt's spiritual, cultural and educational needs considered and pt agreeable to plan of care and goals.     Anticipated barriers to physical therapy: none    Goals: Short Term Goals: 4 weeks   1. Pt will understand and be compliant with HEP to promote independent management of current diagnosis. In progress, not met  2. Pt will have increased hip flexion strength to at least 4/5 to decrease shuffling steps during gait and increase safety with ambulation. In progress, not met  3. Pt will be able to ambulate safely with RW for at least 50 ft with no pain to improve ability to ambulate around house. In progress, not met     Long Term Goals: 8 weeks   1. Pt will have decreased FOTO limitation score to demonstrate improved functional mobility. In progress, not met  2. Pt will have increased general LE strength to 5/5 to improve ability to perform sit to stand transfers and improve safety with activities such as toileting. In progress, not met  3. Pt will be able to ambulate safely with least restrictive AD for at least 100 ft to increase ability to ambulate around the house. In progress, not met  4. Pt will have increased hip AROM to at least 95 deg to improve ability to flex hip while ambulating to decrease shuffling steps and improve safety. In progress, not met    Plan     Continue with LE strengthening, gait, and balance as pt tolerates. Add eccentric step downs next visit.     Omega Gutierrez, PT

## 2020-12-09 NOTE — PLAN OF CARE
Outpatient Therapy Updated Plan of Care     Visit Date: 12/9/2020  Name: Yanni Car  Clinic Number: [id number]    Therapy Diagnosis:   Encounter Diagnoses   Name Primary?    Decreased strength of lower extremity     Gait abnormality      Physician: Rajni No MD    Physician Orders: PT Eval and Treat   Medical Diagnosis from Referral: S72.002A,S32.82XA (ICD-10-CM) - Traumatic fracture of multiple bones of left hip and pelvis  Evaluation Date: 11/2/2020    Total Visits Received: 12  Cancelled Visits: 0  No Show Visits: 0    Current Certification Period:  11/2/2020 to 1/1/2021  Precautions:  Standard and fall  Visits from Evaluation Date:  11  Functional Level Prior to Evaluation:  independent    Subjective     Update: she has not been ambulating with cane while at home, and continues to use walker or furniture for support.  She continues to report fear of falling.     Objective     Update:   Hip Range of Motion:    Left active Left Passive Right active  Right Passive   Flexion 90 NT 95 NT   Ext. Rotation 40 NT 30 NT   Int. Rotation 22 NT 28 NT                  Lower Extremity Strength  Right LE   Left LE     Knee extension: 5/5 Knee extension: 4+/5   Knee flexion: 4+/5 Knee flexion: 4+/5   Hip flexion: 4-/5 Hip flexion: 3+/5   Hip Internal Rotation:  4+/5    Hip Internal Rotation: 4+/5      Hip External Rotation: 4+/5    Hip External Rotation: 4/5      Hip extension:  NT Hip extension: NT   Hip abduction: 4-/5 Hip abduction: 4-/5   Hip adduction: NT Hip adduction NT   Ankle dorsiflexion: 5/5 Ankle dorsiflexion: 5/5   Ankle plantarflexion: NT Ankle plantarflexion: NT          Assessment     Update: Yanni continues to have weakness in bilateral LE musculature, decreased standing balance, causing difficulty with walking and ascending/descending stairs.  Pt would benefit from continued therapy to progress LE strengthening, core stabilization, balance and gait training to improve functional mobility and  decrease fall risk.    Previous Short Term Goals Status:     Goals: Short Term Goals: 4 weeks   1. Pt will understand and be compliant with HEP to promote independent management of current diagnosis. In progress, not met  2. Pt will have increased hip flexion strength to at least 4/5 to decrease shuffling steps during gait and increase safety with ambulation. In progress, not met  3. Pt will be able to ambulate safely with RW for at least 50 ft with no pain to improve ability to ambulate around house. In progress, not met     Long Term Goals: 8 weeks   1. Pt will have decreased FOTO limitation score to demonstrate improved functional mobility. In progress, not met  2. Pt will have increased general LE strength to 5/5 to improve ability to perform sit to stand transfers and improve safety with activities such as toileting. In progress, not met  3. Pt will be able to ambulate safely with least restrictive AD for at least 100 ft to increase ability to ambulate around the house. In progress, not met  4. Pt will have increased hip AROM to at least 95 deg to improve ability to flex hip while ambulating to decrease shuffling steps and improve safety. In progress, not met  New Short Term Goals Status:   continue  Long Term Goal Status:   continue per initial plan of care.  Reasons for Recertification of Therapy:   Continued strength and balance deficits    Plan     Updated Certification Period: 12/9/2020 to 02/9/2021  Recommended Treatment Plan: 2 times per week for 8 weeks: Gait Training, Manual Therapy, Moist Heat/ Ice, Neuromuscular Re-ed, Patient Education, Therapeutic Activites and Therapeutic Exercise  Other Recommendations: none    Omega Gutierrez, PT  12/9/2020      I CERTIFY THE NEED FOR THESE SERVICES FURNISHED UNDER THIS PLAN OF TREATMENT AND WHILE UNDER MY CARE    Physician's comments:        Physician's Signature: ___________________________________________________

## 2020-12-14 ENCOUNTER — CLINICAL SUPPORT (OUTPATIENT)
Dept: REHABILITATION | Facility: HOSPITAL | Age: 80
End: 2020-12-14
Payer: MEDICARE

## 2020-12-14 DIAGNOSIS — R26.9 GAIT ABNORMALITY: ICD-10-CM

## 2020-12-14 DIAGNOSIS — R29.898 DECREASED STRENGTH OF LOWER EXTREMITY: ICD-10-CM

## 2020-12-14 NOTE — PROGRESS NOTES
No charges were dropped today, as patient elected to wait until insurance authorization was received for additional visits. Today's visit will be canceled due to insurance issues.     Radha Tirado, PT

## 2020-12-22 NOTE — PROGRESS NOTES
Physical Therapy Treatment Note     Name: Yanni Car  Clinic Number: [id number]    Therapy Diagnosis:   Encounter Diagnoses   Name Primary?    Decreased strength of lower extremity     Gait abnormality      Physician: Rajni No MD    Visit Date: 12/23/2020    Physician Orders: PT Eval and Treat   Medical Diagnosis from Referral: S72.002A,S32.82XA (ICD-10-CM) - Traumatic fracture of multiple bones of left hip and pelvis  Evaluation Date: 11/2/2020  Authorization Period Expiration: 1/02/2021  Plan of Care Expiration: 1/1/2021  Visit # / Visits authorized: 13 / 24      Time In: 2:25 pm  Time Out: 3:10 pm  Total Billable Time: 45 minutes    Precautions: Standard and Fall    Subjective     Pt reports: being able to ambulate around her house with no AD and no LOB  She was compliant with home exercise program.  Response to previous treatment: slight increase in soreness   Functional change: Able to ambulate around the house with no AD    Pain: 0/10   Location: left hip        Objective     Yanni received therapeutic exercises to develop strength and balance for 40 minutes including:    Date  12/23/2020 12/9/2020 12/07/2020 12/2/2020 12/01/2020 11/25/2020 11/23/2020 11/18/2020 11/16/2020 11/11/2020 11/9/2020 11/4/2020   VISIT 13/24  01/02/2021 12/12  01/02/2021 11/12  01/02/2021 10/12 9/12 8/12 7/12 6/12 5/12 4/12 3/12 2/12   FOTO   --  --  --  5/5 4/5 3/5 2/5   POC EXP. DATE 01/1/2021 01/1/2021 01/01/2021 01/01/2021 01/01/2021 1/1/2021 1/1/2021 1/1/2021       VISIT AMOUNT  MEDICARE TOTAL   90.62  960.76 90.62  870.14 90.62  779.52 90.96  688.90 90.62  597.94 60.64  507.30 90.96  446.66 90.96  355.70 90.96  264.74 90.96  173.78   FACE-TO-FACE 01/4/20202020 01/4/2020 01/04/2020 12/2/2020 12/02/2020 12/2/2020 12/2/2020 12/2/2020 12/2/2020 12/2/2020 12/2/2020 12/2/2020                  TABLE:               bridges  3 x 10 NT 3 x 10 3 x 10 2 x 10 2 x 10 2 x 10 2 x 10 2 x 10 2 x 10 2 x 10   clams  3 x 10 BTB NT  2 x  "10 GTB 2 x 10 GTB 2 x 10 GTB  2 x 10 RTB supine 2 x 10 RTB supine 2 x 10 YTB supine 2 x 10 YTB supine   SLR  2 x 10 x 2# B NT 1 x 10 2# B 2 x 10 x 1.5# 2 x 10 1.5# 3 x 10 x 1.5# 2 x 10 1.5# 2 x 10 1.5# 2 x 10 1# 2 x 10 1# 1 x 10   Pelvic tilts  20 x 3" NT 20x 3" 20 x 3" 20x 3" 20 x 3"  20x 3" 20x 3" 20x 3"    Pelvic tilts with marching  2 x 10 NT 2 x 10 2 x 10 2 x 10 1 x 15  1 x 10                     SEATED:               LAQ  2 x 10 x 2# NT 2 x 10 2# 3 x 1 0 x 1.5# 2 x 10 1.5# 2 x 10 x 1.5#   2 x 10 1.5# 2 x 10 B 1# 2 x 10 B   Hamstring curls  2 x 10 GTB NT  2 x 10 GTB 1 x 10 GTB 2 x 10 GTB   2 x 10 GTB B 2 x 10 RTB B 2 x 10 RTB B   Marching   -- NT  Not today  -- 2 x 10  2 x 10 2 x 10 2 x 10   Sit to Stand 5x 18 in chair  5x 17 in chair              STANDING:               Marching gait 2 x 15 ft  4 x 8 ft // bars 4 x 8 ft // bars 4 x 8 ft //bars 4 x 8 ft // bars 4 x 8 ft // bars 4 x 8 ft //bars 2x 8ft in // bars 2 x 10 2 x 10 2 x 10 2 x 10   SLS   NT  3 x 30" B  3x B Not today 4x B 3x B 4x B 4x B 3x    Side stepping 2 x 15 ft 4 x 8 ft // bars 4 x 8 ft //bars 4 x 8ft //bars 4 x 8 ft //bars  4 x 8 ft //bars   4 x 8" //bars 4 x 8" //bars    Tandem stance  3 x 20" B 3 x 20" B 3 x 20" B 3x 20" B  3 x 15" B Not today 3 x 15" B 3 x 15" B 3 x 15" B 3x 15" B    Steps  5x up & down both sides staircase R HR only 4 x 3" step up and down R HR only 4 x 3" step up and down R HR only 4x up and down           Walking w/wide ELIJAH   NT  Not today  Not today   4 x 8" //bars 4 x 8" //bars    Eccentric step downs    Next            Retro gait               Initials LT MA SB 1/6 LT DG  MA LT LT LT LT LT       Yanni participated in gait training to improve functional mobility and safety for 5 minutes, including:  - Ambulation with no AD and SBA from therapist ~100 ft x2. No LOB and pt required a few cues for step through gait and to not reach out to touch the walls    Hip Range of Motion:    Left active Left Passive Right active  " Right Passive   Flexion 90 NT 95 NT   Ext. Rotation 40 NT 30 NT   Int. Rotation 22 NT 28 NT                  Lower Extremity Strength  Right LE   Left LE     Knee extension: 5/5 Knee extension: 4+/5   Knee flexion: 4+/5 Knee flexion: 4+/5   Hip flexion: 4-/5 Hip flexion: 3+/5   Hip Internal Rotation:  4+/5    Hip Internal Rotation: 4+/5      Hip External Rotation: 4+/5    Hip External Rotation: 4/5      Hip extension:  NT Hip extension: NT   Hip abduction: 4-/5 Hip abduction: 4-/5   Hip adduction: NT Hip adduction NT   Ankle dorsiflexion: 5/5 Ankle dorsiflexion: 5/5   Ankle plantarflexion: NT Ankle plantarflexion: NT           Home Exercises Provided and Patient Education Provided     Education provided:   - Ambulating with wide ELIJAH to provide more stability while walking.    Written Home Exercises Provided: no.  Exercises were reviewed and Yanni was able to demonstrate them prior to the end of the session.  Yanni demonstrated good  understanding of the education provided.     See EMR under Patient Instructions for exercises provided printer broken, will provide exercises at future visit..    Assessment     Yanni ambulated into the clinic today with her RW- reminded pt that she is steady enough to begin using her SPC when ambulating outside the house as long as she feels comfortable with it. Performed gait training with no AD today and SBA from therapist. Pt required a few cues to perform step through gait pattern but demonstrated increased fran and step length today. Still has difficulty with eccentric control when descending staircase and required a couple of cues to descend facing forward and not to turn to the side to lower down. Worked on STS today and pt had difficulty from 16 inch chair height, but was able to perform 5 reps from 17 inch chair height with minimal UE assist. Was able to progress dynamic balance exercises outside // bars, though pt still has limited endurance and required frequent rest  suni Liao is progressing well towards her goals.   Pt prognosis is Good.     Pt will continue to benefit from skilled outpatient physical therapy to address the deficits listed in the problem list box on initial evaluation, provide pt/family education and to maximize pt's level of independence in the home and community environment.     Pt's spiritual, cultural and educational needs considered and pt agreeable to plan of care and goals.     Anticipated barriers to physical therapy: none    Goals: Short Term Goals: 4 weeks   1. Pt will understand and be compliant with HEP to promote independent management of current diagnosis. In progress, not met  2. Pt will have increased hip flexion strength to at least 4/5 to decrease shuffling steps during gait and increase safety with ambulation. In progress, not met  3. Pt will be able to ambulate safely with RW for at least 50 ft with no pain to improve ability to ambulate around house. In progress, not met     Long Term Goals: 8 weeks   1. Pt will have decreased FOTO limitation score to demonstrate improved functional mobility. In progress, not met  2. Pt will have increased general LE strength to 5/5 to improve ability to perform sit to stand transfers and improve safety with activities such as toileting. In progress, not met  3. Pt will be able to ambulate safely with least restrictive AD for at least 100 ft to increase ability to ambulate around the house. In progress, not met  4. Pt will have increased hip AROM to at least 95 deg to improve ability to flex hip while ambulating to decrease shuffling steps and improve safety. In progress, not met    Plan     Continue with LE strengthening, gait, and balance as pt tolerates.     Kailyn Vallecillo, PT

## 2020-12-23 ENCOUNTER — CLINICAL SUPPORT (OUTPATIENT)
Dept: REHABILITATION | Facility: HOSPITAL | Age: 80
End: 2020-12-23
Payer: MEDICARE

## 2020-12-23 DIAGNOSIS — R29.898 DECREASED STRENGTH OF LOWER EXTREMITY: ICD-10-CM

## 2020-12-23 DIAGNOSIS — R26.9 GAIT ABNORMALITY: ICD-10-CM

## 2020-12-23 PROCEDURE — 97110 THERAPEUTIC EXERCISES: CPT | Mod: PO

## 2020-12-28 ENCOUNTER — CLINICAL SUPPORT (OUTPATIENT)
Dept: REHABILITATION | Facility: HOSPITAL | Age: 80
End: 2020-12-28
Payer: MEDICARE

## 2020-12-28 DIAGNOSIS — R29.898 DECREASED STRENGTH OF LOWER EXTREMITY: ICD-10-CM

## 2020-12-28 DIAGNOSIS — R26.9 GAIT ABNORMALITY: ICD-10-CM

## 2020-12-28 PROCEDURE — 97110 THERAPEUTIC EXERCISES: CPT | Mod: PO

## 2020-12-28 NOTE — PROGRESS NOTES
Physical Therapy Treatment Note     Name: Yanni Car  Clinic Number: [id number]    Therapy Diagnosis:   Encounter Diagnoses   Name Primary?    Decreased strength of lower extremity     Gait abnormality      Physician: Rajni No MD    Visit Date: 12/28/2020    Physician Orders: PT Eval and Treat   Medical Diagnosis from Referral: S72.002A,S32.82XA (ICD-10-CM) - Traumatic fracture of multiple bones of left hip and pelvis  Evaluation Date: 11/2/2020  Authorization Period Expiration: 1/02/2021  Plan of Care Expiration: 1/1/2021  Visit # / Visits authorized: 14 / 24      Time In: 1:15 pm  Time Out: 2:00 pm  Total Billable Time: 45 minutes    Precautions: Standard and Fall    Subjective     Pt reports: no pain this afternoon, now going almost all day without using her walker. She does use it when carrying her plate to eat as it is easier with the tray.   She was compliant with home exercise program.  Response to previous treatment: slight increase in soreness   Functional change: Able to ambulate around the house with no AD    Pain: 0/10   Location: left hip        Objective     Yanni received therapeutic exercises to develop strength and balance for 40 minutes including:    Date  12/28/2020 12/23/2020 12/9/2020 12/07/2020 12/2/2020 12/01/2020 11/25/2020 11/23/2020 11/18/2020 11/16/2020 11/11/2020 11/9/2020 11/4/2020   VISIT 14/24  01/02/2021 13/24  01/02/2021 12/12  01/02/2021 11/12  01/02/2021 10/12 9/12 8/12 7/12 6/12 5/12 4/12 3/12 2/12   FOTO    --  --  --  5/5 4/5 3/5 2/5   POC EXP. DATE 01/01/2021 01/1/2021 01/1/2021 01/01/2021 01/01/2021 01/01/2021 1/1/2021 1/1/2021 1/1/2021       VISIT AMOUNT  MEDICARE TOTAL    90.62  960.76 90.62  870.14 90.62  779.52 90.96  688.90 90.62  597.94 60.64  507.30 90.96  446.66 90.96  355.70 90.96  264.74 90.96  173.78   FACE-TO-FACE 01/04/2021 01/4/2020 01/4/2020 01/04/2020 12/2/2020 12/02/2020 12/2/2020 12/2/2020 12/2/2020 12/2/2020 12/2/2020 12/2/2020 12/2/2020       "             TABLE:                bridges 3 x 10  3 x 10 NT 3 x 10 3 x 10 2 x 10 2 x 10 2 x 10 2 x 10 2 x 10 2 x 10 2 x 10   clams Not today  3 x 10 BTB NT  2 x 10 GTB 2 x 10 GTB 2 x 10 GTB  2 x 10 RTB supine 2 x 10 RTB supine 2 x 10 YTB supine 2 x 10 YTB supine   SLR 2 x 10 x 2# B  2 x 10 x 2# B NT 1 x 10 2# B 2 x 10 x 1.5# 2 x 10 1.5# 3 x 10 x 1.5# 2 x 10 1.5# 2 x 10 1.5# 2 x 10 1# 2 x 10 1# 1 x 10   Pelvic tilts Not today  20 x 3" NT 20x 3" 20 x 3" 20x 3" 20 x 3"  20x 3" 20x 3" 20x 3"    Pelvic tilts with marching Not today  2 x 10 NT 2 x 10 2 x 10 2 x 10 1 x 15  1 x 10                      SEATED:                LAQ 2 x 10 x 2#  2 x 10 x 2# NT 2 x 10 2# 3 x 1 0 x 1.5# 2 x 10 1.5# 2 x 10 x 1.5#   2 x 10 1.5# 2 x 10 B 1# 2 x 10 B   Hamstring curls 2 x 10 GTB  2 x 10 GTB NT  2 x 10 GTB 1 x 10 GTB 2 x 10 GTB   2 x 10 GTB B 2 x 10 RTB B 2 x 10 RTB B   Marching  ---  -- NT  Not today  -- 2 x 10  2 x 10 2 x 10 2 x 10   Sit to Stand 10 x 17 in chair 5x 18 in chair  5x 17 in chair              STANDING:                Marching gait 2 x 15 ft 2 x 15 ft  4 x 8 ft // bars 4 x 8 ft // bars 4 x 8 ft //bars 4 x 8 ft // bars 4 x 8 ft // bars 4 x 8 ft //bars 2x 8ft in // bars 2 x 10 2 x 10 2 x 10 2 x 10   SLS Not today   NT  3 x 30" B  3x B Not today 4x B 3x B 4x B 4x B 3x    Side stepping 2 x 15 ft 2 x 15 ft 4 x 8 ft // bars 4 x 8 ft //bars 4 x 8ft //bars 4 x 8 ft //bars  4 x 8 ft //bars   4 x 8" //bars 4 x 8" //bars    Tandem stance   3 x 20" B 3 x 20" B 3 x 20" B 3x 20" B  3 x 15" B Not today 3 x 15" B 3 x 15" B 3 x 15" B 3x 15" B    Steps  Not today 5x up & down both sides staircase R HR only 4 x 3" step up and down R HR only 4 x 3" step up and down R HR only 4x up and down           Walking w/wide ELIJAH Not today   NT  Not today  Not today   4 x 8" //bars 4 x 8" //bars    Eccentric step downs  Not today   Next            Retro gait Not today               weaving around cones 3 cones x 2 x 15 ft               Initials DG LT " MA SB 1/6 LT DG  MA LT LT LT LT LT       Yanni participated in gait training to improve functional mobility and safety for 5 minutes, including:  - Ambulation with no AD and SBA from therapist -68 ft x 2, carrying plate with 1/2 lb, no AD, SBA 25 ft x2. No LOB and pt required a few cues for step through gait and to not reach out to touch the walls    Home Exercises Provided and Patient Education Provided     Education provided:   - Ambulating with wide ELIJAH to provide more stability while walking.    Written Home Exercises Provided: no.  Exercises were reviewed and Yanni was able to demonstrate them prior to the end of the session.  Yanni demonstrated good  understanding of the education provided.     See EMR under Patient Instructions for exercises provided printer broken, will provide exercises at future visit..    Assessment     Yanni ambulated into the clinic without an AD but often reaches outside her ELIJAH to touch walls and furniture in the clinic. She ambulated with a wide ELIJAH and hands in a high guard position. She required frequent verbal cues to relax her hands down to her sides and to avoid shuffling her feet. She used a stepping strategy as she reached for the therapist to maintain her balance while performing side stepping. She had two near loss of balance when making a turn while ambulating as she often stops and drags her feet. She performed all of today's activities with no increase in symptoms prior to leaving the clinic.  Yanni is progressing well towards her goals.   Pt prognosis is Good.     Pt will continue to benefit from skilled outpatient physical therapy to address the deficits listed in the problem list box on initial evaluation, provide pt/family education and to maximize pt's level of independence in the home and community environment.     Pt's spiritual, cultural and educational needs considered and pt agreeable to plan of care and goals.     Anticipated barriers to physical  therapy: none    Goals: Short Term Goals: 4 weeks   1. Pt will understand and be compliant with HEP to promote independent management of current diagnosis. In progress, not met  2. Pt will have increased hip flexion strength to at least 4/5 to decrease shuffling steps during gait and increase safety with ambulation. In progress, not met  3. Pt will be able to ambulate safely with RW for at least 50 ft with no pain to improve ability to ambulate around house. In progress, not met     Long Term Goals: 8 weeks   1. Pt will have decreased FOTO limitation score to demonstrate improved functional mobility. In progress, not met  2. Pt will have increased general LE strength to 5/5 to improve ability to perform sit to stand transfers and improve safety with activities such as toileting. In progress, not met  3. Pt will be able to ambulate safely with least restrictive AD for at least 100 ft to increase ability to ambulate around the house. In progress, not met  4. Pt will have increased hip AROM to at least 95 deg to improve ability to flex hip while ambulating to decrease shuffling steps and improve safety. In progress, not met    Plan     Resume stair training and all of her usual mat exercises next visit.     Radha Tirdao, PT

## 2020-12-30 ENCOUNTER — CLINICAL SUPPORT (OUTPATIENT)
Dept: REHABILITATION | Facility: HOSPITAL | Age: 80
End: 2020-12-30
Payer: MEDICARE

## 2020-12-30 DIAGNOSIS — R26.9 GAIT ABNORMALITY: ICD-10-CM

## 2020-12-30 DIAGNOSIS — R29.898 DECREASED STRENGTH OF LOWER EXTREMITY: ICD-10-CM

## 2020-12-30 PROCEDURE — 97110 THERAPEUTIC EXERCISES: CPT | Mod: PO

## 2020-12-30 NOTE — PROGRESS NOTES
Physical Therapy Treatment Note     Name: Yanni Car  Clinic Number: [id number]    Therapy Diagnosis:   Encounter Diagnoses   Name Primary?    Decreased strength of lower extremity     Gait abnormality      Physician: Rajni No MD    Visit Date: 12/30/2020    Physician Orders: PT Eval and Treat   Medical Diagnosis from Referral: S72.002A,S32.82XA (ICD-10-CM) - Traumatic fracture of multiple bones of left hip and pelvis  Evaluation Date: 11/2/2020  Authorization Period Expiration: 1/02/2021  Plan of Care Expiration: 1/1/2021  Visit # / Visits authorized: 15 / 24      Time In: 1:45 pm  Time Out: 2:30 pm  Total Billable Time: 45 minutes    Precautions: Standard and Fall    Subjective     Pt reports: she has been moving around the house well without her walker- has been making a point to get up and walk around periodically   She was compliant with home exercise program.  Response to previous treatment: slight increase in soreness   Functional change: Able to ambulate around the house with no AD    Pain: 0/10   Location: left hip        Objective     Yanni received therapeutic exercises to develop strength and balance for 45 minutes including:    Date  12/30/2020 12/28/2020 12/23/2020 12/9/2020 12/07/2020 12/2/2020 12/01/2020 11/25/2020 11/23/2020 11/18/2020 11/16/2020 11/11/2020 11/9/2020 11/4/2020   VISIT 15/24  02/14/2021 14/24  01/02/2021 13/24  01/02/2021 12/12  01/02/2021 11/12  01/02/2021 10/12 9/12 8/12 7/12 6/12 5/12 4/12 3/12 2/12   FOTO     --  --  --  5/5 4/5 3/5 2/5   POC EXP. DATE 01/30/2021 01/01/2021 01/1/2021 01/1/2021 01/01/2021 01/01/2021 01/01/2021 1/1/2021 1/1/2021 1/1/2021       VISIT AMOUNT  MEDICARE TOTAL     90.62  960.76 90.62  870.14 90.62  779.52 90.96  688.90 90.62  597.94 60.64  507.30 90.96  446.66 90.96  355.70 90.96  264.74 90.96  173.78   FACE-TO-FACE 01/04/2021 01/04/2021 01/4/2020 01/4/2020 01/04/2020 12/2/2020 12/02/2020 12/2/2020 12/2/2020 12/2/2020 12/2/2020 12/2/2020  "12/2/2020 12/2/2020                    TABLE:                 bridges  3 x 10  3 x 10 NT 3 x 10 3 x 10 2 x 10 2 x 10 2 x 10 2 x 10 2 x 10 2 x 10 2 x 10   clams  Not today  3 x 10 BTB NT  2 x 10 GTB 2 x 10 GTB 2 x 10 GTB  2 x 10 RTB supine 2 x 10 RTB supine 2 x 10 YTB supine 2 x 10 YTB supine   SLR  2 x 10 x 2# B  2 x 10 x 2# B NT 1 x 10 2# B 2 x 10 x 1.5# 2 x 10 1.5# 3 x 10 x 1.5# 2 x 10 1.5# 2 x 10 1.5# 2 x 10 1# 2 x 10 1# 1 x 10   Pelvic tilts  Not today  20 x 3" NT 20x 3" 20 x 3" 20x 3" 20 x 3"  20x 3" 20x 3" 20x 3"    Pelvic tilts with marching  Not today  2 x 10 NT 2 x 10 2 x 10 2 x 10 1 x 15  1 x 10                       SEATED:                 LAQ  2 x 10 x 2#  2 x 10 x 2# NT 2 x 10 2# 3 x 1 0 x 1.5# 2 x 10 1.5# 2 x 10 x 1.5#   2 x 10 1.5# 2 x 10 B 1# 2 x 10 B   Hamstring curls  2 x 10 GTB  2 x 10 GTB NT  2 x 10 GTB 1 x 10 GTB 2 x 10 GTB   2 x 10 GTB B 2 x 10 RTB B 2 x 10 RTB B   Marching   ---  -- NT  Not today  -- 2 x 10  2 x 10 2 x 10 2 x 10   Sit to Stand  10 x 17 in chair 5x 18 in chair  5x 17 in chair              STANDING:                 Marching gait 2 x 15 ft 2 x 15 ft 2 x 15 ft  4 x 8 ft // bars 4 x 8 ft // bars 4 x 8 ft //bars 4 x 8 ft // bars 4 x 8 ft // bars 4 x 8 ft //bars 2x 8ft in // bars 2 x 10 2 x 10 2 x 10 2 x 10   SLS 2 x 15" Not today   NT  3 x 30" B  3x B Not today 4x B 3x B 4x B 4x B 3x    Side stepping 2 x 15 ft 2 x 15 ft 2 x 15 ft 4 x 8 ft // bars 4 x 8 ft //bars 4 x 8ft //bars 4 x 8 ft //bars  4 x 8 ft //bars   4 x 8" //bars 4 x 8" //bars    Tandem stance    3 x 20" B 3 x 20" B 3 x 20" B 3x 20" B  3 x 15" B Not today 3 x 15" B 3 x 15" B 3 x 15" B 3x 15" B    Steps   Not today 5x up & down both sides staircase R HR only 4 x 3" step up and down R HR only 4 x 3" step up and down R HR only 4x up and down           Walking w/wide ELIJAH  Not today   NT  Not today  Not today   4 x 8" //bars 4 x 8" //bars    Eccentric step downs  L1 1 x 10 B Not today   Next            Retro gait  Not today   "             weaving around cones 4 cones 2 x 15 ft 3 cones x 2 x 15 ft               Initials LT DG LT MA SB 1/6 LT DG  MA LT LT LT LT LT     Hip Range of Motion:    Left active Left Passive Right active  Right Passive   Flexion 111 90 118 96   Ext. Rotation 43 NT 30 NT   Int. Rotation 14 NT 22 NT                  Lower Extremity Strength  Right LE   Left LE     Knee extension: 5/5 Knee extension: 5/5   Knee flexion: 5/5 Knee flexion: 5/5   Hip flexion: 4-/5 Hip flexion: 3+/5   Hip Internal Rotation:  4+/5    Hip Internal Rotation: 4+/5      Hip External Rotation: 4+/5    Hip External Rotation: 4+/5      Hip extension:  NT Hip extension: NT   Hip abduction: 4-/5 Hip abduction: 4/5   Hip adduction: NT Hip adduction NT   Ankle dorsiflexion: 5/5 Ankle dorsiflexion: 5/5   Ankle plantarflexion: NT Ankle plantarflexion: NT       Yanni participated in gait training to improve functional mobility and safety for 0 minutes, including: NOT TODAY  - Ambulation with no AD and SBA from therapist -68 ft x 2, carrying plate with 1/2 lb, no AD, SBA 25 ft x2. No LOB and pt required a few cues for step through gait and to not reach out to touch the walls    Home Exercises Provided and Patient Education Provided     Education provided:   - Ambulating with wide ELIJAH to provide more stability while walking.    Written Home Exercises Provided: no.  Exercises were reviewed and Yanni was able to demonstrate them prior to the end of the session.  Yanni demonstrated good  understanding of the education provided.     See EMR under Patient Instructions for exercises provided printer broken, will provide exercises at future visit..    Assessment     Yanni arrived at the clinic without her RW and reports having ambulated around her house without it for a few days. Tolerated all exercises well with no increase in symptoms. Pt has demonstrated good dynamic balance and is stable enough to walk without an AD but continues to be a little  nervous and reaches out for furniture or walls even though she is not losing her balance. Required multiple cues to correct this. Also remains fearful of single leg balance activities and cannot balance for longer than a few seconds on 1 leg- performed SL balance exercises today with 1 hand resting lightly on parallel bars to build confidence.  Yanni is progressing well towards her goals.   Pt prognosis is Good.     Pt will continue to benefit from skilled outpatient physical therapy to address the deficits listed in the problem list box on initial evaluation, provide pt/family education and to maximize pt's level of independence in the home and community environment.     Pt's spiritual, cultural and educational needs considered and pt agreeable to plan of care and goals.     Anticipated barriers to physical therapy: none    Goals: Short Term Goals: 4 weeks   1. Pt will understand and be compliant with HEP to promote independent management of current diagnosis. Met  2. Pt will have increased hip flexion strength to at least 4/5 to decrease shuffling steps during gait and increase safety with ambulation. In progress, not met  3. Pt will be able to ambulate safely with RW for at least 50 ft with no pain to improve ability to ambulate around house. Met     Long Term Goals: 8 weeks   1. Pt will have decreased FOTO limitation score to demonstrate improved functional mobility. In progress, not met  2. Pt will have increased general LE strength to 5/5 to improve ability to perform sit to stand transfers and improve safety with activities such as toileting. In progress, not met  3. Pt will be able to ambulate safely with least restrictive AD for at least 100 ft to increase ability to ambulate around the house. Met  4. Pt will have increased hip AROM to at least 95 deg to improve ability to flex hip while ambulating to decrease shuffling steps and improve safety. Met    Plan     Continue with static and dynamic balance  training as pt tolerates.    Kailyn Vallecillo, PT

## 2020-12-30 NOTE — PLAN OF CARE
Outpatient Therapy Updated Plan of Care     Visit Date: 12/30/2020  Name: Yanni Car  Clinic Number: [id number]    Therapy Diagnosis:   Encounter Diagnoses   Name Primary?    Decreased strength of lower extremity     Gait abnormality      Physician: Rajni No MD       Physician Orders: PT Eval and Treat   Medical Diagnosis from Referral: S72.002A,S32.82XA (ICD-10-CM) - Traumatic fracture of multiple bones of left hip and pelvis  Evaluation Date: 11/2/2020    Total Visits Received: 16  Cancelled Visits: 0  No Show Visits: 0    Current Certification Period:  11/2/2020 to 1/2/2021  Precautions:  Standard and Fall  Visits from Evaluation Date:  15  Functional Level Prior to Evaluation:  Ambulating with RW and decreased balance    Subjective     Update: Pt reports: she has been moving around the house well without her walker- has been making a point to get up and walk around periodically   She was compliant with home exercise program.  Response to previous treatment: slight increase in soreness   Functional change: Able to ambulate around the house with no AD    Pain: 0/10   Location: left hip      Objective     Update: Hip Range of Motion:    Left active Left Passive Right active  Right Passive   Flexion 111 90 118 96   Ext. Rotation 43 NT 30 NT   Int. Rotation 14 NT 22 NT        Lower Extremity Strength           Right LE   Left LE      Knee extension: 5/5 Knee extension: 5/5    Knee flexion: 5/5 Knee flexion: 5/5    Hip flexion: 4-/5 Hip flexion: 3+/5    Hip Internal Rotation:  4+/5    Hip Internal Rotation: 4+/5      Hip External Rotation: 4+/5    Hip External Rotation: 4+/5      Hip extension:  NT Hip extension: NT    Hip abduction: 4-/5 Hip abduction: 4/5    Hip adduction: NT Hip adduction NT    Ankle dorsiflexion: 5/5 Ankle dorsiflexion: 5/5    Ankle plantarflexion: NT Ankle plantarflexion: NT           Assessment     Update: Yanni arrived at the clinic without her RW and reports having ambulated  around her house without it for a few days. Tolerated all exercises well with no increase in symptoms. Pt has demonstrated good dynamic balance and is stable enough to walk without an AD but continues to be a little nervous and reaches out for furniture or walls even though she is not losing her balance. Required multiple cues to correct this. Also remains fearful of single leg balance activities and cannot balance for longer than a few seconds on 1 leg- performed SL balance exercises today with 1 hand resting lightly on bars.    Previous Short Term Goals Status:  Still working toward strength goal, but have met all others.  New Short Term Goals Status: New goal: Pt will be able to balance on one leg for at least 10 secs to demonstrate improved balance and decrease fall risk with ambulation.   Long Term Goal Status:   continue per initial plan of care.  Reasons for Recertification of Therapy:   Pt has made great progress, but still has room for improvement with static and dynamic balance.    Plan     Updated Certification Period: 12/30/2020 to 1/30/2021  Recommended Treatment Plan: 2 times per week for 4 weeks: Electrical Stimulation IFC, Gait Training, Manual Therapy, Moist Heat/ Ice, Neuromuscular Re-ed, Orthotic Management and Training, Patient Education, Self Care, Therapeutic Activites, Therapeutic Exercise, Ultrasound and kinesiotaping and dry needling    Kailyn Vallecillo, PT  12/30/2020      I CERTIFY THE NEED FOR THESE SERVICES FURNISHED UNDER THIS PLAN OF TREATMENT AND WHILE UNDER MY CARE    Physician's comments:        Physician's Signature: ___________________________________________________

## 2021-01-04 ENCOUNTER — CLINICAL SUPPORT (OUTPATIENT)
Dept: REHABILITATION | Facility: HOSPITAL | Age: 81
End: 2021-01-04
Payer: MEDICARE

## 2021-01-04 DIAGNOSIS — R26.9 GAIT ABNORMALITY: ICD-10-CM

## 2021-01-04 DIAGNOSIS — R29.898 DECREASED STRENGTH OF LOWER EXTREMITY: ICD-10-CM

## 2021-01-04 PROCEDURE — 97110 THERAPEUTIC EXERCISES: CPT | Mod: PO

## 2021-01-06 ENCOUNTER — CLINICAL SUPPORT (OUTPATIENT)
Dept: REHABILITATION | Facility: HOSPITAL | Age: 81
End: 2021-01-06
Payer: MEDICARE

## 2021-01-06 DIAGNOSIS — R29.898 DECREASED STRENGTH OF LOWER EXTREMITY: ICD-10-CM

## 2021-01-06 DIAGNOSIS — R26.9 GAIT ABNORMALITY: ICD-10-CM

## 2021-01-06 PROCEDURE — 97110 THERAPEUTIC EXERCISES: CPT | Mod: PO

## 2021-01-11 ENCOUNTER — CLINICAL SUPPORT (OUTPATIENT)
Dept: REHABILITATION | Facility: HOSPITAL | Age: 81
End: 2021-01-11
Payer: MEDICARE

## 2021-01-11 DIAGNOSIS — R26.9 GAIT ABNORMALITY: ICD-10-CM

## 2021-01-11 DIAGNOSIS — R29.898 DECREASED STRENGTH OF LOWER EXTREMITY: ICD-10-CM

## 2021-01-11 PROCEDURE — 97110 THERAPEUTIC EXERCISES: CPT | Mod: PO

## 2021-01-13 ENCOUNTER — CLINICAL SUPPORT (OUTPATIENT)
Dept: REHABILITATION | Facility: HOSPITAL | Age: 81
End: 2021-01-13
Payer: MEDICARE

## 2021-01-13 DIAGNOSIS — R26.9 GAIT ABNORMALITY: ICD-10-CM

## 2021-01-13 DIAGNOSIS — R29.898 DECREASED STRENGTH OF LOWER EXTREMITY: ICD-10-CM

## 2021-01-13 PROCEDURE — 97110 THERAPEUTIC EXERCISES: CPT | Mod: PO

## 2021-01-13 PROCEDURE — 97116 GAIT TRAINING THERAPY: CPT | Mod: PO

## 2021-01-19 ENCOUNTER — CLINICAL SUPPORT (OUTPATIENT)
Dept: REHABILITATION | Facility: HOSPITAL | Age: 81
End: 2021-01-19
Payer: MEDICARE

## 2021-01-19 DIAGNOSIS — R26.9 GAIT ABNORMALITY: ICD-10-CM

## 2021-01-19 DIAGNOSIS — R29.898 DECREASED STRENGTH OF LOWER EXTREMITY: Primary | ICD-10-CM

## 2021-01-19 PROCEDURE — 97116 GAIT TRAINING THERAPY: CPT | Mod: PO,CQ

## 2021-01-19 PROCEDURE — 97110 THERAPEUTIC EXERCISES: CPT | Mod: PO,CQ

## 2021-01-25 ENCOUNTER — CLINICAL SUPPORT (OUTPATIENT)
Dept: REHABILITATION | Facility: HOSPITAL | Age: 81
End: 2021-01-25
Payer: MEDICARE

## 2021-01-25 DIAGNOSIS — R29.898 DECREASED STRENGTH OF LOWER EXTREMITY: ICD-10-CM

## 2021-01-25 DIAGNOSIS — R26.9 GAIT ABNORMALITY: ICD-10-CM

## 2021-01-25 PROCEDURE — 97110 THERAPEUTIC EXERCISES: CPT | Mod: PO

## 2021-01-27 ENCOUNTER — CLINICAL SUPPORT (OUTPATIENT)
Dept: REHABILITATION | Facility: HOSPITAL | Age: 81
End: 2021-01-27
Payer: MEDICARE

## 2021-01-27 DIAGNOSIS — R29.898 DECREASED STRENGTH OF LOWER EXTREMITY: ICD-10-CM

## 2021-01-27 DIAGNOSIS — R26.9 GAIT ABNORMALITY: ICD-10-CM

## 2021-01-27 PROCEDURE — 97110 THERAPEUTIC EXERCISES: CPT | Mod: PO

## 2021-11-26 NOTE — PROGRESS NOTES
Purposeful Rounding    Patient Location: Patient room    Patient willing to engage in conversation: No    Presentation/behavior: Anxious and Guarded/Suspicious    Affect: Neutral/Euthymic(normal), Anxious and Irritable    Concerns reported: none      PRN medications given: none    Environmental assessment: Room free from clutter and Clear path to bathroom     Fall prevention interventions in place: Yellow non-skid socks on    Daily Townsend Fall Risk Score: 73    Daily Harper Fall Risk Score: 0      Electronically signed by Brandi Causey RN on 11/25/21 at 9:18 PM EST Received note from Dr. mathis that coumadin d/c. He saw her today. We will d/c from clinic.

## 2022-02-17 RX ORDER — ROSUVASTATIN CALCIUM 10 MG/1
TABLET, COATED ORAL
Qty: 90 TABLET | Refills: 3 | Status: SHIPPED | OUTPATIENT
Start: 2022-02-17 | End: 2023-02-27

## 2022-07-15 DIAGNOSIS — I73.9 PERIPHERAL VASCULAR DISEASE, UNSPECIFIED: ICD-10-CM

## 2022-07-21 ENCOUNTER — HOSPITAL ENCOUNTER (OUTPATIENT)
Dept: RADIOLOGY | Facility: HOSPITAL | Age: 82
Discharge: HOME | End: 2022-07-21
Attending: FAMILY MEDICINE
Payer: MEDICARE

## 2022-07-21 DIAGNOSIS — I73.9 PERIPHERAL VASCULAR DISEASE, UNSPECIFIED: ICD-10-CM

## 2022-07-21 PROCEDURE — 93925 LOWER EXTREMITY STUDY: CPT | Mod: TC,PO

## 2022-07-21 PROCEDURE — 93930 UPPER EXTREMITY STUDY: CPT | Mod: TC,PO

## 2022-08-31 ENCOUNTER — HOSPITAL ENCOUNTER (OUTPATIENT)
Dept: RADIOLOGY | Facility: HOSPITAL | Age: 82
Discharge: HOME | End: 2022-08-31
Attending: INTERNAL MEDICINE
Payer: MEDICARE

## 2022-08-31 ENCOUNTER — OFFICE VISIT (OUTPATIENT)
Dept: CARDIOLOGY | Facility: CLINIC | Age: 82
End: 2022-08-31
Payer: MEDICARE

## 2022-08-31 VITALS
HEART RATE: 61 BPM | BODY MASS INDEX: 26.68 KG/M2 | WEIGHT: 170 LBS | HEIGHT: 67 IN | DIASTOLIC BLOOD PRESSURE: 61 MMHG | SYSTOLIC BLOOD PRESSURE: 131 MMHG

## 2022-08-31 DIAGNOSIS — R09.89 BILATERAL CAROTID BRUITS: ICD-10-CM

## 2022-08-31 DIAGNOSIS — I10 ESSENTIAL HYPERTENSION: ICD-10-CM

## 2022-08-31 DIAGNOSIS — E78.2 MIXED HYPERLIPIDEMIA: ICD-10-CM

## 2022-08-31 DIAGNOSIS — I70.229 CRITICAL LOWER LIMB ISCHEMIA: ICD-10-CM

## 2022-08-31 DIAGNOSIS — I70.229 CRITICAL LOWER LIMB ISCHEMIA: Primary | ICD-10-CM

## 2022-08-31 DIAGNOSIS — N18.30 STAGE 3 CHRONIC KIDNEY DISEASE, UNSPECIFIED WHETHER STAGE 3A OR 3B CKD: ICD-10-CM

## 2022-08-31 DIAGNOSIS — I73.9 PAD (PERIPHERAL ARTERY DISEASE): ICD-10-CM

## 2022-08-31 DIAGNOSIS — Z72.0 TOBACCO USE: ICD-10-CM

## 2022-08-31 PROCEDURE — 1159F PR MEDICATION LIST DOCUMENTED IN MEDICAL RECORD: ICD-10-PCS | Mod: CPTII,S$GLB,, | Performed by: INTERNAL MEDICINE

## 2022-08-31 PROCEDURE — 1126F PR PAIN SEVERITY QUANTIFIED, NO PAIN PRESENT: ICD-10-PCS | Mod: CPTII,S$GLB,, | Performed by: INTERNAL MEDICINE

## 2022-08-31 PROCEDURE — 3288F FALL RISK ASSESSMENT DOCD: CPT | Mod: CPTII,S$GLB,, | Performed by: INTERNAL MEDICINE

## 2022-08-31 PROCEDURE — 3078F PR MOST RECENT DIASTOLIC BLOOD PRESSURE < 80 MM HG: ICD-10-PCS | Mod: CPTII,S$GLB,, | Performed by: INTERNAL MEDICINE

## 2022-08-31 PROCEDURE — 3078F DIAST BP <80 MM HG: CPT | Mod: CPTII,S$GLB,, | Performed by: INTERNAL MEDICINE

## 2022-08-31 PROCEDURE — 3075F PR MOST RECENT SYSTOLIC BLOOD PRESS GE 130-139MM HG: ICD-10-PCS | Mod: CPTII,S$GLB,, | Performed by: INTERNAL MEDICINE

## 2022-08-31 PROCEDURE — 99215 PR OFFICE/OUTPT VISIT, EST, LEVL V, 40-54 MIN: ICD-10-PCS | Mod: S$GLB,,, | Performed by: INTERNAL MEDICINE

## 2022-08-31 PROCEDURE — 1159F MED LIST DOCD IN RCRD: CPT | Mod: CPTII,S$GLB,, | Performed by: INTERNAL MEDICINE

## 2022-08-31 PROCEDURE — 99215 OFFICE O/P EST HI 40 MIN: CPT | Mod: S$GLB,,, | Performed by: INTERNAL MEDICINE

## 2022-08-31 PROCEDURE — 1101F PT FALLS ASSESS-DOCD LE1/YR: CPT | Mod: CPTII,S$GLB,, | Performed by: INTERNAL MEDICINE

## 2022-08-31 PROCEDURE — 99999 PR PBB SHADOW E&M-EST. PATIENT-LVL IV: ICD-10-PCS | Mod: PBBFAC,,, | Performed by: INTERNAL MEDICINE

## 2022-08-31 PROCEDURE — 1126F AMNT PAIN NOTED NONE PRSNT: CPT | Mod: CPTII,S$GLB,, | Performed by: INTERNAL MEDICINE

## 2022-08-31 PROCEDURE — 3288F PR FALLS RISK ASSESSMENT DOCUMENTED: ICD-10-PCS | Mod: CPTII,S$GLB,, | Performed by: INTERNAL MEDICINE

## 2022-08-31 PROCEDURE — 1101F PR PT FALLS ASSESS DOC 0-1 FALLS W/OUT INJ PAST YR: ICD-10-PCS | Mod: CPTII,S$GLB,, | Performed by: INTERNAL MEDICINE

## 2022-08-31 PROCEDURE — 3075F SYST BP GE 130 - 139MM HG: CPT | Mod: CPTII,S$GLB,, | Performed by: INTERNAL MEDICINE

## 2022-08-31 PROCEDURE — 99999 PR PBB SHADOW E&M-EST. PATIENT-LVL IV: CPT | Mod: PBBFAC,,, | Performed by: INTERNAL MEDICINE

## 2022-08-31 PROCEDURE — 73630 X-RAY EXAM OF FOOT: CPT | Mod: TC,FY,PO,RT

## 2022-08-31 RX ORDER — SODIUM CHLORIDE 9 MG/ML
INJECTION, SOLUTION INTRAVENOUS CONTINUOUS
Status: CANCELLED | OUTPATIENT
Start: 2022-08-31 | End: 2022-08-31

## 2022-08-31 RX ORDER — DIPHENHYDRAMINE HCL 25 MG
50 CAPSULE ORAL ONCE
Status: CANCELLED | OUTPATIENT
Start: 2022-08-31 | End: 2022-08-31

## 2022-08-31 NOTE — H&P (VIEW-ONLY)
Subjective:   Patient ID:  Yanni Car is a 81 y.o. female who presents to establish care for  Peripheral Arterial Disease, Carotid Artery Disease, Hyperlipidemia, Hypertension, and Non-healing Wound      HPI:       Yanni Car 81 y.o. female is here accompanied by her  by recommendation of her care team for right great toe gangrene.  Wound started nearly 6 weeks ago.  She has no recollection of any trauma. She has 7/10 pain with intermittent jolting sensation. Dfane IV; Qulin 5.         She has a past history of dafne IIb R calf claudication. She was previously under the care of Dr. Valerio. She had PAF several years ago during an hospitalization with SIRS. She was on on coumadin and amiodarone for 2 years. She in NSR She has PAD s/p failed SVG R fem-pop in 8/2013 with bilateral SFA CTOs. She smokes and is very interested in quitting. She takes aspirin 81 mg daily. No CVA/TIA.       ECG 8/2018: sinus bradycardia       Afib was related to SIRS in 1/2012 while she was admitted for pneumonia    Carotid US 9/2016   Patent bilateral ICA   Patent L vertebral   Retrograde flow R vertebral concerning for steal syndrome    Event monitor 8/2016   No afib      Echo 2016:   Normal EF      S/p failed R reverse vein fem-pop done 8/2013   Failure within 2 to 4 weeks         JUDITH 10/2013     R 0.76   L 0.79      US 2013    R , pSFA 112, mSFA 259, dSFA 32, POP 48, PT 38, AT 27, DP 23  Failed fem-pop + R SFA       US 4/2019   Failed R fem-pop   Bilateral SFA CTOs            JUDITH 1/2019     R NC with TBI 0.41   L 1.28 with TBI 0.41   Monophasic waveforms bilaterally       JUDITH 2/2020     NC vessels bilaterally         LE US 7/2022     High grade L JET stenosis    High R EIA stenosis   Bilateral SFA    Patent BTK vessels        UE US 7/2022     No significant disease           Patient Active Problem List    Diagnosis Date Noted    Critical lower limb ischemia 08/31/2022    Decreased strength of lower  extremity 2020    Decreased range of motion of both hips 2020    Gait abnormality 2020    Acute fracture 2020    Closed fracture of left inferior pubic ramus     Closed nondisplaced fracture of anterior wall of left acetabulum     Wheezing     Essential hypertension     Fx sacrum/coccyx-closed 2020    Fracture of third lumbar vertebra 2020    Glaucoma, right eye 2020    CKD (chronic kidney disease) stage 3, GFR 30-59 ml/min 2020    Common bile duct dilatation 2020    Long term current use of anticoagulant therapy 2016    PAD (peripheral artery disease) 2016       S/p failed R reverse vein fem-pop done 2013   Failure within 2 to 4 weeks         JUDITH 10/2013     R 0.76   L 0.79      US       R , pSFA 112, mSFA 259, dSFA 32, POP 48, PT 38, AT 27, DP 23  Failed fem-pop          JUDITH 2019     R NC with TBI 0.41   L 1.28 with TBI 0.41   Monophasic waveforms bilaterally       Tobacco use 2016    Hyperlipidemia 2016    Paroxysmal A-fib 2016     In NSR today-on amiodarone  [id number] 4 with 4% risk of yearly CVA    Afib was related to SIRS in 2012  She was admitted for pneumonia      2019:    Normal EF   Normal diastolic parameters   No valvular disease                  Bilateral carotid bruits 2016         US      R ICA  cm/sec   L ICA PSV 93 cm/sec   Retrograde R vertebral   Antegrade L vertebral             BCC (basal cell carcinoma), scalp/neck 2012    Post-operative state 2012    Mohs defect of eyelid 2012    Basal cell carcinoma of eyelid 09/10/2012    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   HENT:  Negative for congestion.    Eyes:  Negative for blurred vision, discharge  and double vision.   Cardiovascular:  Positive for leg swelling. Negative for chest pain, claudication (R >> L ), cyanosis, dyspnea on exertion, irregular heartbeat, near-syncope, orthopnea, palpitations, paroxysmal nocturnal dyspnea and syncope.   Respiratory:  Negative for cough, shortness of breath and wheezing.    Endocrine: Negative for cold intolerance, heat intolerance and polyphagia.   Hematologic/Lymphatic: Negative for adenopathy and bleeding problem. Does not bruise/bleed easily.   Skin:  Negative for dry skin and nail changes.   Musculoskeletal:  Negative for arthritis, back pain, falls, joint pain, myalgias and neck pain.   Gastrointestinal:  Negative for bloating, abdominal pain, change in bowel habit and constipation.   Genitourinary:  Negative for bladder incontinence, dysuria, flank pain, genital sores and missed menses.   Neurological:  Negative for aphonia, brief paralysis, difficulty with concentration, dizziness and weakness.   Psychiatric/Behavioral:  Negative for altered mental status and memory loss. The patient does not have insomnia.    Allergic/Immunologic: Negative for environmental allergies.     Objective:   Physical Exam  Constitutional:       Appearance: She is well-developed.      Interventions: She is not intubated.  HENT:      Head: Normocephalic and atraumatic.      Right Ear: External ear normal.      Left Ear: External ear normal.   Eyes:      General: No scleral icterus.        Right eye: No discharge.         Left eye: No discharge.      Conjunctiva/sclera: Conjunctivae normal.      Pupils: Pupils are equal, round, and reactive to light.   Neck:      Thyroid: No thyromegaly.      Vascular: Normal carotid pulses. No carotid bruit, hepatojugular reflux or JVD.      Trachea: No tracheal deviation.   Cardiovascular:      Rate and Rhythm: Normal rate and regular rhythm. No extrasystoles are present.     Chest Wall: PMI is not displaced.      Pulses:           Carotid pulses are 2+  on the right side with bruit and 2+ on the left side with bruit.       Radial pulses are 2+ on the right side and 2+ on the left side.        Femoral pulses are 2+ on the right side and 2+ on the left side.       Popliteal pulses are 2+ on the right side and 2+ on the left side.        Dorsalis pedis pulses are 0 on the right side and 0 on the left side.        Posterior tibial pulses are 0 on the right side and 0 on the left side.      Heart sounds: S1 normal and S2 normal. Heart sounds not distant. No midsystolic click. No murmur heard.    No friction rub. No gallop. No S3 sounds.      Comments: Monophasic R PT and biphasic R DP doppler signals     Biphasic L PT and DP doppler signals       Pulmonary:      Effort: Pulmonary effort is normal. No tachypnea, bradypnea, accessory muscle usage or respiratory distress. She is not intubated.      Breath sounds: Normal breath sounds. No stridor. No decreased breath sounds, wheezing or rales.   Chest:      Chest wall: No tenderness.   Abdominal:      General: There is no distension or abdominal bruit.      Palpations: There is no mass or pulsatile mass.      Tenderness: There is no abdominal tenderness. There is no guarding or rebound.   Musculoskeletal:         General: No tenderness. Normal range of motion.      Cervical back: Normal range of motion and neck supple.   Lymphadenopathy:      Cervical: No cervical adenopathy.      Comments: Trace edema of bilateral ankles   Skin:     General: Skin is warm.      Coloration: Skin is not pale.      Findings: No erythema or rash.      Comments:     R great toe gangrene     See images                  Neurological:      Mental Status: She is alert and oriented to person, place, and time.      Cranial Nerves: No cranial nerve deficit.      Coordination: Coordination normal.      Deep Tendon Reflexes: Reflexes are normal and symmetric.   Psychiatric:         Behavior: Behavior normal.         Thought Content: Thought content  normal.         Judgment: Judgment normal.       8/2022                        Assessment:     1. Critical lower limb ischemia    2. Bilateral carotid bruits    3. Essential hypertension    4. Mixed hyperlipidemia    5. PAD (peripheral artery disease)    6. Tobacco use    7. Stage 3 chronic kidney disease, unspecified whether stage 3a or 3b CKD          Plan:         Obtain JUDITH with TBI, complete right foot x-ray, ESR, CRP, lipid profile, CMP, CBC, and hemoglobin A1c.        She will have her angiogram with possible revascularization.  Diagnostic angiogram will be done via left radial access then determine revascularization strategy.       We had a long discussion regarding the significance of peripheral arterial disease and critical limb ischemia and their association with limb loss and high mortality.       Referral to wound care.  Guideline directed medical therapy for peripheral arterial disease.        Low salt diet  Exercise  Smoking cessation enrollment  Intense statin therapy  Target BP < 130/80 mmHg      Yearly carotid ultrasound for bruit  Pletal 100 mg bid for claudication          Smoking cessation was discussed  She was not interested in quitting smoking in the past however after discussing the overall clinical implication critical limb ischemia she is open to the idea of smoking cessation.  Smoking cessation referral was placed.              Continue with current medical plan and lifestyle changes.  Return sooner for concerns or questions. If symptoms persist go to the ED  I have reviewed all pertinent data on this patient       I have reviewed the patient's medical history in detail and updated the computerized patient record.    Orders Placed This Encounter   Procedures    X-Ray Foot Complete Right     Standing Status:   Future     Standing Expiration Date:   8/31/2023     Order Specific Question:   May the Radiologist modify the order per protocol to meet the clinical needs of the patient?      Answer:   Yes     Order Specific Question:   Release to patient     Answer:   Immediate    Sedimentation rate     Standing Status:   Future     Standing Expiration Date:   10/30/2023    C-REACTIVE PROTEIN     Standing Status:   Future     Standing Expiration Date:   10/30/2023    CBC Auto Differential     Standing Status:   Future     Standing Expiration Date:   10/30/2023    Comprehensive Metabolic Panel     Standing Status:   Future     Standing Expiration Date:   10/30/2023    Lipid Panel     Standing Status:   Future     Standing Expiration Date:   10/30/2023    Ambulatory referral/consult to Wound Clinic     Standing Status:   Future     Standing Expiration Date:   9/30/2023     Referral Priority:   Routine     Referral Type:   Consultation     Referral Reason:   Specialty Services Required     Requested Specialty:   Wound Care     Number of Visits Requested:   1    Ambulatory referral/consult to Smoking Cessation Program     Standing Status:   Future     Standing Expiration Date:   9/30/2023     Referral Priority:   Routine     Referral Type:   Consultation     Referral Reason:   Specialty Services Required     Requested Specialty:   CTTS     Number of Visits Requested:   1    Ankle Brachial Indices (JUDITH)     Standing Status:   Future     Standing Expiration Date:   8/31/2023     Order Specific Question:   Exam Type:     Answer:   Resting with toe tracings     Order Specific Question:   Release to patient     Answer:   Immediate    Case Request-Cath Lab: AORTOGRAM, WITH SERIALOGRAPHY     Standing Status:   Standing     Number of Occurrences:   1     Order Specific Question:   CPT Code:     Answer:   LA  ANGIO AORTOBIFEMORAL W CATH [01804]     Order Specific Question:   CPT Code:     Answer:   LA REVASCULARIZE ILIAC ARTERY,ANGIOPLASTY/STENT, INITIAL VESSEL [92532]     Order Specific Question:   CPT Code:     Answer:   LA CORONARY IVUS ONLY [[id number]]     Order Specific Question:   Medical Necessity:      Answer:   Medically Urgent [101]     Order Specific Question:   Is an on-site pathologist required for this procedure?     Answer:   N/A         Follow up as scheduled. Return sooner for concerns or questions            She expressed verbal understanding and agreed with the plan        Greater than 50% of the visit of 45 minutes was spent counseling, educating, and coordinating the care of the patient.      -In today's visit, at least 4 established conditions that pose a risk to life or bodily function have been addressed and the conditions are severe.    -In today's visit, monitoring for drug toxicity was accomplished.            Patient's Medications   New Prescriptions    No medications on file   Previous Medications    AMIODARONE (PACERONE) 200 MG TAB    TAKE 1 TABLET BY MOUTH EVERY DAY    AMLODIPINE (NORVASC) 10 MG TABLET    TAKE 1 TABLET BY MOUTH ONCE A DAY    ASPIRIN (ECOTRIN) 81 MG EC TABLET    Take 81 mg by mouth once daily.      CALCIUM-VITAMIN D3 (OS-NOELLE 500 + D3) 500 MG-5 MCG (200 UNIT) PER TABLET    Take 1 tablet by mouth 2 (two) times daily with meals.    CILOSTAZOL (PLETAL) 100 MG TAB    TAKE 1 TABLET BY MOUTH TWO TIMES A DAY    FISH OIL-OMEGA-3 FATTY ACIDS 300-1,000 MG CAPSULE    Take 2 g by mouth once daily.      FUROSEMIDE (LASIX) 20 MG TABLET    TAKE 1 TABLET BY MOUTH EVERY DAY    METOPROLOL TARTRATE (LOPRESSOR) 50 MG TABLET    TAKE 1 TABLET BY MOUTH TWO TIMES A DAY    ROSUVASTATIN (CRESTOR) 10 MG TABLET    TAKE 1 TABLET BY MOUTH EVERY EVENING   Modified Medications    No medications on file   Discontinued Medications    No medications on file

## 2022-08-31 NOTE — PROGRESS NOTES
Subjective:   Patient ID:  Yanni Car is a 81 y.o. female who presents to establish care for  Peripheral Arterial Disease, Carotid Artery Disease, Hyperlipidemia, Hypertension, and Non-healing Wound      HPI:       Yanni Car 81 y.o. female is here accompanied by her  by recommendation of her care team for right great toe gangrene.  Wound started nearly 6 weeks ago.  She has no recollection of any trauma. She has 7/10 pain with intermittent jolting sensation. Daf[place] IV; [place] 5.         She has a past history of dafne IIb R calf claudication. She was previously under the care of Dr. Valerio. She had PAF several years ago during an hospitalization with SIRS. She was on on coumadin and amiodarone for 2 years. She in NSR She has PAD s/p failed SVG R fem-pop in 8/2013 with bilateral SFA CTOs. She smokes and is very interested in quitting. She takes aspirin 81 mg daily. No CVA/TIA.       ECG 8/2018: sinus bradycardia       Afib was related to SIRS in 1/2012 while she was admitted for pneumonia    Carotid US 9/2016   Patent bilateral ICA   Patent L vertebral   Retrograde flow R vertebral concerning for steal syndrome    Event monitor 8/2016   No afib      Echo 2016:   Normal EF      S/p failed R reverse vein fem-pop done 8/2013   Failure within 2 to 4 weeks         JUDITH 10/2013     R 0.76   L 0.79      US 2013    R , pSFA 112, mSFA 259, dSFA 32, POP 48, PT 38, AT 27, DP 23  Failed fem-pop + R SFA       US 4/2019   Failed R fem-pop   Bilateral SFA CTOs            JUDITH 1/2019     R NC with TBI 0.41   L 1.28 with TBI 0.41   Monophasic waveforms bilaterally       JUDITH 2/2020     NC vessels bilaterally         LE US 7/2022     High grade L JET stenosis    High R EIA stenosis   Bilateral SFA    Patent BTK vessels        UE US 7/2022     No significant disease           Patient Active Problem List    Diagnosis Date Noted    Critical lower limb ischemia 08/31/2022    Decreased strength of lower  extremity 2020    Decreased range of motion of both hips 2020    Gait abnormality 2020    Acute fracture 2020    Closed fracture of left inferior pubic ramus     Closed nondisplaced fracture of anterior wall of left acetabulum     Wheezing     Essential hypertension     Fx sacrum/coccyx-closed 2020    Fracture of third lumbar vertebra 2020    Glaucoma, right eye 2020    CKD (chronic kidney disease) stage 3, GFR 30-59 ml/min 2020    Common bile duct dilatation 2020    Long term current use of anticoagulant therapy 2016    PAD (peripheral artery disease) 2016       Endovascular intervention 2013    S/p PTAS of L JET 8 x 20 mm Everflex SES at [place]  S/p R CFA PTAS with 7 x 30 mm Everflex SES at [place]       S/p failed R reverse vein fem-pop done 2013   Failure within 2 to 4 weeks         JUDITH 10/2013     R 0.76   L 0.79      US       R , pSFA 112, mSFA 259, dSFA 32, POP 48, PT 38, AT 27, DP 23  Failed fem-pop          JUDITH 2019     R NC with TBI 0.41   L 1.28 with TBI 0.41   Monophasic waveforms bilaterally       Tobacco use 2016    Hyperlipidemia 2016    Paroxysmal A-fib 2016     In NSR today-on amiodarone  [id number] 4 with 4% risk of yearly CVA    Afib was related to SIRS in 2012  She was admitted for pneumonia      2019:    Normal EF   Normal diastolic parameters   No valvular disease                  Bilateral carotid bruits 2016         US      R ICA  cm/sec   L ICA PSV 93 cm/sec   Retrograde R vertebral   Antegrade L vertebral             BCC (basal cell carcinoma), scalp/neck 2012    Post-operative state 2012    Mohs defect of eyelid 2012    Basal cell carcinoma of eyelid 09/10/2012    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   HENT:  Negative for congestion.    Eyes:  Negative for blurred vision, discharge and double vision.   Cardiovascular:  Positive for leg swelling. Negative for chest pain, claudication (R >> L ), cyanosis, dyspnea on exertion, irregular heartbeat, near-syncope, orthopnea, palpitations, paroxysmal nocturnal dyspnea and syncope.   Respiratory:  Negative for cough, shortness of breath and wheezing.    Endocrine: Negative for cold intolerance, heat intolerance and polyphagia.   Hematologic/Lymphatic: Negative for adenopathy and bleeding problem. Does not bruise/bleed easily.   Skin:  Negative for dry skin and nail changes.   Musculoskeletal:  Negative for arthritis, back pain, falls, joint pain, myalgias and neck pain.   Gastrointestinal:  Negative for bloating, abdominal pain, change in bowel habit and constipation.   Genitourinary:  Negative for bladder incontinence, dysuria, flank pain, genital sores and missed menses.   Neurological:  Negative for aphonia, brief paralysis, difficulty with concentration, dizziness and weakness.   Psychiatric/Behavioral:  Negative for altered mental status and memory loss. The patient does not have insomnia.    Allergic/Immunologic: Negative for environmental allergies.     Objective:   Physical Exam  Constitutional:       Appearance: She is well-developed.      Interventions: She is not intubated.  HENT:      Head: Normocephalic and atraumatic.      Right Ear: External ear normal.      Left Ear: External ear normal.   Eyes:      General: No scleral icterus.        Right eye: No discharge.         Left eye: No discharge.      Conjunctiva/sclera: Conjunctivae normal.      Pupils: Pupils are equal, round, and reactive to light.   Neck:      Thyroid: No thyromegaly.      Vascular: Normal carotid pulses. No carotid bruit, hepatojugular reflux or JVD.      Trachea: No tracheal deviation.    Cardiovascular:      Rate and Rhythm: Normal rate and regular rhythm. No extrasystoles are present.     Chest Wall: PMI is not displaced.      Pulses:           Carotid pulses are 2+ on the right side with bruit and 2+ on the left side with bruit.       Radial pulses are 2+ on the right side and 2+ on the left side.        Femoral pulses are 2+ on the right side and 2+ on the left side.       Popliteal pulses are 2+ on the right side and 2+ on the left side.        Dorsalis pedis pulses are 0 on the right side and 0 on the left side.        Posterior tibial pulses are 0 on the right side and 0 on the left side.      Heart sounds: S1 normal and S2 normal. Heart sounds not distant. No midsystolic click. No murmur heard.    No friction rub. No gallop. No S3 sounds.      Comments: Monophasic R PT and biphasic R DP doppler signals     Biphasic L PT and DP doppler signals       Pulmonary:      Effort: Pulmonary effort is normal. No tachypnea, bradypnea, accessory muscle usage or respiratory distress. She is not intubated.      Breath sounds: Normal breath sounds. No stridor. No decreased breath sounds, wheezing or rales.   Chest:      Chest wall: No tenderness.   Abdominal:      General: There is no distension or abdominal bruit.      Palpations: There is no mass or pulsatile mass.      Tenderness: There is no abdominal tenderness. There is no guarding or rebound.   Musculoskeletal:         General: No tenderness. Normal range of motion.      Cervical back: Normal range of motion and neck supple.   Lymphadenopathy:      Cervical: No cervical adenopathy.      Comments: Trace edema of bilateral ankles   Skin:     General: Skin is warm.      Coloration: Skin is not pale.      Findings: No erythema or rash.      Comments:     R great toe gangrene     See images                  Neurological:      Mental Status: She is alert and oriented to person, place, and time.      Cranial Nerves: No cranial nerve deficit.       Coordination: Coordination normal.      Deep Tendon Reflexes: Reflexes are normal and symmetric.   Psychiatric:         Behavior: Behavior normal.         Thought Content: Thought content normal.         Judgment: Judgment normal.       8/2022                        Assessment:     1. Critical lower limb ischemia    2. Bilateral carotid bruits    3. Essential hypertension    4. Mixed hyperlipidemia    5. PAD (peripheral artery disease)    6. Tobacco use    7. Stage 3 chronic kidney disease, unspecified whether stage 3a or 3b CKD          Plan:         Obtain JUDITH with TBI, complete right foot x-ray, ESR, CRP, lipid profile, CMP, CBC, and hemoglobin A1c.        She will have her angiogram with possible revascularization.  Diagnostic angiogram will be done via left radial access then determine revascularization strategy.       We had a long discussion regarding the significance of peripheral arterial disease and critical limb ischemia and their association with limb loss and high mortality.       Referral to wound care.  Guideline directed medical therapy for peripheral arterial disease.        Low salt diet  Exercise  Smoking cessation enrollment  Intense statin therapy  Target BP < 130/80 mmHg      Yearly carotid ultrasound for bruit  Pletal 100 mg bid for claudication          Smoking cessation was discussed  She was not interested in quitting smoking in the past however after discussing the overall clinical implication critical limb ischemia she is open to the idea of smoking cessation.  Smoking cessation referral was placed.              Continue with current medical plan and lifestyle changes.  Return sooner for concerns or questions. If symptoms persist go to the ED  I have reviewed all pertinent data on this patient       I have reviewed the patient's medical history in detail and updated the computerized patient record.    Orders Placed This Encounter   Procedures    X-Ray Foot Complete Right     Standing  Status:   Future     Number of Occurrences:   1     Standing Expiration Date:   8/31/2023     Order Specific Question:   May the Radiologist modify the order per protocol to meet the clinical needs of the patient?     Answer:   Yes     Order Specific Question:   Release to patient     Answer:   Immediate    Sedimentation rate     Standing Status:   Future     Number of Occurrences:   1     Standing Expiration Date:   10/30/2023    C-REACTIVE PROTEIN     Standing Status:   Future     Number of Occurrences:   1     Standing Expiration Date:   10/30/2023    CBC Auto Differential     Standing Status:   Future     Number of Occurrences:   1     Standing Expiration Date:   10/30/2023    Comprehensive Metabolic Panel     Standing Status:   Future     Number of Occurrences:   1     Standing Expiration Date:   10/30/2023    Lipid Panel     Standing Status:   Future     Number of Occurrences:   1     Standing Expiration Date:   10/30/2023    Ambulatory referral/consult to Wound Clinic     Standing Status:   Future     Standing Expiration Date:   9/30/2023     Referral Priority:   Routine     Referral Type:   Consultation     Referral Reason:   Specialty Services Required     Requested Specialty:   Wound Care     Number of Visits Requested:   1    Ambulatory referral/consult to Smoking Cessation Program     Standing Status:   Future     Standing Expiration Date:   9/30/2023     Referral Priority:   Routine     Referral Type:   Consultation     Referral Reason:   Specialty Services Required     Requested Specialty:   CTTS     Number of Visits Requested:   1    Ankle Brachial Indices (JUDITH)     Standing Status:   Future     Standing Expiration Date:   8/31/2023     Order Specific Question:   Exam Type:     Answer:   Resting with toe tracings     Order Specific Question:   Release to patient     Answer:   Immediate    Case Request-Cath Lab: AORTOGRAM, WITH SERIALOGRAPHY     Standing Status:   Standing     Number of Occurrences:    1     Order Specific Question:   CPT Code:     Answer:   UT  ANGIO AORTOBIFEMORAL W CATH [68055]     Order Specific Question:   CPT Code:     Answer:   UT REVASCULARIZE ILIAC ARTERY,ANGIOPLASTY/STENT, INITIAL VESSEL [07413]     Order Specific Question:   CPT Code:     Answer:   UT CORONARY IVUS ONLY [[id number]]     Order Specific Question:   Medical Necessity:     Answer:   Medically Urgent [101]     Order Specific Question:   Is an on-site pathologist required for this procedure?     Answer:   N/A         Follow up as scheduled. Return sooner for concerns or questions            She expressed verbal understanding and agreed with the plan        Greater than 50% of the visit of 45 minutes was spent counseling, educating, and coordinating the care of the patient.      -In today's visit, at least 4 established conditions that pose a risk to life or bodily function have been addressed and the conditions are severe.    -In today's visit, monitoring for drug toxicity was accomplished.            Patient's Medications   New Prescriptions    No medications on file   Previous Medications    AMIODARONE (PACERONE) 200 MG TAB    TAKE 1 TABLET BY MOUTH EVERY DAY    AMLODIPINE (NORVASC) 10 MG TABLET    TAKE 1 TABLET BY MOUTH ONCE A DAY    ASPIRIN (ECOTRIN) 81 MG EC TABLET    Take 81 mg by mouth once daily.      CALCIUM-VITAMIN D3 (OS-NOELLE 500 + D3) 500 MG-5 MCG (200 UNIT) PER TABLET    Take 1 tablet by mouth 2 (two) times daily with meals.    CILOSTAZOL (PLETAL) 100 MG TAB    TAKE 1 TABLET BY MOUTH TWO TIMES A DAY    FISH OIL-OMEGA-3 FATTY ACIDS 300-1,000 MG CAPSULE    Take 2 g by mouth once daily.      FUROSEMIDE (LASIX) 20 MG TABLET    TAKE 1 TABLET BY MOUTH EVERY DAY    METOPROLOL TARTRATE (LOPRESSOR) 50 MG TABLET    TAKE 1 TABLET BY MOUTH TWO TIMES A DAY    ROSUVASTATIN (CRESTOR) 10 MG TABLET    TAKE 1 TABLET BY MOUTH EVERY EVENING   Modified Medications    No medications on file   Discontinued Medications    No medications  on file

## 2022-09-01 ENCOUNTER — HOSPITAL ENCOUNTER (OUTPATIENT)
Facility: HOSPITAL | Age: 82
Discharge: HOME | End: 2022-09-01
Attending: INTERNAL MEDICINE | Admitting: INTERNAL MEDICINE
Payer: MEDICARE

## 2022-09-01 VITALS
OXYGEN SATURATION: 95 % | WEIGHT: 170 LBS | HEIGHT: 67 IN | HEART RATE: 56 BPM | SYSTOLIC BLOOD PRESSURE: 127 MMHG | TEMPERATURE: 98 F | RESPIRATION RATE: 15 BRPM | BODY MASS INDEX: 26.68 KG/M2 | DIASTOLIC BLOOD PRESSURE: 62 MMHG

## 2022-09-01 DIAGNOSIS — I73.9 PAD (PERIPHERAL ARTERY DISEASE): ICD-10-CM

## 2022-09-01 DIAGNOSIS — I70.229 CRITICAL LOWER LIMB ISCHEMIA: Primary | ICD-10-CM

## 2022-09-01 DIAGNOSIS — Z01.818 PRE-OP TESTING: ICD-10-CM

## 2022-09-01 LAB
ANION GAP SERPL CALC-SCNC: 9 MMOL/L (ref 8–16)
BUN SERPL-MCNC: 10 MG/DL (ref 8–23)
CALCIUM SERPL-MCNC: 8.7 MG/DL (ref 8.7–10.5)
CHLORIDE SERPL-SCNC: 112 MMOL/L (ref 95–110)
CO2 SERPL-SCNC: 20 MMOL/L (ref 23–29)
CREAT SERPL-MCNC: 1 MG/DL (ref 0.5–1.4)
EST. GFR  (NO RACE VARIABLE): 57 ML/MIN/1.73 M^2
GLUCOSE SERPL-MCNC: 90 MG/DL (ref 70–110)
POTASSIUM SERPL-SCNC: 3.7 MMOL/L (ref 3.5–5.1)
SODIUM SERPL-SCNC: 141 MMOL/L (ref 136–145)

## 2022-09-01 PROCEDURE — C1769 GUIDE WIRE: HCPCS | Performed by: INTERNAL MEDICINE

## 2022-09-01 PROCEDURE — 25000003 PHARM REV CODE 250: Performed by: STUDENT IN AN ORGANIZED HEALTH CARE EDUCATION/TRAINING PROGRAM

## 2022-09-01 PROCEDURE — 93010 EKG 12-LEAD: ICD-10-PCS | Mod: ,,, | Performed by: INTERNAL MEDICINE

## 2022-09-01 PROCEDURE — 99152 PR MOD CONSCIOUS SEDATION, SAME PHYS, 5+ YRS, FIRST 15 MIN: ICD-10-PCS | Mod: ,,, | Performed by: INTERNAL MEDICINE

## 2022-09-01 PROCEDURE — C1887 CATHETER, GUIDING: HCPCS | Performed by: INTERNAL MEDICINE

## 2022-09-01 PROCEDURE — 93005 ELECTROCARDIOGRAM TRACING: CPT | Mod: 59

## 2022-09-01 PROCEDURE — 93010 ELECTROCARDIOGRAM REPORT: CPT | Mod: ,,, | Performed by: INTERNAL MEDICINE

## 2022-09-01 PROCEDURE — 99153 MOD SED SAME PHYS/QHP EA: CPT | Performed by: INTERNAL MEDICINE

## 2022-09-01 PROCEDURE — 36415 COLL VENOUS BLD VENIPUNCTURE: CPT | Performed by: STUDENT IN AN ORGANIZED HEALTH CARE EDUCATION/TRAINING PROGRAM

## 2022-09-01 PROCEDURE — 75710 ARTERY X-RAYS ARM/LEG: CPT | Performed by: INTERNAL MEDICINE

## 2022-09-01 PROCEDURE — 99152 MOD SED SAME PHYS/QHP 5/>YRS: CPT | Performed by: INTERNAL MEDICINE

## 2022-09-01 PROCEDURE — 75710 ARTERY X-RAYS ARM/LEG: CPT | Mod: 26,,, | Performed by: INTERNAL MEDICINE

## 2022-09-01 PROCEDURE — 36200 PLACE CATHETER IN AORTA: CPT | Mod: RT,,, | Performed by: INTERNAL MEDICINE

## 2022-09-01 PROCEDURE — 63600175 PHARM REV CODE 636 W HCPCS: Performed by: INTERNAL MEDICINE

## 2022-09-01 PROCEDURE — 36200 PLACE CATHETER IN AORTA: CPT | Mod: RT | Performed by: INTERNAL MEDICINE

## 2022-09-01 PROCEDURE — 75710 PR  ANGIO EXTREMITY UNILAT: ICD-10-PCS | Mod: 26,,, | Performed by: INTERNAL MEDICINE

## 2022-09-01 PROCEDURE — 36200 PR PLACE CATH AORTA: ICD-10-PCS | Mod: RT,,, | Performed by: INTERNAL MEDICINE

## 2022-09-01 PROCEDURE — 25000003 PHARM REV CODE 250: Performed by: INTERNAL MEDICINE

## 2022-09-01 PROCEDURE — C1894 INTRO/SHEATH, NON-LASER: HCPCS | Performed by: INTERNAL MEDICINE

## 2022-09-01 PROCEDURE — 99152 MOD SED SAME PHYS/QHP 5/>YRS: CPT | Mod: ,,, | Performed by: INTERNAL MEDICINE

## 2022-09-01 PROCEDURE — 80048 BASIC METABOLIC PNL TOTAL CA: CPT | Performed by: STUDENT IN AN ORGANIZED HEALTH CARE EDUCATION/TRAINING PROGRAM

## 2022-09-01 RX ORDER — HEPARIN SODIUM 1000 [USP'U]/ML
INJECTION, SOLUTION INTRAVENOUS; SUBCUTANEOUS
Status: DISCONTINUED | OUTPATIENT
Start: 2022-09-01 | End: 2022-09-01 | Stop reason: HOSPADM

## 2022-09-01 RX ORDER — DIPHENHYDRAMINE HYDROCHLORIDE 50 MG/ML
INJECTION INTRAMUSCULAR; INTRAVENOUS
Status: DISCONTINUED | OUTPATIENT
Start: 2022-09-01 | End: 2022-09-01 | Stop reason: HOSPADM

## 2022-09-01 RX ORDER — CLOPIDOGREL BISULFATE 75 MG/1
75 TABLET ORAL DAILY
Qty: 30 TABLET | Refills: 11 | Status: SHIPPED | OUTPATIENT
Start: 2022-09-02 | End: 2022-10-26 | Stop reason: SDUPTHER

## 2022-09-01 RX ORDER — LIDOCAINE HYDROCHLORIDE 10 MG/ML
INJECTION, SOLUTION EPIDURAL; INFILTRATION; INTRACAUDAL; PERINEURAL
Status: DISCONTINUED | OUTPATIENT
Start: 2022-09-01 | End: 2022-09-01 | Stop reason: HOSPADM

## 2022-09-01 RX ORDER — FENTANYL CITRATE 50 UG/ML
INJECTION, SOLUTION INTRAMUSCULAR; INTRAVENOUS
Status: DISCONTINUED | OUTPATIENT
Start: 2022-09-01 | End: 2022-09-01 | Stop reason: HOSPADM

## 2022-09-01 RX ORDER — ACETAMINOPHEN 325 MG/1
650 TABLET ORAL EVERY 4 HOURS PRN
Status: DISCONTINUED | OUTPATIENT
Start: 2022-09-01 | End: 2022-09-01 | Stop reason: HOSPADM

## 2022-09-01 RX ORDER — ONDANSETRON 4 MG/1
8 TABLET, ORALLY DISINTEGRATING ORAL EVERY 8 HOURS PRN
Status: DISCONTINUED | OUTPATIENT
Start: 2022-09-01 | End: 2022-09-01 | Stop reason: HOSPADM

## 2022-09-01 RX ORDER — CLOPIDOGREL BISULFATE 75 MG/1
600 TABLET ORAL ONCE
Status: DISCONTINUED | OUTPATIENT
Start: 2022-09-01 | End: 2022-09-01 | Stop reason: HOSPADM

## 2022-09-01 RX ORDER — VERAPAMIL HYDROCHLORIDE 2.5 MG/ML
INJECTION, SOLUTION INTRAVENOUS
Status: DISCONTINUED | OUTPATIENT
Start: 2022-09-01 | End: 2022-09-01 | Stop reason: HOSPADM

## 2022-09-01 RX ORDER — SODIUM CHLORIDE 9 MG/ML
INJECTION, SOLUTION INTRAVENOUS
Status: DISCONTINUED | OUTPATIENT
Start: 2022-09-01 | End: 2022-09-01 | Stop reason: HOSPADM

## 2022-09-01 RX ORDER — MIDAZOLAM HYDROCHLORIDE 1 MG/ML
INJECTION, SOLUTION INTRAMUSCULAR; INTRAVENOUS
Status: DISCONTINUED | OUTPATIENT
Start: 2022-09-01 | End: 2022-09-01 | Stop reason: HOSPADM

## 2022-09-01 RX ORDER — SODIUM CHLORIDE 9 MG/ML
INJECTION, SOLUTION INTRAVENOUS CONTINUOUS
Status: DISCONTINUED | OUTPATIENT
Start: 2022-09-01 | End: 2022-09-01 | Stop reason: HOSPADM

## 2022-09-01 RX ORDER — HEPARIN SODIUM 200 [USP'U]/100ML
INJECTION, SOLUTION INTRAVENOUS
Status: DISCONTINUED | OUTPATIENT
Start: 2022-09-01 | End: 2022-09-01 | Stop reason: HOSPADM

## 2022-09-01 RX ORDER — SODIUM CHLORIDE 9 MG/ML
INJECTION, SOLUTION INTRAVENOUS CONTINUOUS
Status: ACTIVE | OUTPATIENT
Start: 2022-09-01 | End: 2022-09-01

## 2022-09-01 RX ADMIN — SODIUM CHLORIDE: 0.9 INJECTION, SOLUTION INTRAVENOUS at 12:09

## 2022-09-01 RX ADMIN — SODIUM CHLORIDE: 0.9 INJECTION, SOLUTION INTRAVENOUS at 08:09

## 2022-09-01 NOTE — Clinical Note
An angiography was performed of the distal infrarenal abdominal aorta  via power injection. Injection was performed with 30 mL contrast at 15 mL/s.  Focused on iliacs

## 2022-09-01 NOTE — Clinical Note
A percutaneous stick to the left radial artery was performed. Ultrasound guidance was used to obtain access.

## 2022-09-01 NOTE — PLAN OF CARE
Pt arrived with family from home, ambulates with walker assistance. Patient lying in bed with no complaints of pain or distress noted. Monitors applied. VSS, AAOx4. EKG completed at bedside. Yellow non-skid socks placed on patient. Fall risk reviewed with patient.    MD consent completed at bedside. Procedure and recovery process explained to patient and all questions answered. Patient verbalized understanding, denies questions. Will continue to monitor for safety and needs.

## 2022-09-01 NOTE — PROCEDURES
Brief Operative Note:    : Julio Nguyen MD     Referring Physician: Julio Nguyen     All Operators: Surgeon(s):  MD Carol Mahoney MD     Preoperative Diagnosis: Critical lower limb ischemia [I70.229]     Postop Diagnosis: Critical lower limb ischemia [I70.229]    Treatments/Procedures: Procedure(s) (LRB):  AORTOGRAM, WITH SERIALOGRAPHY (N/A)  Placement of Closure Device    Access: Left radial artery    Findings:Severe peripheral artery disease is present.     Distal aorta patent  L JET high grade heavily calcified lesion at bifurcation w/in previous stent  L EIA patent  L IIA occluded  L CFA high grade lesion   L SFA and BTK vessels not studied  R JET patent  R EIA high grade lesion (w/ ~60 mmHg gradient across during catheter pull back)   R IIA occluded  R CFA patent within stent  R PFA high grade heavily calcified lesion w/in ostial-prox segment  R SFA high grade heavily calcified lesion w/in ostial-prox segment and   in mid-distal segment filled by collaterals  R POP high grade calcified lesion   R AT patent  R TPT patent  R PER patent  R PT high grade lesion within prox-mid segment, collateralized       See catheterization report for full details.    Intervention: None, diagnostic only.    See catheterization report for full details.    Closure device:  VascBand        Plan:  - Post cath protocol   - IVF @ 150 cc/hr x 2 hours  - Continue aspirin 81 mg daily indefinitely  - Plavix load before discharge and continue 75 mg daily from tomorrow  - Continue high intensity statin therapy (LDL goal < 70)  - Bring back for staged peripheral intervention of RLE with GENERAL ANESTHESIA     Estimated Blood loss: 20 cc    Specimens removed: No    Carol Gerardo MD

## 2022-09-01 NOTE — DISCHARGE SUMMARY
Discharge Summary  Interventional Cardiology      Admit Date: 9/1/2022    Discharge Date:  9/1/2022    Attending Physician: No att. providers found    Discharge Physician: Carol Gerardo MD    Principal Diagnoses: <principal problem not specified>  Indication for Admission: AORTOGRAM, WITH SERIALOGRAPHY (N/A), Placement of Closure Device    Discharged Condition: Good    Hospital Course:   Patient presented for outpatient peripheral angiogram which went without complication. Peripheral angiogram revealed severe peripheral arterial disease . See brief op note and full cath report in Epic for details. Hemostasis of patient's L Radial access site was achieved with VascBand. Patient was monitored per post-cath protocol, and her L radial access site was c/d/i with no hematoma. Patient was able to ambulate without difficulty. She was feeling well and anticipating discharge home today.     Outpatient Plan:  - Continue EC aspirin 81 mg daily  - Started Plavix 75 mg daily on discharge in anticipation for staged peripheral intervention. She received loading dose before discharge today.  - Bring back for staged peripheral intervention of RLE with GENERAL ANESTHESIA. Access site TBD.    Diet: Cardiac diet    Activity: Ad chad, wound care instructions provided    Disposition: Home or Self Care      Discharge Medications:      Medication List        START taking these medications      clopidogreL 75 mg tablet  Commonly known as: PLAVIX  Take 1 tablet (75 mg total) by mouth once daily.  Start taking on: September 2, 2022            CONTINUE taking these medications      amiodarone 200 MG Tab  Commonly known as: PACERONE  TAKE 1 TABLET BY MOUTH EVERY DAY     amLODIPine 10 MG tablet  Commonly known as: NORVASC  TAKE 1 TABLET BY MOUTH ONCE A DAY     aspirin 81 MG EC tablet  Commonly known as: ECOTRIN     calcium-vitamin D3 500 mg-5 mcg (200 unit) per tablet  Commonly known as: OS-NOELLE 500 + D3     cilostazoL 100 MG Tab  Commonly  known as: PLETAL  TAKE 1 TABLET BY MOUTH TWO TIMES A DAY     fish oil-omega-3 fatty acids 300-1,000 mg capsule     furosemide 20 MG tablet  Commonly known as: LASIX  TAKE 1 TABLET BY MOUTH EVERY DAY     metoprolol tartrate 50 MG tablet  Commonly known as: LOPRESSOR  TAKE 1 TABLET BY MOUTH TWO TIMES A DAY     rosuvastatin 10 MG tablet  Commonly known as: CRESTOR  TAKE 1 TABLET BY MOUTH EVERY EVENING               Where to Get Your Medications        These medications were sent to [facility] [address], [place] [address]      Hours: Mon-Fri, 8a-5:30p Phone: [phone]   clopidogreL 75 mg tablet

## 2022-09-01 NOTE — PLAN OF CARE
Remaining air removed from L radial vasc band. Gauze and tegaderm dressing applied. Site is CDI. No bleeding or hematoma noted around site. Site and surrounding areas soft. +2 miracle radial pulses palpated. Skin normal in color, warm to touch, < 3 sec cap refill. Will continue to monitor pt.

## 2022-09-01 NOTE — PLAN OF CARE
Patient discharged to home as ordered after 2 hour recovery per Dr. Nguyen. Pt is AAOx4, VSS, denies any pain. All discharge instructions and printed materials reviewed and given to patient.   Patient instructed on follow-up appointment as ordered.   Prescription for Plavix delivered to bedside by pharmacy and reviewed with patient. Patient verbalized understanding and agreement with all discharge instructions given.     L radial gauze and tegaderm dressing c.d.i. No bleeding or hematoma noted around site. Site and surrounding area is soft.    Palpated +2 miracle radial pulses.     Pt got dressed and moving around without difficulty and no distress noted. Wheeled to and ambulated into restroom without difficulty.  Pt in w/c. Right AC 20 gauge iv dc'd as ordered with tip intact. Gauze and koban dressing applied c.d.i. Pt given meal tray to take home. Pt discharged home via wheelchair to private vehicle by pt's .

## 2022-09-01 NOTE — PLAN OF CARE
2 cc of air removed from L radial vasc band. No hematoma or bleeding noted. +2 miracle radial pulses palpated. Skin is normal in color, warm to touch, < 3 sec cap refill. VSS. Will continue to monitor pt for safety and needs.

## 2022-09-01 NOTE — Clinical Note
An angiography of the  left lower extremity was performed with the catheter and hand injected with 40 mL of contrast

## 2022-09-01 NOTE — INTERVAL H&P NOTE
The patient has been examined and the H&P has been reviewed:    I concur with the findings and no changes have occurred since H&P was written.    Anesthesia/Surgery risks, benefits and alternative options discussed and understood by patient/family.      Proceed with peripheral angiography in patient with right lower extremity CLI.    Access: LEFT radial     Antiplatelets: ASA + Pletal     The risks, the potential benefits of the procedure, and the alternatives to the procedure, were discussed in detail and accepted by the patient. Informed consent was obtained & the patient is agreeable to proceed with the procedure. All patient's questions were answered.       Active Hospital Problems    Diagnosis  POA    Essential hypertension [I10]  Yes    CKD (chronic kidney disease) stage 3, GFR 30-59 ml/min [N18.30]  Yes    PAD (peripheral artery disease) [I73.9]  Yes       S/p failed R reverse vein fem-pop done 8/2013   Failure within 2 to 4 weeks         JUDITH 10/2013     R 0.76   L 0.79      US 2013      R , pSFA 112, mSFA 259, dSFA 32, POP 48, PT 38, AT 27, DP 23  Failed fem-pop          JUDITH 1/2019     R NC with TBI 0.41   L 1.28 with TBI 0.41   Monophasic waveforms bilaterally       Hyperlipidemia [E78.5]  Yes      Resolved Hospital Problems   No resolved problems to display.

## 2022-09-01 NOTE — PLAN OF CARE
Patient transfered to cath lab bay 4 via stretcher with side rails up x2. Pt AAOx4 and able to follow commands. Pt is stable when connecting to cardiac monitors. VSS. NADN.     Left radial vasc band in place with 12cc of air in band. Site is CDI. No redness, bruising, or hematoma noted around site. +2 miracle radial pulses palpated. Palpated miracle pedal pulses. Skin normal in color and warm to touch, <3 sec cap refill. Fall risk precautions given and patient acknowledges. AIDET completed to pt. Cath RN and MD updated pt's  in sx waiting room. Will continue to monitor patient.

## 2022-09-01 NOTE — DISCHARGE INSTRUCTIONS
Discharge Instructions:    Do not drive a car, operate heavy equipment, care for a young child, etc for the next 12-24 hours.   Avoid drinking alcohol for 24 hours.  Do not make any important decisions for 24 hours.  Drink fluids to keep hydrated. Resume your usual diet as tolerated.   Rest for today then activity as tolerated.   Do not lift anything over 5 pounds for the first 3 days after procedure.    Remove dressing tomorrow Friday 9/2/2022 at 1:30PM then may shower with warm soapy water.  Do not scrub site. Pat dry. May apply bandaid for 2 days. No tub baths.  Do not submerge wound in water for 3 days.     Call MD for any unrelieved pain, excessive nausea or vomiting, redness around site, bleeding, or pus or foul smelling drainage, or any other questions or concerns. [phone]. CALL 911 and Go to the ER for any difficulty breathing or chest pain.    If site swells or bleeds, hold direct pressure to area for 10 full minutes.   If site continues to bleed, continue to hold pressure to site and have someone bring you to the ER.      Follow any additional instructions given to you by MD. Call MD to schedule a follow up appointment, or follow up as instructed.    Understanding Transradial Cardiac Catheterization      Cardiac catheterization (cardiac cath) is a common, non-surgical procedure. During the procedure, your doctor will insert a long, thin tube (catheter) into an artery and move it up into your heart. Transradial means the catheter is inserted into an artery in the wrist (the radial artery). This procedure can be used to diagnose and treat certain heart problems.  Why do I need a transradial cardiac cath?  You may need a cardiac cath if signs indicate a problem with your heart. These may include:  Symptoms of chest pain, tightness, or heaviness (known as angina). This is a common symptom of blocked heart arteries, known as coronary artery disease.  Symptoms of weakness, dizziness, trouble breathing, or  swollen legs or feet. These may be symptoms of a problem with a heart valve or the heart muscle.  Other test results show heart problems. Tests may include stress tests, heart scans, and echocardiography.  During a cardiac cath, your doctor can see the condition of the coronary arteries and heart valves. He or she can also check how well the heart pumps and the flow of blood through the heart. Your doctor can also measure pressures and take blood samples. And, if needed, he or she can open blocked arteries. This can help reduce symptoms of angina.  Cardiac cath is often done using a catheter inserted into an artery in the groin. During transradial cardiac cath, the catheter is inserted into an artery in the wrist. This can mean less bleeding and a faster recovery. Some people may have blockages in the groin arteries as well as in the heart arteries, making it difficult to reach the heart. The transradial approach can be used to get around this problem.   What happens during a transradial cardiac cath?  The procedure is done in the hospital or a surgery center. First, an IV line is put in your arm or hand to deliver fluids and medicines. You will likely be given medicine to relax you and make you drowsy. When the procedure begins:  You lie on an X-ray table.  The skin over the insertion site in your wrist is numbed.  The doctor makes a tiny puncture or incision into the artery in the wrist. He or she then inserts a catheter and threads it through the blood vessel into your heart.    The doctor may inject a contrast fluid through the catheter into the arteries. This fluid makes the arteries show up better on X-rays.  Tests may be done to check the condition of your heart and arteries. If needed, the doctor can clear blockages in the arteries or do other repairs.  When the doctor is finished, he or she will remove the catheter and put direct pressure on the site to prevent bleeding.  You will stay for a time to  recover, and then go home.  What are the risks of transradial cardiac cath?  These include:  Bleeding, bruising, infection, or blood clots  Damage to the radial artery that may cause injury to the hand  Allergic reaction to the contrast fluid  Abnormal heartbeat (arrhythmia)  Damage to blood vessels or tissues  Kidney damage or failure  The need for emergency heart surgery  Heart attack, stroke, or death  Date Last Reviewed: 5/1/2016 © 2000-2017 [employer]. [address] [place] [address]. All rights reserved. This information is not intended as a substitute for professional medical care. Always follow your healthcare professional's instructions.    Bleeding or Hematoma After Cardiac Catheterization  You recently had cardiac catheterization. A catheter was put into your body through a puncture site in your groin or arm. You now have bleeding from this site. When bleeding occurs, it may drip or spurt from the site. Or it may collect in a lump (hematoma) under the skin. In the emergency department, pressure will be put on the site to stop bleeding. You will be sent home when the bleeding stops. To prevent repeat bleeding, take some precautions at home. If the bleeding starts again, follow the advice below.      Home care  For the next 48 hours:  Don't do any strenuous activity.  Don't climb stairs.  Don't lift anything heavy.  If the puncture site is in your arm or wrist, don't lie on that arm.  If your puncture site is in the groin, don't strain at bowel movements.  Don't scrub the site when bathing. It is fine to get it wet after your healthcare provider says it is OK, but don't scrub it or massage it.  If bleeding happens again, call 911. Take the following steps to stop the bleeding until help arrives:  Lie on your back.  Place a clean cloth or gauze pad over the puncture site. Then hold firm pressure right on the site. Or have someone else apply firm pressure using the gauze pad or  "washcloth.  Keep your arm straight and raised above the level of your heart if the site is in your arm or wrist. If the site is in your groin, have someone else hold pressure on the site. If you dont have someone to do this, put a 5-pound bag of rice or flour on the site and apply pressure with your hand over the bag.  Don't press too hard! If you press too hard, your leg and foot (or arm and hand) will not get blood flow and the skin under your toenails or fingernails may turn white. The skin should look pink, like the toenails on your other foot. When you (or someone) presses on the toenail it will turn white, but when you stop pressing on the nail it will turn pink again. This is called "capillary refill." If there is someone with you, he or she can check it.  If your toes, foot, or leg start feeling numb, tingly, or cold, ease up on the pressure, because you are probably pressing too hard.  As soon as emergency care arrives, they will take over your care.  Follow-up care  Follow up with your healthcare provider, or as advised. Ask your healthcare provider for a contact number to call.  Call 911  Call 911 if any of these occur:  Chest pain or pressure  Any bleeding from the site  Feeling weak or faint  Trouble breathing  Lump (hematoma) is quickly getting larger  Coolness, numbness, tingling, or skin color changes in the leg or arm with the puncture site  When to seek medical advice  Call your healthcare provider right away if any of these occur:  Increased pain, redness, swelling, or drainage from the puncture site  Nausea or vomiting  Date Last Reviewed: 1/11/2016  © 2963-3417 [employer]. [address] [place] [address], [place] [address]. All rights reserved. This information is not intended as a substitute for professional medical care. Always follow your healthcare professional's instructions.    Discharge Instructions for Cardiac Catheterization  Cardiac catheterization is a procedure to look for " blocked areas in the blood vessels that send blood to the heart. A thin, flexible tube (catheter) is put in a blood vessel in your groin or arm. The healthcare provider injects contrast fluid into your blood, which then flows to your heart. X-rays pictures are taken of your heart. Your provider will review the results with you. Be sure to ask any questions you have before you leave. This sheet will help you take care of yourself at home.  Home care  Only do light and easy activities for the next 2 to 3 days. Ask for help with chores and errands while you recover. Have someone drive you to your appointments.  Don't lift anything heavy for a while. Your healthcare team will tell you when it's safe to lift again.  Ask your healthcare team when you can expect to return to work. Unless your job involves lifting, you may be able to return to your normal activities within a couple of days.  Take your medicines as directed. Don't skip doses.  Drink 6 to 8 glasses of water a day. This is to help flush the contrast dye out of your body. Call your healthcare team if your urine has any change in color.  Take your temperature each day for 7 days. If you feel cold and clammy or start sweating, take your temperature right away and call your healthcare team.  Check your incisions every day for signs of infection. These include redness, swelling, and drainage. It is normal to have a small bruise or bump where the catheter was inserted. A bruise that is getting larger is not normal and should be reported to your healthcare team. If you see blood forming in the incision, call your healthcare team. Go to the emergency department if you have uncontrolled bleeding from the artery site. This is especially true if you take medicines that make it difficult for your blood to clot. Examples are aspirin, clopidogrel, and warfarin.  Eat a healthy diet. Make sure it is low in fat, salt, and cholesterol. Ask your healthcare team for diet  information.  Stop smoking. Enroll in a stop-smoking program or ask your healthcare team for help. Stop-smoking programs can be life saving.  Exercise as your healthcare team tells you to. Your healthcare team may recommend you start a cardiac rehabilitation program. Cardiac rehab is an exercise program in which trained healthcare staff watch your progress and stress on your heart while you exercise. Ask your team how to enroll.  Don't swim or take baths until your healthcare team says its OK. You can shower the day after the procedure. Keep the site clean and dry. This keeps the incision from getting wet and infected until the skin and artery can heal.  Follow-up care  Make a follow-up appointment as advised by our staff. It's common to have a follow-up appointment 2 to 4 weeks after an angioplasty or coronary stent procedure.  Make a yearly appointment, too. This is to make sure you are still doing well and not having any new symptoms.  Don't wait for a follow-up appointment if your medicines aren't working or you are having heart-related symptoms.  When to seek medical care  Call your healthcare provider right away if you have any of the following:  Chest pain  Constant or increasing pain or numbness in your leg  Fever of 100.4°F (38.0°C) or higher, or as directed by your healthcare provider  Symptoms of infection. These include redness, swelling, drainage, or warmth at the incision site.  Shortness of breath  A leg that feels cold or appears blue  Bleeding, bruising, or a lot of swelling where the catheter was inserted  Blood in your urine  Black or tarry stools  Any unusual bleeding   Date Last Reviewed: 10/1/2016  © 7994-2282 [employer]. [address] [place] [address], [place] [address]. All rights reserved. This information is not intended as a substitute for professional medical care. Always follow your healthcare professional's instructions.

## 2022-09-01 NOTE — Clinical Note
An angiography of the  right lower extremity selectively was performed with the catheter and hand injected with 40 mL of contrast

## 2022-09-01 NOTE — Clinical Note
The site was marked. The right groin and left radial was prepped. The site was prepped with ChloraPrep. The site was clipped. The patient was draped. The patient was positioned supine.

## 2022-09-01 NOTE — Clinical Note
225 ml of contrast were injected throughout the case. 75 mL of contrast was the total wasted during the case. 300 mL was the total amount used during the case.

## 2022-09-01 NOTE — Clinical Note
Iv infiltrated due to patient movement. New IV inserted into L AC no swelling noted and currently infusing

## 2022-09-01 NOTE — Clinical Note
An angiography was performed of the mid and distal infrarenal abdominal aorta  via power injection. Injection was performed with 30 mL contrast at 15 mL/s.

## 2022-09-01 NOTE — PLAN OF CARE
Right forearm wrapped lightly with guido ARNOLD RN as initial IV replacement in cath lab bled and site was swollen. Pt denies pain at site. New IV site R AC is CDI and infusing well.

## 2022-09-01 NOTE — Clinical Note
The groin, left radial and right foot was prepped. The site was prepped with ChloraPrep. The site was clipped. The patient was draped. The patient was positioned supine.

## 2022-09-13 ENCOUNTER — LAB VISIT (OUTPATIENT)
Dept: LAB | Facility: HOSPITAL | Age: 82
End: 2022-09-13
Attending: INTERNAL MEDICINE
Payer: MEDICARE

## 2022-09-13 DIAGNOSIS — Z01.810 PRE-OPERATIVE CARDIOVASCULAR EXAMINATION: ICD-10-CM

## 2022-09-13 LAB
ANION GAP SERPL CALC-SCNC: 11 MMOL/L (ref 8–16)
BASOPHILS # BLD AUTO: 0.06 K/UL (ref 0–0.2)
BASOPHILS NFR BLD: 1 % (ref 0–1.9)
CALCIUM SERPL-MCNC: 9.1 MG/DL (ref 8.7–10.5)
CHLORIDE SERPL-SCNC: 107 MMOL/L (ref 95–110)
CO2 SERPL-SCNC: 25 MMOL/L (ref 23–29)
CREAT SERPL-MCNC: 1.39 MG/DL (ref 0.5–1.4)
DIFFERENTIAL METHOD: ABNORMAL
EOSINOPHIL # BLD AUTO: 0.2 K/UL (ref 0–0.5)
EOSINOPHIL NFR BLD: 3.2 % (ref 0–8)
ERYTHROCYTE [DISTWIDTH] IN BLOOD BY AUTOMATED COUNT: 14.5 % (ref 11.5–14.5)
EST. GFR  (NO RACE VARIABLE): 38.4 ML/MIN/1.73 M^2
GLUCOSE SERPL-MCNC: 121 MG/DL (ref 70–110)
HCT VFR BLD AUTO: 41.6 % (ref 37–48.5)
HGB BLD-MCNC: 13.7 G/DL (ref 12–16)
IMM GRANULOCYTES # BLD AUTO: 0.03 K/UL (ref 0–0.04)
IMM GRANULOCYTES NFR BLD AUTO: 0.5 % (ref 0–0.5)
LYMPHOCYTES # BLD AUTO: 1.8 K/UL (ref 1–4.8)
LYMPHOCYTES NFR BLD: 28.8 % (ref 18–48)
MCH RBC QN AUTO: 31.3 PG (ref 27–31)
MCHC RBC AUTO-ENTMCNC: 32.9 G/DL (ref 32–36)
MCV RBC AUTO: 95 FL (ref 82–98)
MONOCYTES # BLD AUTO: 0.6 K/UL (ref 0.3–1)
MONOCYTES NFR BLD: 9.1 % (ref 4–15)
NEUTROPHILS # BLD AUTO: 3.6 K/UL (ref 1.8–7.7)
NEUTROPHILS NFR BLD: 57.4 % (ref 38–73)
NRBC BLD-RTO: 0 /100 WBC
PLATELET # BLD AUTO: 275 K/UL (ref 150–450)
PMV BLD AUTO: 8.2 FL (ref 9.2–12.9)
POTASSIUM SERPL-SCNC: 4 MMOL/L (ref 3.5–5.1)
RBC # BLD AUTO: 4.38 M/UL (ref 4–5.4)
SODIUM SERPL-SCNC: 143 MMOL/L (ref 136–145)
UUN UR-MCNC: 15 MG/DL (ref 7–17)
WBC # BLD AUTO: 6.18 K/UL (ref 3.9–12.7)

## 2022-09-13 PROCEDURE — 80048 BASIC METABOLIC PNL TOTAL CA: CPT | Mod: PO | Performed by: INTERNAL MEDICINE

## 2022-09-13 PROCEDURE — 85025 COMPLETE CBC W/AUTO DIFF WBC: CPT | Mod: PO | Performed by: INTERNAL MEDICINE

## 2022-09-13 PROCEDURE — 36415 COLL VENOUS BLD VENIPUNCTURE: CPT | Mod: PO | Performed by: INTERNAL MEDICINE

## 2022-09-14 ENCOUNTER — ANESTHESIA EVENT (OUTPATIENT)
Dept: CARDIOLOGY | Facility: HOSPITAL | Age: 82
End: 2022-09-14
Payer: MEDICARE

## 2022-09-15 ENCOUNTER — ANESTHESIA (OUTPATIENT)
Dept: CARDIOLOGY | Facility: HOSPITAL | Age: 82
End: 2022-09-15
Payer: MEDICARE

## 2022-09-15 ENCOUNTER — HOSPITAL ENCOUNTER (OUTPATIENT)
Facility: HOSPITAL | Age: 82
Discharge: HOME | End: 2022-09-15
Attending: INTERNAL MEDICINE | Admitting: INTERNAL MEDICINE
Payer: MEDICARE

## 2022-09-15 VITALS
TEMPERATURE: 98 F | OXYGEN SATURATION: 96 % | RESPIRATION RATE: 22 BRPM | HEIGHT: 67 IN | WEIGHT: 170 LBS | DIASTOLIC BLOOD PRESSURE: 60 MMHG | HEART RATE: 66 BPM | SYSTOLIC BLOOD PRESSURE: 125 MMHG | BODY MASS INDEX: 26.68 KG/M2

## 2022-09-15 DIAGNOSIS — I70.229 CRITICAL LOWER LIMB ISCHEMIA: ICD-10-CM

## 2022-09-15 DIAGNOSIS — Z01.810 PRE-OPERATIVE CARDIOVASCULAR EXAMINATION: Primary | ICD-10-CM

## 2022-09-15 LAB
ANION GAP SERPL CALC-SCNC: 11 MMOL/L (ref 8–16)
BUN SERPL-MCNC: 12 MG/DL (ref 8–23)
CALCIUM SERPL-MCNC: 8.4 MG/DL (ref 8.7–10.5)
CHLORIDE SERPL-SCNC: 112 MMOL/L (ref 95–110)
CO2 SERPL-SCNC: 20 MMOL/L (ref 23–29)
CREAT SERPL-MCNC: 1.1 MG/DL (ref 0.5–1.4)
EST. GFR  (NO RACE VARIABLE): 51 ML/MIN/1.73 M^2
GLUCOSE SERPL-MCNC: 100 MG/DL (ref 70–110)
POC ACTIVATED CLOTTING TIME K: 207 SEC (ref 74–137)
POC ACTIVATED CLOTTING TIME K: 237 SEC (ref 74–137)
POC ACTIVATED CLOTTING TIME K: 242 SEC (ref 74–137)
POC ACTIVATED CLOTTING TIME K: 248 SEC (ref 74–137)
POC ACTIVATED CLOTTING TIME K: 260 SEC (ref 74–137)
POC ACTIVATED CLOTTING TIME K: 260 SEC (ref 74–137)
POTASSIUM SERPL-SCNC: 3.7 MMOL/L (ref 3.5–5.1)
SAMPLE: ABNORMAL
SODIUM SERPL-SCNC: 143 MMOL/L (ref 136–145)

## 2022-09-15 PROCEDURE — 85347 COAGULATION TIME ACTIVATED: CPT | Performed by: INTERNAL MEDICINE

## 2022-09-15 PROCEDURE — 37000009 HC ANESTHESIA EA ADD 15 MINS: Performed by: INTERNAL MEDICINE

## 2022-09-15 PROCEDURE — 63600175 PHARM REV CODE 636 W HCPCS: Performed by: INTERNAL MEDICINE

## 2022-09-15 PROCEDURE — 37220 PR REVASCULARIZE ILIAC ARTERY,ANGIOPLASTY, INITIAL VESSEL: ICD-10-PCS | Mod: 51,59,LT, | Performed by: INTERNAL MEDICINE

## 2022-09-15 PROCEDURE — C9765 REVASC INTRA LITHOTRIP-STENT: HCPCS | Mod: RT | Performed by: INTERNAL MEDICINE

## 2022-09-15 PROCEDURE — 37224 PR FEM/POPL REVAS W/TLA: CPT | Mod: 51,RT,, | Performed by: INTERNAL MEDICINE

## 2022-09-15 PROCEDURE — C1887 CATHETER, GUIDING: HCPCS | Performed by: INTERNAL MEDICINE

## 2022-09-15 PROCEDURE — 93005 ELECTROCARDIOGRAM TRACING: CPT

## 2022-09-15 PROCEDURE — 37224 PR FEM/POPL REVAS W/TLA: ICD-10-PCS | Mod: 51,RT,, | Performed by: INTERNAL MEDICINE

## 2022-09-15 PROCEDURE — 93010 ELECTROCARDIOGRAM REPORT: CPT | Mod: 59,,, | Performed by: INTERNAL MEDICINE

## 2022-09-15 PROCEDURE — 37221 PR REVASCULARIZE ILIAC ARTERY,ANGIOPLASTY/STENT, INITIAL VESSEL: ICD-10-PCS | Mod: RT,,, | Performed by: INTERNAL MEDICINE

## 2022-09-15 PROCEDURE — 01924 ANES THER IVNTL RAD ARTL NOS: CPT | Performed by: INTERNAL MEDICINE

## 2022-09-15 PROCEDURE — 27201423 OPTIME MED/SURG SUP & DEVICES STERILE SUPPLY: Performed by: INTERNAL MEDICINE

## 2022-09-15 PROCEDURE — C1894 INTRO/SHEATH, NON-LASER: HCPCS | Performed by: INTERNAL MEDICINE

## 2022-09-15 PROCEDURE — 63600175 PHARM REV CODE 636 W HCPCS: Performed by: NURSE ANESTHETIST, CERTIFIED REGISTERED

## 2022-09-15 PROCEDURE — C2623 CATH, TRANSLUMIN, DRUG-COAT: HCPCS | Performed by: INTERNAL MEDICINE

## 2022-09-15 PROCEDURE — 80048 BASIC METABOLIC PNL TOTAL CA: CPT | Performed by: STUDENT IN AN ORGANIZED HEALTH CARE EDUCATION/TRAINING PROGRAM

## 2022-09-15 PROCEDURE — C9764 REVASC INTRAVASC LITHOTRIPSY: HCPCS | Mod: 59,LT | Performed by: INTERNAL MEDICINE

## 2022-09-15 PROCEDURE — C1725 CATH, TRANSLUMIN NON-LASER: HCPCS | Performed by: INTERNAL MEDICINE

## 2022-09-15 PROCEDURE — 25000003 PHARM REV CODE 250: Performed by: INTERNAL MEDICINE

## 2022-09-15 PROCEDURE — C1753 CATH, INTRAVAS ULTRASOUND: HCPCS | Performed by: INTERNAL MEDICINE

## 2022-09-15 PROCEDURE — 93010 EKG 12-LEAD: ICD-10-PCS | Mod: 59,,, | Performed by: INTERNAL MEDICINE

## 2022-09-15 PROCEDURE — 25000003 PHARM REV CODE 250: Performed by: NURSE ANESTHETIST, CERTIFIED REGISTERED

## 2022-09-15 PROCEDURE — 37000008 HC ANESTHESIA 1ST 15 MINUTES: Performed by: INTERNAL MEDICINE

## 2022-09-15 PROCEDURE — 37220 PR REVASCULARIZE ILIAC ARTERY,ANGIOPLASTY, INITIAL VESSEL: CPT | Mod: 51,59,LT, | Performed by: INTERNAL MEDICINE

## 2022-09-15 PROCEDURE — 37221 PR REVASCULARIZE ILIAC ARTERY,ANGIOPLASTY/STENT, INITIAL VESSEL: CPT | Mod: RT,,, | Performed by: INTERNAL MEDICINE

## 2022-09-15 PROCEDURE — C1769 GUIDE WIRE: HCPCS | Performed by: INTERNAL MEDICINE

## 2022-09-15 PROCEDURE — 36415 COLL VENOUS BLD VENIPUNCTURE: CPT | Performed by: STUDENT IN AN ORGANIZED HEALTH CARE EDUCATION/TRAINING PROGRAM

## 2022-09-15 DEVICE — IMPLANTABLE DEVICE: Type: IMPLANTABLE DEVICE | Site: GROIN | Status: FUNCTIONAL

## 2022-09-15 RX ORDER — DIPHENHYDRAMINE HCL 25 MG
50 CAPSULE ORAL ONCE
Status: DISCONTINUED | OUTPATIENT
Start: 2022-09-15 | End: 2022-09-19 | Stop reason: HOSPADM

## 2022-09-15 RX ORDER — ETOMIDATE 2 MG/ML
INJECTION INTRAVENOUS
Status: DISCONTINUED | OUTPATIENT
Start: 2022-09-15 | End: 2022-09-15

## 2022-09-15 RX ORDER — HEPARIN SODIUM 1000 [USP'U]/ML
INJECTION, SOLUTION INTRAVENOUS; SUBCUTANEOUS
Status: DISCONTINUED | OUTPATIENT
Start: 2022-09-15 | End: 2022-09-19 | Stop reason: HOSPADM

## 2022-09-15 RX ORDER — PROPOFOL 10 MG/ML
VIAL (ML) INTRAVENOUS
Status: DISCONTINUED | OUTPATIENT
Start: 2022-09-15 | End: 2022-09-15

## 2022-09-15 RX ORDER — LIDOCAINE HYDROCHLORIDE 10 MG/ML
INJECTION, SOLUTION EPIDURAL; INFILTRATION; INTRACAUDAL; PERINEURAL
Status: DISCONTINUED | OUTPATIENT
Start: 2022-09-15 | End: 2022-09-19 | Stop reason: HOSPADM

## 2022-09-15 RX ORDER — SODIUM CHLORIDE 9 MG/ML
INJECTION, SOLUTION INTRAVENOUS CONTINUOUS
Status: ACTIVE | OUTPATIENT
Start: 2022-09-15 | End: 2022-09-15

## 2022-09-15 RX ORDER — ONDANSETRON 4 MG/1
8 TABLET, ORALLY DISINTEGRATING ORAL EVERY 8 HOURS PRN
Status: DISCONTINUED | OUTPATIENT
Start: 2022-09-15 | End: 2022-09-19 | Stop reason: HOSPADM

## 2022-09-15 RX ORDER — ASPIRIN 81 MG/1
81 TABLET ORAL DAILY
Qty: 90 TABLET | Refills: 3 | Status: SHIPPED | OUTPATIENT
Start: 2022-09-15 | End: 2023-07-19

## 2022-09-15 RX ORDER — FENTANYL CITRATE 50 UG/ML
INJECTION, SOLUTION INTRAMUSCULAR; INTRAVENOUS
Status: DISCONTINUED | OUTPATIENT
Start: 2022-09-15 | End: 2022-09-15

## 2022-09-15 RX ORDER — DEXAMETHASONE SODIUM PHOSPHATE 4 MG/ML
INJECTION, SOLUTION INTRA-ARTICULAR; INTRALESIONAL; INTRAMUSCULAR; INTRAVENOUS; SOFT TISSUE
Status: DISCONTINUED | OUTPATIENT
Start: 2022-09-15 | End: 2022-09-15

## 2022-09-15 RX ORDER — SODIUM CHLORIDE 9 MG/ML
INJECTION, SOLUTION INTRAVENOUS CONTINUOUS
Status: DISCONTINUED | OUTPATIENT
Start: 2022-09-15 | End: 2022-09-15 | Stop reason: HOSPADM

## 2022-09-15 RX ORDER — LIDOCAINE HYDROCHLORIDE 20 MG/ML
INJECTION INTRAVENOUS
Status: DISCONTINUED | OUTPATIENT
Start: 2022-09-15 | End: 2022-09-15

## 2022-09-15 RX ORDER — ASPIRIN 325 MG
325 TABLET ORAL ONCE
Status: DISCONTINUED | OUTPATIENT
Start: 2022-09-15 | End: 2022-09-19 | Stop reason: HOSPADM

## 2022-09-15 RX ORDER — ROCURONIUM BROMIDE 10 MG/ML
INJECTION, SOLUTION INTRAVENOUS
Status: DISCONTINUED | OUTPATIENT
Start: 2022-09-15 | End: 2022-09-15

## 2022-09-15 RX ORDER — ACETAMINOPHEN 325 MG/1
650 TABLET ORAL EVERY 4 HOURS PRN
Status: DISCONTINUED | OUTPATIENT
Start: 2022-09-15 | End: 2022-09-19 | Stop reason: HOSPADM

## 2022-09-15 RX ORDER — NAPROXEN SODIUM 220 MG/1
325 TABLET, FILM COATED ORAL ONCE
Status: DISCONTINUED | OUTPATIENT
Start: 2022-09-15 | End: 2022-09-15

## 2022-09-15 RX ORDER — ONDANSETRON 2 MG/ML
INJECTION INTRAMUSCULAR; INTRAVENOUS
Status: DISCONTINUED | OUTPATIENT
Start: 2022-09-15 | End: 2022-09-15

## 2022-09-15 RX ORDER — HEPARIN SODIUM 200 [USP'U]/100ML
INJECTION, SOLUTION INTRAVENOUS
Status: DISCONTINUED | OUTPATIENT
Start: 2022-09-15 | End: 2022-09-19 | Stop reason: HOSPADM

## 2022-09-15 RX ORDER — EPHEDRINE SULFATE 50 MG/ML
INJECTION, SOLUTION INTRAVENOUS
Status: DISCONTINUED | OUTPATIENT
Start: 2022-09-15 | End: 2022-09-15

## 2022-09-15 RX ORDER — CLOPIDOGREL BISULFATE 75 MG/1
75 TABLET ORAL DAILY
Status: DISCONTINUED | OUTPATIENT
Start: 2022-09-15 | End: 2022-09-19 | Stop reason: HOSPADM

## 2022-09-15 RX ADMIN — ROCURONIUM BROMIDE 15 MG: 10 INJECTION, SOLUTION INTRAVENOUS at 12:09

## 2022-09-15 RX ADMIN — LIDOCAINE HYDROCHLORIDE 100 MG: 20 INJECTION, SOLUTION INTRAVENOUS at 11:09

## 2022-09-15 RX ADMIN — SUGAMMADEX 300 MG: 100 INJECTION, SOLUTION INTRAVENOUS at 02:09

## 2022-09-15 RX ADMIN — FENTANYL CITRATE 50 MCG: 50 INJECTION, SOLUTION INTRAMUSCULAR; INTRAVENOUS at 02:09

## 2022-09-15 RX ADMIN — ONDANSETRON 4 MG: 2 INJECTION, SOLUTION INTRAMUSCULAR; INTRAVENOUS at 02:09

## 2022-09-15 RX ADMIN — FENTANYL CITRATE 100 MCG: 50 INJECTION, SOLUTION INTRAMUSCULAR; INTRAVENOUS at 11:09

## 2022-09-15 RX ADMIN — EPHEDRINE SULFATE 10 MG: 50 INJECTION INTRAVENOUS at 02:09

## 2022-09-15 RX ADMIN — GLYCOPYRROLATE 0.1 MG: 0.2 INJECTION, SOLUTION INTRAMUSCULAR; INTRAVITREAL at 11:09

## 2022-09-15 RX ADMIN — DEXAMETHASONE SODIUM PHOSPHATE 4 MG: 4 INJECTION, SOLUTION INTRA-ARTICULAR; INTRALESIONAL; INTRAMUSCULAR; INTRAVENOUS; SOFT TISSUE at 11:09

## 2022-09-15 RX ADMIN — PHENYLEPHRINE HYDROCHLORIDE 0.25 MCG/KG/MIN: 10 INJECTION INTRAVENOUS at 11:09

## 2022-09-15 RX ADMIN — PROPOFOL 25 MG: 10 INJECTION, EMULSION INTRAVENOUS at 11:09

## 2022-09-15 RX ADMIN — ROCURONIUM BROMIDE 50 MG: 10 INJECTION, SOLUTION INTRAVENOUS at 11:09

## 2022-09-15 RX ADMIN — FENTANYL CITRATE 50 MCG: 50 INJECTION, SOLUTION INTRAMUSCULAR; INTRAVENOUS at 11:09

## 2022-09-15 RX ADMIN — ONDANSETRON 4 MG: 2 INJECTION, SOLUTION INTRAMUSCULAR; INTRAVENOUS at 11:09

## 2022-09-15 RX ADMIN — HYPROMELLOSE 2910 2 DROP: 5 SOLUTION OPHTHALMIC at 11:09

## 2022-09-15 RX ADMIN — SODIUM CHLORIDE, SODIUM LACTATE, POTASSIUM CHLORIDE, AND CALCIUM CHLORIDE: .6; .31; .03; .02 INJECTION, SOLUTION INTRAVENOUS at 11:09

## 2022-09-15 RX ADMIN — SODIUM CHLORIDE, SODIUM LACTATE, POTASSIUM CHLORIDE, AND CALCIUM CHLORIDE: .6; .31; .03; .02 INJECTION, SOLUTION INTRAVENOUS at 12:09

## 2022-09-15 RX ADMIN — ETOMIDATE 12 MG: 2 INJECTION, SOLUTION INTRAVENOUS at 11:09

## 2022-09-15 RX ADMIN — SODIUM CHLORIDE: 0.9 INJECTION, SOLUTION INTRAVENOUS at 10:09

## 2022-09-15 NOTE — PLAN OF CARE
Patient transferred to recovery cath lab via stretcher with sr up x2.  Pt asleep with oral airway in place.and O2 per simple mask.  VSS. Left groin sheath in place with gauze and tegaderm dressing c.d.i. No drainage or shadowing noted.  No redness, bruising, or hematoma noted around site. Fall risk precautions given and patient acknowledges.  AIDET completed to pt.  Will continue to monitor pt.

## 2022-09-15 NOTE — Clinical Note
Procedural sedation maintain through anesthesia team. Check anesthesia case record for information concerning medications given, vital signs, and patient assessment.

## 2022-09-15 NOTE — ANESTHESIA PROCEDURE NOTES
Intubation    Date/Time: 9/15/2022 11:28 AM  Performed by: Louis Barnett CRNA  Authorized by: Roe Dillard MD     Intubation:     Induction:  Intravenous    Intubated:  Postinduction    Mask Ventilation:  Easy mask    Attempts:  1    Attempted By:  CRNA    Method of Intubation:  Video laryngoscopy    Blade:  [place] 3    Laryngeal View Grade: Grade I - full view of cords      Difficult Airway Encountered?: No      Complications:  None    Airway Device:  Oral endotracheal tube    Airway Device Size:  7.5    Style/Cuff Inflation:  Cuffed (inflated to minimal occlusive pressure)    Inflation Amount (mL):  4    Tube secured:  21    Secured at:  The lips    Placement Verified By:  Capnometry    Complicating Factors:  None    Findings Post-Intubation:  BS equal bilateral and atraumatic/condition of teeth unchanged

## 2022-09-15 NOTE — DISCHARGE INSTRUCTIONS
Discharge Instructions:     Do not drive a car, operate heavy equipment, care for a young child, etc for the next 12-24 hours.   Avoid drinking alcohol for 24 hours.  Do not make any important decisions for 24 hours.     Drink fluids to keep hydrated. Resume your usual diet as tolerated.      Rest for today then activity as tolerated.   Do not lift anything over 5 pounds for the first 3 days after procedure.     Remove dressing tomorrow after 3 pm then may shower with warm soapy water. Do not scrub site. Pat dry.   May apply bandaid for 2 days.  No tub baths.  Do not submerge wound in water for 3 days.      Call MD for any unrelieved pain, excessive nausea or vomiting, redness around site, bleeding, or pus or foul smelling drainage, or any other questions or concerns.     Go to the ER for any difficulty breathing or chest pain.        If site swells or bleeds, hold direct pressure to area for 10 full minutes.   If site continues to bleed, continue to hold pressure to site and have someone bring you to the ER.       Follow any additional instructions given to you by MD.      Call MD to schedule a follow up appointment, or follow up as instructed.         Last Modified by Rukhsana Hernandez RN at 6/1/22 1050    Discharge Instructions:    Do not drive a car, operate heavy equipment, care for a young child, etc for the next 12-24 hours.   Avoid drinking alcohol for 24 hours.  Do not make any important decisions for 24 hours.    Drink fluids to keep hydrated. Resume your usual diet as tolerated.     Rest for today then activity as tolerated.   Do not lift anything over 5 pounds for the first 3 days after procedure.    Remove dressing tomorrow then may shower with warm soapy water. Do not scrub site. Pat dry.   May apply bandaid for 2 days.  No tub baths.  Do not submerge wound in water for 3 days.     Call MD for any unrelieved pain, excessive nausea or vomiting, redness around site, bleeding, or pus or foul smelling  drainage, or any other questions or concerns.    Go to the ER for any difficulty breathing or chest pain.      If site swells or bleeds, hold direct pressure to area for 10 full minutes.   If site continues to bleed, continue to hold pressure to site and have someone bring you to the ER.

## 2022-09-15 NOTE — PROCEDURES
Brief Operative Note:    : Julio Nguyen MD     Referring Physician: Julio Nguyen     All Operators: Surgeon(s):  MD Carol Mahoney MD     Preoperative Diagnosis: Critical lower limb ischemia [I70.229]     Postop Diagnosis: Critical lower limb ischemia [I70.229]    Treatments/Procedures: Procedure(s) (LRB):  Stent, Iliac Artery (Right)  ULTRASOUND, INTRAVASCULAR (N/A)  PTA, Iliac Artery    Access: Left CFA    Findings:Severe peripheral artery disease is present.     High grade ostial L JET stenosis ~90%    High grade R EIA stenosis ~80%    High grade R DFA stenosis ~90%    Mid R SFA  with distal reconstitution    High grade P3 POP stenosis ~90%    3-vessel run off to the right foot     See catheterization report for full details.    Intervention:     PTA to L JET with 4.0x40 mm balloon  IVLT to L JET with 7.0 Shockwave balloon    PTA to R DFA with 3.0x40 mm balloon  IVLT to R DFA with 7.0 Shockwave balloon   PTA of R DFA with 6.0x60 mm Lutonix DCB    IVLT to R EIA with 7.0 Shockwave balloon  Stent to R EIA with 8x60 mm Zilver stent, post-dilated with 7.0 balloon    IVUS utilized for sizing.       See catheterization report for full details.    Closure device: Manual pressure       Plan:  - Post cath protocol   - IVF @  200cc/hr x 4 hours  - Sheath removal once ACT < 200  - Bed rest x 4 hours after sheath removal  - Continue dual antiplatelet therapy  - Continue high intensity statin therapy (LDL goal < 70)  - Risk factor reduction (BP <130/80 mmHg, glycemic control, etc)  - Follow up with outpatient cardiologist    Estimated Blood loss: 20 cc    Specimens removed: No    Carol Gerardo MD

## 2022-09-15 NOTE — ANESTHESIA PREPROCEDURE EVALUATION
09/15/2022  Yanni Car is a 81 y.o., female. Scheduled for Stent, Iliac Artery (Right) under GETA. NPO, took asa/plavix this am. Mets limited 2/2 ambulates with walker, denies chest pain with ambulation. Denies h/o MI, CVA, Sz. Took ATC this am. Smoker, denies cough, sputum, URI.    Past Medical History:   Diagnosis Date    Arthritis     Basal cell carcinoma     Hypertension     PAF (paroxysmal atrial fibrillation) 8/17/2016    In NSR today-on amiodarone [id number] 4 with 4% risk of yearly CVA  Afib was related to SIRS in 1/2012 She was admitted for pneumonia   1/2019:   Normal EF  Normal diastolic parameters  No valvular disease           Past Surgical History:   Procedure Laterality Date    AORTOGRAPHY WITH SERIALOGRAPHY N/A 9/1/2022    Procedure: AORTOGRAM, WITH SERIALOGRAPHY;  Surgeon: Julio Nguyen MD;  Location: [facility] CATH LAB[facility]EP;  Service: Cardiology;  Laterality: N/A;    HYSTERECTOMY      mohs repair       Review of patient's allergies indicates:   Allergen Reactions    Penicillins Swelling             Pre-op Assessment    I have reviewed the Patient Summary Reports.     I have reviewed the Nursing Notes. I have reviewed the NPO Status.      Review of Systems  Anesthesia Hx:  No problems with previous Anesthesia   Denies Personal Hx of Anesthesia complications.   Social:  Smoker    Cardiovascular:   Hypertension Dysrhythmias atrial fibrillation hyperlipidemia    Renal/:   Chronic Renal Disease    Neurological:  Neurology Normal    Endocrine:  Endocrine Normal        Physical Exam  General: Well nourished    Airway:  Mallampati: II   Mouth Opening: Normal        Anesthesia Plan  Type of Anesthesia, risks & benefits discussed:    Anesthesia Type: MAC, Gen ETT  Informed Consent: Informed consent signed with the Patient and all parties understand the risks and agree with anesthesia plan.   All questions answered.   ASA Score: 3    Ready For Surgery From Anesthesia Perspective.     .

## 2022-09-15 NOTE — Clinical Note
The site was marked. The groin was prepped. The site was prepped with ChloraPrep. The site was clipped. The patient was draped. The patient was positioned supine.

## 2022-09-15 NOTE — H&P
[facility])  Interventional Cardiology  H&P    Patient Name: Yanni Car  MRN: [id number]  Admission Date: 9/15/2022  Code Status: Prior   Attending Provider: Julio Nguyen MD   Primary Care Physician: Rajni No MD  Principal Problem:<principal problem not specified>    Patient information was obtained from patient and past medical records.     Subjective:     HPI:  Yanni Car 81 y.o. female is here accompanied by her  by recommendation of her care team for right great toe gangrene.  Wound started nearly 6 weeks ago.  She has no recollection of any trauma. She has 7/10 pain with intermittent jolting sensation. [place]af[place] IV; [place] 5.          She has a past history of dafne IIb R calf claudication. She was previously under the care of Dr. Valerio. She had PAF several years ago during an hospitalization with SIRS. She was on on coumadin and amiodarone for 2 years. She in NSR She has PAD s/p failed SVG R fem-pop in 8/2013 with bilateral SFA CTOs. She smokes and is very interested in quitting. She takes aspirin 81 mg daily. No CVA/TIA.     She underwent diagnostic peripheral angiogram on 9/1/22 which demonstrated high grade R EIA, R prox SFA, R prox DFA, and P3 POP stenosis in addition to mid SFA  with distal reconsitution. She has 3-vessel run-off below the knee to the right foot. She is here today for staged intervention of RLE.      See Rt foot / right great toe gangrene images below, 9/15/22.      ECG 8/2018: sinus bradycardia         Afib was related to SIRS in 1/2012 while she was admitted for pneumonia     Carotid US 9/2016              Patent bilateral ICA              Patent L vertebral              Retrograde flow R vertebral concerning for steal syndrome     Event monitor 8/2016              No afib        Echo 2016:              Normal EF        S/p failed R reverse vein fem-pop done 8/2013              Failure within 2 to 4 weeks                      JUDITH 10/2013                  R 0.76              L 0.79        US 2013     R , pSFA 112, mSFA 259, dSFA 32, POP 48, PT 38, AT 27, DP 23  Failed fem-pop + R SFA         US 4/2019              Failed R fem-pop              Bilateral SFA CTOs                 JUDITH 1/2019                 R NC with TBI 0.41              L 1.28 with TBI 0.41              Monophasic waveforms bilaterally         JUDITH 2/2020                 NC vessels bilaterally            LE US 7/2022                 High grade L JET stenosis               High R EIA stenosis              Bilateral SFA               Patent BTK vessels                    UE US 7/2022                 No significant disease               Patient Active Problem List     Diagnosis Date Noted    Critical lower limb ischemia 08/31/2022    Decreased strength of lower extremity 11/03/2020    Decreased range of motion of both hips 11/03/2020    Gait abnormality 11/03/2020    Acute fracture 06/24/2020    Closed fracture of left inferior pubic ramus      Closed nondisplaced fracture of anterior wall of left acetabulum      Wheezing      Essential hypertension      Fx sacrum/coccyx-closed 06/23/2020    Fracture of third lumbar vertebra 06/23/2020    Glaucoma, right eye 06/23/2020    CKD (chronic kidney disease) stage 3, GFR 30-59 ml/min 06/23/2020    Common bile duct dilatation 06/23/2020    Long term current use of anticoagulant therapy 08/18/2016    PAD (peripheral artery disease) 08/17/2016          S/p failed R reverse vein fem-pop done 8/2013              Failure within 2 to 4 weeks                      JUDITH 10/2013                 R 0.76              L 0.79        US 2013        R , pSFA 112, mSFA 259, dSFA 32, POP 48, PT 38, AT 27, DP 23  Failed fem-pop              JUDITH 1/2019                 R NC with TBI 0.41              L 1.28 with TBI 0.41              Monophasic waveforms bilaterally        Tobacco use 08/17/2016    Hyperlipidemia 08/17/2016    Paroxysmal A-fib 08/17/2016        In NSR today-on amiodarone  [id number] 4 with 4% risk of yearly CVA     Afib was related to SIRS in 2012  She was admitted for pneumonia        2019:               Normal EF              Normal diastolic parameters              No valvular disease                         Bilateral carotid bruits 2016                               R ICA  cm/sec              L ICA PSV 93 cm/sec              Retrograde R vertebral              Antegrade L vertebral                 BCC (basal cell carcinoma), scalp/neck 2012    Post-operative state 2012    Mohs defect of eyelid 2012    Basal cell carcinoma of eyelid 09/10/2012    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   HENT:  Negative for congestion.    Eyes:  Negative for blurred vision, discharge and double vision.   Cardiovascular:  Positive for leg swelling. Negative for chest pain, claudication (R >> L ), cyanosis, dyspnea on exertion, irregular heartbeat, near-syncope, orthopnea, palpitations, paroxysmal nocturnal dyspnea and syncope.   Respiratory:  Negative for cough, shortness of breath and wheezing.    Endocrine: Negative for cold intolerance, heat intolerance and polyphagia.   Hematologic/Lymphatic: Negative for adenopathy and bleeding problem. Does not bruise/bleed easily.   Skin:  Negative for dry skin and nail changes.   Musculoskeletal:  Negative for arthritis, back pain, falls, joint pain, myalgias and neck pain.   Gastrointestinal:  Negative for bloating, abdominal pain, change in bowel habit and constipation.   Genitourinary:  Negative for bladder incontinence, dysuria, flank pain, genital sores and missed menses.   Neurological:  Negative for aphonia, brief paralysis, difficulty with concentration,  dizziness and weakness.   Psychiatric/Behavioral:  Negative for altered mental status and memory loss. The patient does not have insomnia.    Allergic/Immunologic: Negative for environmental allergies.      Objective:   Physical Exam  Constitutional:       Appearance: She is well-developed.      Interventions: She is not intubated.  HENT:      Head: Normocephalic and atraumatic.      Right Ear: External ear normal.      Left Ear: External ear normal.   Eyes:      General: No scleral icterus.        Right eye: No discharge.         Left eye: No discharge.      Conjunctiva/sclera: Conjunctivae normal.      Pupils: Pupils are equal, round, and reactive to light.   Neck:      Thyroid: No thyromegaly.      Vascular: Normal carotid pulses. No carotid bruit, hepatojugular reflux or JVD.      Trachea: No tracheal deviation.   Cardiovascular:      Rate and Rhythm: Normal rate and regular rhythm. No extrasystoles are present.     Chest Wall: PMI is not displaced.      Pulses:           Carotid pulses are 2+ on the right side with bruit and 2+ on the left side with bruit.       Radial pulses are 2+ on the right side and 2+ on the left side.        Femoral pulses are 2+ on the right side and 2+ on the left side.       Popliteal pulses are 2+ on the right side and 2+ on the left side.        Dorsalis pedis pulses are 0 on the right side and 0 on the left side.        Posterior tibial pulses are 0 on the right side and 0 on the left side.      Heart sounds: S1 normal and S2 normal. Heart sounds not distant. No midsystolic click. No murmur heard.    No friction rub. No gallop. No S3 sounds.      Comments: Monophasic R PT and biphasic R DP doppler signals      Biphasic L PT and DP doppler signals         Pulmonary:      Effort: Pulmonary effort is normal. No tachypnea, bradypnea, accessory muscle usage or respiratory distress. She is not intubated.      Breath sounds: Normal breath sounds. No stridor. No decreased breath sounds,  wheezing or rales.   Chest:      Chest wall: No tenderness.   Abdominal:      General: There is no distension or abdominal bruit.      Palpations: There is no mass or pulsatile mass.      Tenderness: There is no abdominal tenderness. There is no guarding or rebound.   Musculoskeletal:         General: No tenderness. Normal range of motion.      Cervical back: Normal range of motion and neck supple.   Lymphadenopathy:      Cervical: No cervical adenopathy.      Comments: Trace edema of bilateral ankles   Skin:     General: Skin is warm.      Coloration: Skin is not pale.      Findings: No erythema or rash.      Comments:      R great toe gangrene      See images                        Neurological:      Mental Status: She is alert and oriented to person, place, and time.      Cranial Nerves: No cranial nerve deficit.      Coordination: Coordination normal.      Deep Tendon Reflexes: Reflexes are normal and symmetric.   Psychiatric:         Behavior: Behavior normal.         Thought Content: Thought content normal.         Judgment: Judgment normal.         9/15/2022                     Assessment:      1. Critical lower limb ischemia    2. Bilateral carotid bruits    3. Essential hypertension    4. Mixed hyperlipidemia    5. PAD (peripheral artery disease)    6. Tobacco use    7. Stage 3 chronic kidney disease, unspecified whether stage 3a or 3b CKD             Plan:      Proceed with peripheral angiography and planned intervention of RLE (will treat L JET with IVLT to help facilitate delivery of equipment).     Access: L CFA    Antiplatelets: ASA + Plavix      The risks (including death, heart attack, limb loss, paralysis, emergency surgery, bleeding, renal failure, and stroke), the potential benefits of the procedure, and the alternatives to the procedure, were discussed in detail and accepted by the patient. All questions were answered. Patient agrees to proceed.             I have reviewed the patient's medical  history in detail and updated the computerized patient record.           Orders Placed This Encounter   Procedures    X-Ray Foot Complete Right       Standing Status:   Future       Standing Expiration Date:   8/31/2023       Order Specific Question:   May the Radiologist modify the order per protocol to meet the clinical needs of the patient?       Answer:   Yes       Order Specific Question:   Release to patient       Answer:   Immediate    Sedimentation rate       Standing Status:   Future       Standing Expiration Date:   10/30/2023    C-REACTIVE PROTEIN       Standing Status:   Future       Standing Expiration Date:   10/30/2023    CBC Auto Differential       Standing Status:   Future       Standing Expiration Date:   10/30/2023    Comprehensive Metabolic Panel       Standing Status:   Future       Standing Expiration Date:   10/30/2023    Lipid Panel       Standing Status:   Future       Standing Expiration Date:   10/30/2023    Ambulatory referral/consult to Wound Clinic       Standing Status:   Future       Standing Expiration Date:   9/30/2023       Referral Priority:   Routine       Referral Type:   Consultation       Referral Reason:   Specialty Services Required       Requested Specialty:   Wound Care       Number of Visits Requested:   1    Ambulatory referral/consult to Smoking Cessation Program       Standing Status:   Future       Standing Expiration Date:   9/30/2023       Referral Priority:   Routine       Referral Type:   Consultation       Referral Reason:   Specialty Services Required       Requested Specialty:   CTTS       Number of Visits Requested:   1    Ankle Brachial Indices (JUDITH)       Standing Status:   Future       Standing Expiration Date:   8/31/2023       Order Specific Question:   Exam Type:       Answer:   Resting with toe tracings       Order Specific Question:   Release to patient       Answer:   Immediate    Case Request-Cath Lab: AORTOGRAM, WITH SERIALOGRAPHY       Standing  Status:   Standing       Number of Occurrences:   1       Order Specific Question:   CPT Code:       Answer:   DE  ANGIO AORTOBIFEMORAL W CATH [89181]       Order Specific Question:   CPT Code:       Answer:   DE REVASCULARIZE ILIAC ARTERY,ANGIOPLASTY/STENT, INITIAL VESSEL [95893]       Order Specific Question:   CPT Code:       Answer:   DE CORONARY IVUS ONLY [[id number]]       Order Specific Question:   Medical Necessity:       Answer:   Medically Urgent [101]       Order Specific Question:   Is an on-site pathologist required for this procedure?       Answer:   N/A            Follow up as scheduled. Return sooner for concerns or questions                 She expressed verbal understanding and agreed with the plan           Greater than 50% of the visit of 45 minutes was spent counseling, educating, and coordinating the care of the patient.        -In today's visit, at least 4 established conditions that pose a risk to life or bodily function have been addressed and the conditions are severe.     -In today's visit, monitoring for drug toxicity was accomplished.                      Patient's Medications   New Prescriptions     No medications on file   Previous Medications     AMIODARONE (PACERONE) 200 MG TAB    TAKE 1 TABLET BY MOUTH EVERY DAY     AMLODIPINE (NORVASC) 10 MG TABLET    TAKE 1 TABLET BY MOUTH ONCE A DAY     ASPIRIN (ECOTRIN) 81 MG EC TABLET    Take 81 mg by mouth once daily.       CALCIUM-VITAMIN D3 (OS-NOELLE 500 + D3) 500 MG-5 MCG (200 UNIT) PER TABLET    Take 1 tablet by mouth 2 (two) times daily with meals.     CILOSTAZOL (PLETAL) 100 MG TAB    TAKE 1 TABLET BY MOUTH TWO TIMES A DAY     FISH OIL-OMEGA-3 FATTY ACIDS 300-1,000 MG CAPSULE    Take 2 g by mouth once daily.       FUROSEMIDE (LASIX) 20 MG TABLET    TAKE 1 TABLET BY MOUTH EVERY DAY     METOPROLOL TARTRATE (LOPRESSOR) 50 MG TABLET    TAKE 1 TABLET BY MOUTH TWO TIMES A DAY     ROSUVASTATIN (CRESTOR) 10 MG TABLET    TAKE 1 TABLET BY MOUTH EVERY  EVENING   Modified Medications     No medications on file   Discontinued Medications     No medications on file               Carol Gerardo MD  Interventional Cardiology   [facility])

## 2022-09-15 NOTE — HPI
Yanni Car 81 y.o. female is here accompanied by her  by recommendation of her care team for right great toe gangrene.  Wound started nearly 6 weeks ago.  She has no recollection of any trauma. She has 7/10 pain with intermittent jolting sensation. Dafne IV; [place] 5.            She has a past history of dafne IIb R calf claudication. She was previously under the care of Dr. Valerio. She had PAF several years ago during an hospitalization with SIRS. She was on on coumadin and amiodarone for 2 years. She in NSR She has PAD s/p failed SVG R fem-pop in 8/2013 with bilateral SFA CTOs. She smokes and is very interested in quitting. She takes aspirin 81 mg daily. No CVA/TIA.     She underwent diagnostic peripheral angiogram on 9/1/22 which demonstrated high grade R EIA, R prox SFA, R prox DFA, and P3 POP stenosis in addition to mid SFA  with distal reconsitution. She has 3-vessel run-off below the knee to the right foot. She is here today for staged intervention of RLE.         ECG 8/2018: sinus bradycardia         Afib was related to SIRS in 1/2012 while she was admitted for pneumonia     Carotid US 9/2016              Patent bilateral ICA              Patent L vertebral              Retrograde flow R vertebral concerning for steal syndrome     Event monitor 8/2016              No afib        Echo 2016:              Normal EF        S/p failed R reverse vein fem-pop done 8/2013              Failure within 2 to 4 weeks                      JUDITH 10/2013                 R 0.76              L 0.79        US 2013     R , pSFA 112, mSFA 259, dSFA 32, POP 48, PT 38, AT 27, DP 23  Failed fem-pop + R SFA         US 4/2019              Failed R fem-pop              Bilateral SFA CTOs                 JUDITH 1/2019                 R NC with TBI 0.41              L 1.28 with TBI 0.41              Monophasic waveforms bilaterally         JUDITH 2/2020                 NC vessels bilaterally            LE US  7/2022                 High grade L JET stenosis               High R EIA stenosis              Bilateral SFA               Patent BTK vessels                    UE US 7/2022                 No significant disease               Patient Active Problem List     Diagnosis Date Noted    Critical lower limb ischemia 08/31/2022    Decreased strength of lower extremity 11/03/2020    Decreased range of motion of both hips 11/03/2020    Gait abnormality 11/03/2020    Acute fracture 06/24/2020    Closed fracture of left inferior pubic ramus      Closed nondisplaced fracture of anterior wall of left acetabulum      Wheezing      Essential hypertension      Fx sacrum/coccyx-closed 06/23/2020    Fracture of third lumbar vertebra 06/23/2020    Glaucoma, right eye 06/23/2020    CKD (chronic kidney disease) stage 3, GFR 30-59 ml/min 06/23/2020    Common bile duct dilatation 06/23/2020    Long term current use of anticoagulant therapy 08/18/2016    PAD (peripheral artery disease) 08/17/2016          S/p failed R reverse vein fem-pop done 8/2013              Failure within 2 to 4 weeks                      JUDITH 10/2013                 R 0.76              L 0.79        US 2013        R , pSFA 112, mSFA 259, dSFA 32, POP 48, PT 38, AT 27, DP 23  Failed fem-pop              JUDITH 1/2019                 R NC with TBI 0.41              L 1.28 with TBI 0.41              Monophasic waveforms bilaterally        Tobacco use 08/17/2016    Hyperlipidemia 08/17/2016    Paroxysmal A-fib 08/17/2016       In NSR today-on amiodarone  [id number] 4 with 4% risk of yearly CVA     Afib was related to SIRS in 1/2012  She was admitted for pneumonia        1/2019:               Normal EF              Normal diastolic parameters              No valvular disease                         Bilateral carotid bruits 08/17/2016             US 1/20919                 R ICA  cm/sec              L ICA PSV 93 cm/sec              Retrograde R vertebral               Antegrade L vertebral                 BCC (basal cell carcinoma), scalp/neck 2012    Post-operative state 2012    Mohs defect of eyelid 2012    Basal cell carcinoma of eyelid 09/10/2012    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   HENT:  Negative for congestion.    Eyes:  Negative for blurred vision, discharge and double vision.   Cardiovascular:  Positive for leg swelling. Negative for chest pain, claudication (R >> L ), cyanosis, dyspnea on exertion, irregular heartbeat, near-syncope, orthopnea, palpitations, paroxysmal nocturnal dyspnea and syncope.   Respiratory:  Negative for cough, shortness of breath and wheezing.    Endocrine: Negative for cold intolerance, heat intolerance and polyphagia.   Hematologic/Lymphatic: Negative for adenopathy and bleeding problem. Does not bruise/bleed easily.   Skin:  Negative for dry skin and nail changes.   Musculoskeletal:  Negative for arthritis, back pain, falls, joint pain, myalgias and neck pain.   Gastrointestinal:  Negative for bloating, abdominal pain, change in bowel habit and constipation.   Genitourinary:  Negative for bladder incontinence, dysuria, flank pain, genital sores and missed menses.   Neurological:  Negative for aphonia, brief paralysis, difficulty with concentration, dizziness and weakness.   Psychiatric/Behavioral:  Negative for altered mental status and memory loss. The patient does not have insomnia.    Allergic/Immunologic: Negative for environmental allergies.      Objective:   Physical Exam  Constitutional:       Appearance: She is well-developed.      Interventions: She is not intubated.  HENT:      Head: Normocephalic and atraumatic.      Right Ear: External ear normal.      Left Ear: External ear  normal.   Eyes:      General: No scleral icterus.        Right eye: No discharge.         Left eye: No discharge.      Conjunctiva/sclera: Conjunctivae normal.      Pupils: Pupils are equal, round, and reactive to light.   Neck:      Thyroid: No thyromegaly.      Vascular: Normal carotid pulses. No carotid bruit, hepatojugular reflux or JVD.      Trachea: No tracheal deviation.   Cardiovascular:      Rate and Rhythm: Normal rate and regular rhythm. No extrasystoles are present.     Chest Wall: PMI is not displaced.      Pulses:           Carotid pulses are 2+ on the right side with bruit and 2+ on the left side with bruit.       Radial pulses are 2+ on the right side and 2+ on the left side.        Femoral pulses are 2+ on the right side and 2+ on the left side.       Popliteal pulses are 2+ on the right side and 2+ on the left side.        Dorsalis pedis pulses are 0 on the right side and 0 on the left side.        Posterior tibial pulses are 0 on the right side and 0 on the left side.      Heart sounds: S1 normal and S2 normal. Heart sounds not distant. No midsystolic click. No murmur heard.    No friction rub. No gallop. No S3 sounds.      Comments: Monophasic R PT and biphasic R DP doppler signals      Biphasic L PT and DP doppler signals         Pulmonary:      Effort: Pulmonary effort is normal. No tachypnea, bradypnea, accessory muscle usage or respiratory distress. She is not intubated.      Breath sounds: Normal breath sounds. No stridor. No decreased breath sounds, wheezing or rales.   Chest:      Chest wall: No tenderness.   Abdominal:      General: There is no distension or abdominal bruit.      Palpations: There is no mass or pulsatile mass.      Tenderness: There is no abdominal tenderness. There is no guarding or rebound.   Musculoskeletal:         General: No tenderness. Normal range of motion.      Cervical back: Normal range of motion and neck supple.   Lymphadenopathy:      Cervical: No cervical  adenopathy.      Comments: Trace edema of bilateral ankles   Skin:     General: Skin is warm.      Coloration: Skin is not pale.      Findings: No erythema or rash.      Comments:      R great toe gangrene      See images                        Neurological:      Mental Status: She is alert and oriented to person, place, and time.      Cranial Nerves: No cranial nerve deficit.      Coordination: Coordination normal.      Deep Tendon Reflexes: Reflexes are normal and symmetric.   Psychiatric:         Behavior: Behavior normal.         Thought Content: Thought content normal.         Judgment: Judgment normal.         8/2022                       Assessment:      1. Critical lower limb ischemia    2. Bilateral carotid bruits    3. Essential hypertension    4. Mixed hyperlipidemia    5. PAD (peripheral artery disease)    6. Tobacco use    7. Stage 3 chronic kidney disease, unspecified whether stage 3a or 3b CKD             Plan:            Obtain JUDITH with TBI, complete right foot x-ray, ESR, CRP, lipid profile, CMP, CBC, and hemoglobin A1c.          She will have her angiogram with possible revascularization.  Diagnostic angiogram will be done via left radial access then determine revascularization strategy.         We had a long discussion regarding the significance of peripheral arterial disease and critical limb ischemia and their association with limb loss and high mortality.         Referral to wound care.  Guideline directed medical therapy for peripheral arterial disease.           Low salt diet  Exercise  Smoking cessation enrollment  Intense statin therapy  Target BP < 130/80 mmHg        Yearly carotid ultrasound for bruit  Pletal 100 mg bid for claudication              Smoking cessation was discussed  She was not interested in quitting smoking in the past however after discussing the overall clinical implication critical limb ischemia she is open to the idea of smoking cessation.  Smoking cessation referral  was placed.                    Continue with current medical plan and lifestyle changes.  Return sooner for concerns or questions. If symptoms persist go to the ED  I have reviewed all pertinent data on this patient         I have reviewed the patient's medical history in detail and updated the computerized patient record.           Orders Placed This Encounter   Procedures    X-Ray Foot Complete Right       Standing Status:   Future       Standing Expiration Date:   8/31/2023       Order Specific Question:   May the Radiologist modify the order per protocol to meet the clinical needs of the patient?       Answer:   Yes       Order Specific Question:   Release to patient       Answer:   Immediate    Sedimentation rate       Standing Status:   Future       Standing Expiration Date:   10/30/2023    C-REACTIVE PROTEIN       Standing Status:   Future       Standing Expiration Date:   10/30/2023    CBC Auto Differential       Standing Status:   Future       Standing Expiration Date:   10/30/2023    Comprehensive Metabolic Panel       Standing Status:   Future       Standing Expiration Date:   10/30/2023    Lipid Panel       Standing Status:   Future       Standing Expiration Date:   10/30/2023    Ambulatory referral/consult to Wound Clinic       Standing Status:   Future       Standing Expiration Date:   9/30/2023       Referral Priority:   Routine       Referral Type:   Consultation       Referral Reason:   Specialty Services Required       Requested Specialty:   Wound Care       Number of Visits Requested:   1    Ambulatory referral/consult to Smoking Cessation Program       Standing Status:   Future       Standing Expiration Date:   9/30/2023       Referral Priority:   Routine       Referral Type:   Consultation       Referral Reason:   Specialty Services Required       Requested Specialty:   CTTS       Number of Visits Requested:   1    Ankle Brachial Indices (JUDITH)       Standing Status:   Future       Standing  Expiration Date:   8/31/2023       Order Specific Question:   Exam Type:       Answer:   Resting with toe tracings       Order Specific Question:   Release to patient       Answer:   Immediate    Case Request-Cath Lab: AORTOGRAM, WITH SERIALOGRAPHY       Standing Status:   Standing       Number of Occurrences:   1       Order Specific Question:   CPT Code:       Answer:   HI  ANGIO AORTOBIFEMORAL W CATH [42642]       Order Specific Question:   CPT Code:       Answer:   HI REVASCULARIZE ILIAC ARTERY,ANGIOPLASTY/STENT, INITIAL VESSEL [94156]       Order Specific Question:   CPT Code:       Answer:   HI CORONARY IVUS ONLY [[id number]]       Order Specific Question:   Medical Necessity:       Answer:   Medically Urgent [101]       Order Specific Question:   Is an on-site pathologist required for this procedure?       Answer:   N/A            Follow up as scheduled. Return sooner for concerns or questions                 She expressed verbal understanding and agreed with the plan           Greater than 50% of the visit of 45 minutes was spent counseling, educating, and coordinating the care of the patient.        -In today's visit, at least 4 established conditions that pose a risk to life or bodily function have been addressed and the conditions are severe.     -In today's visit, monitoring for drug toxicity was accomplished.                      Patient's Medications   New Prescriptions     No medications on file   Previous Medications     AMIODARONE (PACERONE) 200 MG TAB    TAKE 1 TABLET BY MOUTH EVERY DAY     AMLODIPINE (NORVASC) 10 MG TABLET    TAKE 1 TABLET BY MOUTH ONCE A DAY     ASPIRIN (ECOTRIN) 81 MG EC TABLET    Take 81 mg by mouth once daily.       CALCIUM-VITAMIN D3 (OS-NOELLE 500 + D3) 500 MG-5 MCG (200 UNIT) PER TABLET    Take 1 tablet by mouth 2 (two) times daily with meals.     CILOSTAZOL (PLETAL) 100 MG TAB    TAKE 1 TABLET BY MOUTH TWO TIMES A DAY     FISH OIL-OMEGA-3 FATTY ACIDS 300-1,000 MG CAPSULE     Take 2 g by mouth once daily.       FUROSEMIDE (LASIX) 20 MG TABLET    TAKE 1 TABLET BY MOUTH EVERY DAY     METOPROLOL TARTRATE (LOPRESSOR) 50 MG TABLET    TAKE 1 TABLET BY MOUTH TWO TIMES A DAY     ROSUVASTATIN (CRESTOR) 10 MG TABLET    TAKE 1 TABLET BY MOUTH EVERY EVENING   Modified Medications     No medications on file   Discontinued Medications     No medications on file

## 2022-09-15 NOTE — Clinical Note
An angiography was performed of the mid and distal infrarenal abdominal aorta  via power injection. Injection was performed with 20 mL contrast at 10 mL/s. No

## 2022-09-15 NOTE — NURSING
Assumed care for this patient in recovery; po fluids and food offered to patient and tolerated; spouse in waiting room and updated; Patient remains flat in bed w/ pillow; Left groin access site soft and drsg cdi w/o any bleeding nor hematoma; radial pulses are palpable bilaterally; DP/PT pulses audible bilaterally w/ doppler; shoe cover on right foot; warmer on patient; NS infusing per gravity in hand off; skin wm dry color pk; pt is alert; no pain reported

## 2022-09-15 NOTE — NURSING
Patient cleared for discharge to home w/ spouse and discharge instructions reviewed with patient and pts spouse at bedside and has instructions and stent card; pt has belongings and walker at bedside w/ black purse and clothes, shoes; assisted patient to dress and assisted up to whch; pt tolerating po fluids and reports does not feel like she has to urinate at this time; Left groin drsg site soft and drsg cdi and no swelling bleeding nor hematoma noted; no change in assessment; pt escorted to pov to spouse per whch with nurse and has walker; pt was able top stand and transfer into pov w/o incident

## 2022-09-15 NOTE — TRANSFER OF CARE
"Anesthesia Transfer of Care Note    Patient: Yanni Car    Procedure(s) Performed: Procedure(s) (LRB):  Stent, Iliac Artery (Right)  ULTRASOUND, INTRAVASCULAR (N/A)  PTA, Iliac Artery    Patient location: Cath Lab    Anesthesia Type: general    Transport from OR: Transported from OR on 2-3 L/min O2 by NC with adequate spontaneous ventilation    Post pain: adequate analgesia    Post assessment: no apparent anesthetic complications and tolerated procedure well    Post vital signs: stable    Level of consciousness: sedated    Nausea/Vomiting: no nausea/vomiting    Complications: none    Transfer of care protocol was followed      Last vitals:   Visit Vitals  /65 (BP Location: Left arm, Patient Position: Lying)   Pulse (!) 55   Temp 36.6 °C (97.9 °F) (Temporal)   Resp 18   Ht 5' 7" (1.702 m)   Wt 77.1 kg (170 lb)   SpO2 97%   Breastfeeding No   BMI 26.63 kg/m²     "

## 2022-09-15 NOTE — PLAN OF CARE
Pt arrived independently with  from home, ambulates with walker. Patient lying in bed with no complaints of pain or distress noted. Monitors applied. VSS, AAOx4.  Yellow non-skid socks placed on patient. Fall risk reviewed with patient. Procedure and recovery process explained to patient and all questions answered.  Patient verbalized understanding, denies questions. Will continue to monitor for safety and needs.

## 2022-09-15 NOTE — DISCHARGE SUMMARY
Discharge Summary  Interventional Cardiology      Admit Date: 9/15/2022    Discharge Date:  9/15/2022    Attending Physician: Julio Nguyen MD    Discharge Physician: Carol Gerardo MD    Principal Diagnoses: <principal problem not specified>  Indication for Admission: Stent, Iliac Artery (Right), ULTRASOUND, INTRAVASCULAR (N/A), PTA, Iliac Artery    Discharged Condition: Good    Hospital Course: Patient presented for outpatient peripheral angiogram and planned intervention to RLE due to CLI which went without complication. See full cath report in Epic for details. Hemostasis of patient's L CFA access site was achieved with manual pressure. Patient was monitored per post-cath protocol, and her L groin access site was c/d/i with no hematoma. Patient was able to ambulate without difficulty. She was feeling well and anticipating discharge home today.     Outpatient Plan:  - Continue EC aspirin 81 mg po daily  - Continue Plavix 75 mg po daily  - Follow up with Dr Nguyen     Diet: Cardiac diet    Activity: Ad chad, wound care instructions provided    Disposition: Home or Self Care        Discharge Medications:      Medication List        CONTINUE taking these medications      amiodarone 200 MG Tab  Commonly known as: PACERONE  TAKE 1 TABLET BY MOUTH EVERY DAY     amLODIPine 10 MG tablet  Commonly known as: NORVASC  TAKE 1 TABLET BY MOUTH ONCE A DAY     aspirin 81 MG EC tablet  Commonly known as: ECOTRIN  Take 1 tablet (81 mg total) by mouth once daily.     calcium-vitamin D3 500 mg-5 mcg (200 unit) per tablet  Commonly known as: OS-NOELLE 500 + D3     cilostazoL 100 MG Tab  Commonly known as: PLETAL  TAKE 1 TABLET BY MOUTH TWO TIMES A DAY     clopidogreL 75 mg tablet  Commonly known as: PLAVIX  Take 1 tablet (75 mg total) by mouth once daily.     fish oil-omega-3 fatty acids 300-1,000 mg capsule     furosemide 20 MG tablet  Commonly known as: LASIX  TAKE 1 TABLET BY MOUTH EVERY DAY     metoprolol tartrate 50 MG  tablet  Commonly known as: LOPRESSOR  TAKE 1 TABLET BY MOUTH TWO TIMES A DAY     rosuvastatin 10 MG tablet  Commonly known as: CRESTOR  TAKE 1 TABLET BY MOUTH EVERY EVENING               Where to Get Your Medications        These medications were sent to [facility] [address], [place] [address]      Hours: Mon-Fri, 8a-5:30p Phone: [phone]   aspirin 81 MG EC tablet

## 2022-09-15 NOTE — Clinical Note
The sheath was repositioned at the tip to the left femoral artery. Tip repositioned to right proximal CFA

## 2022-09-16 ENCOUNTER — HOSPITAL ENCOUNTER (OUTPATIENT)
Dept: WOUND CARE | Facility: HOSPITAL | Age: 82
Discharge: HOME | End: 2022-09-16
Attending: PREVENTIVE MEDICINE
Payer: MEDICARE

## 2022-09-16 VITALS
TEMPERATURE: 99 F | WEIGHT: 170 LBS | BODY MASS INDEX: 26.68 KG/M2 | DIASTOLIC BLOOD PRESSURE: 58 MMHG | HEIGHT: 67 IN | SYSTOLIC BLOOD PRESSURE: 114 MMHG | HEART RATE: 61 BPM

## 2022-09-16 DIAGNOSIS — I70.229 CRITICAL LOWER LIMB ISCHEMIA: ICD-10-CM

## 2022-09-16 DIAGNOSIS — I73.9 PAD (PERIPHERAL ARTERY DISEASE): ICD-10-CM

## 2022-09-16 DIAGNOSIS — I96 DRY GANGRENE: Primary | ICD-10-CM

## 2022-09-16 PROCEDURE — 99203 OFFICE O/P NEW LOW 30 MIN: CPT | Mod: 25

## 2022-09-16 PROCEDURE — 11042 DBRDMT SUBQ TIS 1ST 20SQCM/<: CPT

## 2022-09-16 PROCEDURE — 97597 DBRDMT OPN WND 1ST 20 CM/<: CPT

## 2022-09-16 RX ORDER — RIVAROXABAN 2.5 MG/1
TABLET, FILM COATED ORAL DAILY
COMMUNITY
End: 2023-07-19 | Stop reason: SDUPTHER

## 2022-09-16 NOTE — ANESTHESIA POSTPROCEDURE EVALUATION
Anesthesia Post Evaluation    Patient: Yanni Car    Procedure(s) Performed: Procedure(s) (LRB):  Stent, Iliac Artery (Right)  ULTRASOUND, INTRAVASCULAR (N/A)  PTA, Iliac Artery              Anesthetic complications: no                Vitals Value Taken Time   /60 09/15/22 1832   Temp 36.7 09/16/22 0909   Pulse 68 09/15/22 1831   Resp 42 09/15/22 1830   SpO2 96 % 09/15/22 1831   Vitals shown include unvalidated device data.      No case tracking events are documented in the log.      Pain/Benjy Score: Benjy Score: 10 (9/15/2022  6:30 PM)

## 2022-09-16 NOTE — ANESTHESIA POSTPROCEDURE EVALUATION
Anesthesia Post Evaluation    Patient: Yanni Car    Procedure(s) Performed: Procedure(s) (LRB):  Stent, Iliac Artery (Right)  ULTRASOUND, INTRAVASCULAR (N/A)  PTA, Iliac Artery    Final Anesthesia Type: general          Anesthetic complications: no                Vitals Value Taken Time   /60 09/15/22 1832   Temp 36.7 09/16/22 0910   Pulse 68 09/15/22 1831   Resp 42 09/15/22 1830   SpO2 96 % 09/15/22 1831   Vitals shown include unvalidated device data.      No case tracking events are documented in the log.      Pain/Benjy Score: Benjy Score: 10 (9/15/2022  6:30 PM)

## 2022-09-16 NOTE — PROGRESS NOTES
"                     [facility]    Subjective:       Patient ID: Yanni Car is a 81 y.o. female.    Chief Complaint: Wound Consult    Pt presents to wound care for her right GT wound. Patient and spouse state is started about six months ago, unknown origin, "it started with the second toe".  She has been cleaning it with soap and water and wearing a sock. Had recent revascularization. Planning for more in the future. Complains of pain in the toe.   Review of Systems   All other systems reviewed and are negative.      Objective:      Physical Exam  Cardiovascular:      Pulses:           Dorsalis pedis pulses are detected w/ Doppler on the right side and detected w/ Doppler on the left side.        Posterior tibial pulses are detected w/ Doppler on the right side and detected w/ Doppler on the left side.   Musculoskeletal:      Right lower leg: No edema.      Left lower leg: No edema.          Altered Skin Integrity 09/16/22 1300 Right anterior Foot Arterial Ulcer (Active)   09/16/22 1300   Altered Skin Integrity Present on Admission: yes   Side: Right   Orientation: anterior   Location: Foot   Wound Number:    Is this injury device related?:    Primary Wound Type: Arterial ulc   Description of Altered Skin Integrity:    Ankle-Brachial Index:    Pulses: doppler +2   Removal Indication and Assessment:    Wound Outcome:    (Retired) Wound Length (cm):    (Retired) Wound Width (cm):    (Retired) Depth (cm):    Wound Description (Comments):    Removal Indications:    Wound Image    09/16/22 1300   Dressing Appearance Open to air 09/16/22 1300   Drainage Amount None 09/16/22 1300   Appearance Eschar;Dry 09/16/22 1300   Tissue loss description Not applicable 09/16/22 1300   Black (%), Wound Tissue Color 100 % 09/16/22 1300   Periwound Area Intact;Dry 09/16/22 1300   Wound Edges Rolled/closed 09/16/22 1300   Wound Length (cm) 1.8 cm 09/16/22 1300   Wound Width (cm) 1.7 cm 09/16/22 1300   Wound " Depth (cm) 0 cm 09/16/22 1300   Wound Volume (cm^3) 0 cm^3 09/16/22 1300   Wound Surface Area (cm^2) 3.06 cm^2 09/16/22 1300   Care Cleansed with:;Soap and water;Sterile normal saline;Debrided 09/16/22 1300   Dressing Applied;Island/border 09/16/22 1300   Periwound Care Dry periwound area maintained 09/16/22 1300   Dressing Change Due 09/17/22 09/16/22 1300         Assessment:           ICD-10-CM ICD-9-CM   1. 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 Critical lower limb ischemia  I70.229 459.9   3. PAD (peripheral artery disease)  I73.9 443.9         Plan:   Tissue pathology and/or culture taken:  [] Yes [x] No   Sharp debridement performed:   [x] Yes [] No   Labs ordered this visit:   [] Yes [x] No   Imaging ordered this visit:   [] Yes [x] No     Debridement around the edge of the eschar.      Orders Placed This Encounter   Procedures    Ambulatory referral/consult to Wound Clinic     Standing Status:   Standing     Number of Occurrences:   1     Referral Priority:   Routine     Referral Type:   Consultation     Referral Reason:   Specialty Services Required     Requested Specialty:   Wound Care     Number of Visits Requested:   1    Change dressing     Right first toe wound    Cleanse wound with: NS or soap and water  Lidocaine: PRN  Silver nitrate: PRN  Periwound care: maintain dry periwound  Primary dressing: betadine  Secondary dressing: island border or bandaid  Frequency: daily  Follow-up: in 1 week with Dr Dunaway 9/23/22        Follow up in 1 week (on 9/23/2022) for Dr Dunaway.

## 2022-09-19 NOTE — PROCEDURES
"Debridement    Date/Time: 9/16/2022 12:59 PM  Performed by: Emilio Dunaway MD  Authorized by: Emilio Dunaway MD     Time out: Immediately prior to procedure a "time out" was called to verify the correct patient, procedure, equipment, support staff and site/side marked as required.    Consent Done?:  Yes (Written)  Local anesthesia used?: Yes    Local anesthetic:  Topical anesthetic    Wound Details:    Location:  Right foot    Location:  Right 1st Toe    Type of Debridement:  Excisional       Length (cm):  1.8       Area (sq cm):  3.06       Width (cm):  1.7       Percent Debrided (%):  80       Depth (cm):  0       Total Area Debrided (sq cm):  2.45    Depth of debridement:  Epidermis/Dermis    Tissue debrided:  Dermis and Epidermis    Devitalized tissue debrided:  Necrotic/Eschar and Slough    Instruments:  Blade    Bleeding:  None  Patient tolerance:  Patient tolerated the procedure well with no immediate complications  "

## 2022-09-23 ENCOUNTER — HOSPITAL ENCOUNTER (OUTPATIENT)
Dept: WOUND CARE | Facility: HOSPITAL | Age: 82
Discharge: HOME | End: 2022-09-23
Attending: PREVENTIVE MEDICINE
Payer: MEDICARE

## 2022-09-23 VITALS
TEMPERATURE: 98 F | HEART RATE: 51 BPM | DIASTOLIC BLOOD PRESSURE: 62 MMHG | HEIGHT: 67 IN | WEIGHT: 169 LBS | SYSTOLIC BLOOD PRESSURE: 129 MMHG | BODY MASS INDEX: 26.53 KG/M2

## 2022-09-23 DIAGNOSIS — I70.229 CRITICAL LOWER LIMB ISCHEMIA: ICD-10-CM

## 2022-09-23 DIAGNOSIS — I96 DRY GANGRENE: Primary | ICD-10-CM

## 2022-09-23 DIAGNOSIS — I73.9 PAD (PERIPHERAL ARTERY DISEASE): ICD-10-CM

## 2022-09-23 PROCEDURE — 11042 DBRDMT SUBQ TIS 1ST 20SQCM/<: CPT

## 2022-09-23 NOTE — PROGRESS NOTES
[facility]    Subjective:       Patient ID: Yanni Car is a 81 y.o. female.    Chief Complaint: Arterial Wound Follow Up    Follow up right great toe wound. Complaints of soreness to toe and swelling to leg. States she's elevating legs as much as possible.   Review of Systems   All other systems reviewed and are negative.      Objective:      Physical Exam       Altered Skin Integrity 09/16/22 1300 Right anterior Foot Arterial Ulcer (Active)   09/16/22 1300   Altered Skin Integrity Present on Admission: yes   Side: Right   Orientation: anterior   Location: Foot   Wound Number:    Is this injury device related?:    Primary Wound Type: Arterial ulc   Description of Altered Skin Integrity:    Ankle-Brachial Index:    Pulses: doppler +2   Removal Indication and Assessment:    Wound Outcome:    (Retired) Wound Length (cm):    (Retired) Wound Width (cm):    (Retired) Depth (cm):    Wound Description (Comments):    Removal Indications:    Wound Image   09/23/22 1300   Drainage Amount Scant 09/23/22 1300   Drainage Characteristics/Odor Yellow 09/23/22 1300   Appearance Eschar;Yellow;Moist 09/23/22 1300   Tissue loss description Not applicable 09/23/22 1300   Black (%), Wound Tissue Color 90 % 09/23/22 1300   Yellow (%), Wound Tissue Color 10 % 09/23/22 1300   Periwound Area Intact;Dry 09/23/22 1300   Wound Edges Undefined 09/23/22 1300   Wound Length (cm) 1.8 cm 09/23/22 1300   Wound Width (cm) 1.7 cm 09/23/22 1300   Wound Depth (cm) 0 cm 09/23/22 1300   Wound Volume (cm^3) 0 cm^3 09/23/22 1300   Wound Surface Area (cm^2) 3.06 cm^2 09/23/22 1300   Care Cleansed with:;Sterile normal saline 09/23/22 1300   Dressing Applied;Island/border;Other (comment) 09/23/22 1300   Periwound Care Dry periwound area maintained 09/23/22 1300   Dressing Change Due 09/24/22 09/23/22 1300         Assessment:         ICD-10-CM ICD-9-CM   1. 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 PAD (peripheral artery  disease)  I73.9 443.9   3. Critical lower limb ischemia  I70.229 459.9       Eschar size is decreasing. Not ready to be fully debrided.  Plan:   Tissue pathology and/or culture taken:  [] Yes [x] No   Sharp debridement performed:   [x] Yes [] No   Labs ordered this visit:   [] Yes [x] No   Imaging ordered this visit:   [] Yes [x] No           Orders Placed This Encounter   Procedures    Change dressing     Right first toe wound     Cleanse wound with: NS or soap and water   Lidocaine: PRN   Silver nitrate: PRN   Periwound care: maintain dry periwound   Primary dressing: betadine   Secondary dressing: island border or bandaid   Frequency: daily   Follow-up: in 2 weeks with Dr Dunaway 10/7/22        Follow up in about 2 weeks (around 10/7/2022) for .

## 2022-09-26 NOTE — PROCEDURES
"Debridement    Date/Time: 9/23/2022 1:00 PM  Performed by: Emilio Dunaway MD  Authorized by: Emilio Dunaway MD     Time out: Immediately prior to procedure a "time out" was called to verify the correct patient, procedure, equipment, support staff and site/side marked as required.    Consent Done?:  Yes (Written)  Local anesthesia used?: Yes    Local anesthetic:  Topical anesthetic    Wound Details:    Location:  Right foot    Location:  Right 1st Toe    Type of Debridement:  Excisional       Length (cm):  1.8       Area (sq cm):  3.06       Width (cm):  1.7       Percent Debrided (%):  50       Depth (cm):  0.1       Total Area Debrided (sq cm):  1.53    Depth of debridement:  Subcutaneous tissue    Devitalized tissue debrided:  Slough, Necrotic/Eschar, Fibrin, Exudate and Callus    Instruments:  Curette and Blade    Bleeding:  None  Patient tolerance:  Patient tolerated the procedure well with no immediate complications  "

## 2022-10-07 ENCOUNTER — HOSPITAL ENCOUNTER (OUTPATIENT)
Dept: WOUND CARE | Facility: HOSPITAL | Age: 82
Discharge: HOME | End: 2022-10-07
Attending: PREVENTIVE MEDICINE
Payer: MEDICARE

## 2022-10-07 DIAGNOSIS — I70.229 CRITICAL LOWER LIMB ISCHEMIA: ICD-10-CM

## 2022-10-07 DIAGNOSIS — I73.9 PAD (PERIPHERAL ARTERY DISEASE): ICD-10-CM

## 2022-10-07 DIAGNOSIS — I96 DRY GANGRENE: Primary | ICD-10-CM

## 2022-10-07 PROCEDURE — 97597 DBRDMT OPN WND 1ST 20 CM/<: CPT

## 2022-10-07 NOTE — PROGRESS NOTES
[facility]    Subjective:       Patient ID: Yanni Car is a 81 y.o. female.    Chief Complaint: No chief complaint on file.    Patient to wound care center for right great toe, progressing well.  No new concerns continue same plan of care.  Review of Systems   All other systems reviewed and are negative.      Objective:      Physical Exam       Altered Skin Integrity 09/16/22 1300 Right anterior Foot Arterial Ulcer (Active)   09/16/22 1300   Altered Skin Integrity Present on Admission: yes   Side: Right   Orientation: anterior   Location: Foot   Wound Number:    Is this injury device related?:    Primary Wound Type: Arterial ulc   Description of Altered Skin Integrity:    Ankle-Brachial Index:    Pulses: doppler +2   Removal Indication and Assessment:    Wound Outcome:    (Retired) Wound Length (cm):    (Retired) Wound Width (cm):    (Retired) Depth (cm):    Wound Description (Comments):    Removal Indications:    Wound Image   10/07/22 1336   Drainage Amount Scant 10/07/22 1336   Appearance Yellow 10/07/22 1336   Tissue loss description Not applicable 10/07/22 1336   Black (%), Wound Tissue Color 90 % 10/07/22 1336   Yellow (%), Wound Tissue Color 10 % 10/07/22 1336   Periwound Area Intact;Dry 10/07/22 1336   Wound Edges Undefined 10/07/22 1336   Wound Length (cm) 1.2 cm 10/07/22 1336   Wound Width (cm) 1.5 cm 10/07/22 1336   Wound Depth (cm) 0.1 cm 10/07/22 1336   Wound Volume (cm^3) 0.18 cm^3 10/07/22 1336   Wound Surface Area (cm^2) 1.8 cm^2 10/07/22 1336   Care Cleansed with:;Sterile normal saline 10/07/22 1336   Dressing Applied;Island/border 10/07/22 1336   Periwound Care Dry periwound area maintained 10/07/22 1336   Dressing Change Due 10/21/22 10/07/22 1336         Assessment:         ICD-10-CM ICD-9-CM   1. 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 PAD (peripheral artery disease)  I73.9 443.9   3. Critical lower limb ischemia  I70.229 440.22         Plan:   Tissue  pathology and/or culture taken:  [] Yes [x] No   Sharp debridement performed:   [x] Yes [] No   Labs ordered this visit:   [] Yes [x] No   Imaging ordered this visit:   [] Yes [x] No           Orders Placed This Encounter   Procedures    Change dressing     Right first toe wound     Cleanse wound with: NS or soap and water   Lidocaine: PRN   Silver nitrate: PRN   Periwound care: maintain dry periwound   Primary dressing: betadine   Secondary dressing: island border or bandaid   Frequency: daily   Follow-up: in 2 weeks with Dr Dunaway 10/21/22        Follow up in about 2 weeks (around 10/21/2022).

## 2022-10-14 PROBLEM — I96 DRY GANGRENE: Status: ACTIVE | Noted: 2022-10-14

## 2022-10-14 NOTE — PROCEDURES
"Debridement    Date/Time: 10/7/2022 12:49 PM  Performed by: Emilio Dunaway MD  Authorized by: Emilio Dunaway MD     Time out: Immediately prior to procedure a "time out" was called to verify the correct patient, procedure, equipment, support staff and site/side marked as required.    Consent Done?:  Yes (Written)  Local anesthesia used?: Yes    Local anesthetic:  Topical anesthetic    Wound Details:    Location:  Right foot    Location:  Right 1st Toe    Type of Debridement:  Excisional       Length (cm):  1.2       Area (sq cm):  1.8       Width (cm):  1.5       Percent Debrided (%):  100       Depth (cm):  0.1       Total Area Debrided (sq cm):  1.8    Depth of debridement:  Epidermis/Dermis    Tissue debrided:  Epidermis and Dermis    Devitalized tissue debrided:  Slough, Necrotic/Eschar, Fibrin and Exudate    Instruments:  Curette and Blade    Bleeding:  Minimal  Hemostasis Achieved: Yes    Method Used:  Pressure  "

## 2022-10-21 ENCOUNTER — HOSPITAL ENCOUNTER (OUTPATIENT)
Dept: WOUND CARE | Facility: HOSPITAL | Age: 82
Discharge: HOME | End: 2022-10-21
Attending: PREVENTIVE MEDICINE
Payer: MEDICARE

## 2022-10-21 VITALS
HEIGHT: 67 IN | SYSTOLIC BLOOD PRESSURE: 128 MMHG | HEART RATE: 56 BPM | BODY MASS INDEX: 26.53 KG/M2 | TEMPERATURE: 99 F | DIASTOLIC BLOOD PRESSURE: 58 MMHG | WEIGHT: 169 LBS

## 2022-10-21 DIAGNOSIS — I96 DRY GANGRENE: ICD-10-CM

## 2022-10-21 DIAGNOSIS — L97.511 ISCHEMIC TOE ULCER, RIGHT, LIMITED TO BREAKDOWN OF SKIN: Primary | ICD-10-CM

## 2022-10-21 DIAGNOSIS — I73.9 PAD (PERIPHERAL ARTERY DISEASE): ICD-10-CM

## 2022-10-21 PROCEDURE — 11042 DBRDMT SUBQ TIS 1ST 20SQCM/<: CPT

## 2022-10-21 PROCEDURE — 97597 DBRDMT OPN WND 1ST 20 CM/<: CPT

## 2022-10-21 NOTE — PROGRESS NOTES
[facility]    Subjective:       Patient ID: Yanni Car is a 81 y.o. female.    Chief Complaint: Non-healing Wound Follow Up    Follow up for right first toe ulcer. Wound improving and reports no complaints of pain. Tolerated debridement, continued same treatment.   Review of Systems   All other systems reviewed and are negative.      Objective:      Physical Exam       Altered Skin Integrity 09/16/22 1300 Right anterior Foot Arterial Ulcer (Active)   09/16/22 1300   Altered Skin Integrity Present on Admission: yes   Side: Right   Orientation: anterior   Location: Foot   Wound Number:    Is this injury device related?:    Primary Wound Type: Arterial ulc   Description of Altered Skin Integrity:    Ankle-Brachial Index:    Pulses: doppler +2   Removal Indication and Assessment:    Wound Outcome:    (Retired) Wound Length (cm):    (Retired) Wound Width (cm):    (Retired) Depth (cm):    Wound Description (Comments):    Removal Indications:    Wound Image   10/21/22 1337   Dressing Appearance Dry;Intact 10/21/22 1337   Drainage Amount None 10/21/22 1337   Appearance Eschar;Dry;Black 10/21/22 1337   Tissue loss description Not applicable 10/21/22 1337   Black (%), Wound Tissue Color 100 % 10/21/22 1337   Periwound Area Intact;Dry;Pink 10/21/22 1337   Wound Edges Undefined 10/21/22 1337   Wound Length (cm) 0.5 cm 10/21/22 1337   Wound Width (cm) 0.9 cm 10/21/22 1337   Wound Depth (cm) 0 cm 10/21/22 1337   Wound Volume (cm^3) 0 cm^3 10/21/22 1337   Wound Surface Area (cm^2) 0.45 cm^2 10/21/22 1337   Care Cleansed with:;Sterile normal saline;Debrided 10/21/22 1337   Dressing Applied;Island/border 10/21/22 1337   Periwound Care Absorptive dressing applied;Dry periwound area maintained 10/21/22 1337   Dressing Change Due 10/22/22 10/21/22 1337         Assessment/Plan:         ICD-10-CM ICD-9-CM   1. Ischemic toe ulcer, right, limited to breakdown of skin  L97.511 707.15   2.  Dry gangrene  I96 785.4   3. PAD (peripheral artery disease)  I73.9 443.9           Tissue pathology and/or culture taken:  [] Yes [x] No   Sharp debridement performed:   [x] Yes [] No   Labs ordered this visit:   [] Yes [x] No   Imaging ordered this visit:   [] Yes [x] No     Wound continues to heal. No signs of infection or necrosis.      Orders Placed This Encounter   Procedures    Change dressing     Right first toe wound     Cleanse wound with: NS or soap and water   Lidocaine: PRN   Silver nitrate: PRN   Periwound care: maintain dry periwound   Primary dressing: betadine   Secondary dressing: island border or bandaid   Frequency: daily   Follow-up: in 3 weeks with Dr Dunaway 11/11/22        Follow up in about 3 weeks (around 11/11/2022) for .

## 2022-10-24 NOTE — PROCEDURES
"Debridement    Date/Time: 10/21/2022 1:00 PM  Performed by: Emilio Dunaway MD  Authorized by: Emilio Dunaway MD     Time out: Immediately prior to procedure a "time out" was called to verify the correct patient, procedure, equipment, support staff and site/side marked as required.    Consent Done?:  Yes (Verbal)  Local anesthesia used?: Yes    Local anesthetic:  Topical anesthetic    Wound Details:    Location:  Right foot    Location:  Right 1st Toe    Type of Debridement:  Excisional       Length (cm):  0.5       Area (sq cm):  0.45       Width (cm):  0.9       Percent Debrided (%):  100       Depth (cm):  0       Total Area Debrided (sq cm):  0.45    Depth of debridement:  Epidermis/Dermis    Tissue debrided:  Dermis and Epidermis    Devitalized tissue debrided:  Necrotic/Eschar    Instruments:  Curette    Bleeding:  None  Patient tolerance:  Patient tolerated the procedure well with no immediate complications    "

## 2022-10-25 ENCOUNTER — HOSPITAL ENCOUNTER (OUTPATIENT)
Dept: CARDIOLOGY | Facility: HOSPITAL | Age: 82
Discharge: HOME | End: 2022-10-25
Attending: INTERNAL MEDICINE
Payer: MEDICARE

## 2022-10-25 DIAGNOSIS — I70.229 CRITICAL LOWER LIMB ISCHEMIA: ICD-10-CM

## 2022-10-25 PROCEDURE — 93922 UPR/L XTREMITY ART 2 LEVELS: CPT | Mod: PO

## 2022-10-25 PROCEDURE — 93922 UPR/L XTREMITY ART 2 LEVELS: CPT | Mod: 26,,, | Performed by: INTERNAL MEDICINE

## 2022-10-25 PROCEDURE — 93922 ANKLE BRACHIAL INDICES (ABI): ICD-10-PCS | Mod: 26,,, | Performed by: INTERNAL MEDICINE

## 2022-10-26 ENCOUNTER — OFFICE VISIT (OUTPATIENT)
Dept: CARDIOLOGY | Facility: CLINIC | Age: 82
End: 2022-10-26
Payer: MEDICARE

## 2022-10-26 VITALS
HEIGHT: 66 IN | WEIGHT: 165 LBS | SYSTOLIC BLOOD PRESSURE: 134 MMHG | DIASTOLIC BLOOD PRESSURE: 56 MMHG | HEART RATE: 67 BPM | BODY MASS INDEX: 26.52 KG/M2

## 2022-10-26 DIAGNOSIS — I10 ESSENTIAL HYPERTENSION: ICD-10-CM

## 2022-10-26 DIAGNOSIS — I70.229 CRITICAL LOWER LIMB ISCHEMIA: ICD-10-CM

## 2022-10-26 DIAGNOSIS — R09.89 BILATERAL CAROTID BRUITS: ICD-10-CM

## 2022-10-26 DIAGNOSIS — I73.9 PAD (PERIPHERAL ARTERY DISEASE): Primary | ICD-10-CM

## 2022-10-26 DIAGNOSIS — E78.2 MIXED HYPERLIPIDEMIA: ICD-10-CM

## 2022-10-26 DIAGNOSIS — I48.0 PAROXYSMAL A-FIB: ICD-10-CM

## 2022-10-26 DIAGNOSIS — Z72.0 TOBACCO USE: ICD-10-CM

## 2022-10-26 LAB
LEFT ABI: 2.01
LEFT ARM BP: 125 MMHG
LEFT DORSALIS PEDIS: 108 MMHG
LEFT POSTERIOR TIBIAL: 255 MMHG
LEFT TBI: 0.49
LEFT TOE PRESSURE: 62 MMHG
RIGHT ABI: 2.01
RIGHT ARM BP: 127 MMHG
RIGHT DORSALIS PEDIS: 96 MMHG
RIGHT POSTERIOR TIBIAL: 255 MMHG
RIGHT TBI: 0.43
RIGHT TOE PRESSURE: 55 MMHG

## 2022-10-26 PROCEDURE — 1101F PR PT FALLS ASSESS DOC 0-1 FALLS W/OUT INJ PAST YR: ICD-10-PCS | Mod: CPTII,S$GLB,, | Performed by: INTERNAL MEDICINE

## 2022-10-26 PROCEDURE — 3075F PR MOST RECENT SYSTOLIC BLOOD PRESS GE 130-139MM HG: ICD-10-PCS | Mod: CPTII,S$GLB,, | Performed by: INTERNAL MEDICINE

## 2022-10-26 PROCEDURE — 1126F AMNT PAIN NOTED NONE PRSNT: CPT | Mod: CPTII,S$GLB,, | Performed by: INTERNAL MEDICINE

## 2022-10-26 PROCEDURE — 3288F FALL RISK ASSESSMENT DOCD: CPT | Mod: CPTII,S$GLB,, | Performed by: INTERNAL MEDICINE

## 2022-10-26 PROCEDURE — 99999 PR PBB SHADOW E&M-EST. PATIENT-LVL III: CPT | Mod: PBBFAC,,, | Performed by: INTERNAL MEDICINE

## 2022-10-26 PROCEDURE — 99999 PR PBB SHADOW E&M-EST. PATIENT-LVL III: ICD-10-PCS | Mod: PBBFAC,,, | Performed by: INTERNAL MEDICINE

## 2022-10-26 PROCEDURE — 1101F PT FALLS ASSESS-DOCD LE1/YR: CPT | Mod: CPTII,S$GLB,, | Performed by: INTERNAL MEDICINE

## 2022-10-26 PROCEDURE — 1126F PR PAIN SEVERITY QUANTIFIED, NO PAIN PRESENT: ICD-10-PCS | Mod: CPTII,S$GLB,, | Performed by: INTERNAL MEDICINE

## 2022-10-26 PROCEDURE — 99215 OFFICE O/P EST HI 40 MIN: CPT | Mod: S$GLB,,, | Performed by: INTERNAL MEDICINE

## 2022-10-26 PROCEDURE — 99215 PR OFFICE/OUTPT VISIT, EST, LEVL V, 40-54 MIN: ICD-10-PCS | Mod: S$GLB,,, | Performed by: INTERNAL MEDICINE

## 2022-10-26 PROCEDURE — 3078F PR MOST RECENT DIASTOLIC BLOOD PRESSURE < 80 MM HG: ICD-10-PCS | Mod: CPTII,S$GLB,, | Performed by: INTERNAL MEDICINE

## 2022-10-26 PROCEDURE — 3288F PR FALLS RISK ASSESSMENT DOCUMENTED: ICD-10-PCS | Mod: CPTII,S$GLB,, | Performed by: INTERNAL MEDICINE

## 2022-10-26 PROCEDURE — 3075F SYST BP GE 130 - 139MM HG: CPT | Mod: CPTII,S$GLB,, | Performed by: INTERNAL MEDICINE

## 2022-10-26 PROCEDURE — 1159F MED LIST DOCD IN RCRD: CPT | Mod: CPTII,S$GLB,, | Performed by: INTERNAL MEDICINE

## 2022-10-26 PROCEDURE — 1159F PR MEDICATION LIST DOCUMENTED IN MEDICAL RECORD: ICD-10-PCS | Mod: CPTII,S$GLB,, | Performed by: INTERNAL MEDICINE

## 2022-10-26 PROCEDURE — 3078F DIAST BP <80 MM HG: CPT | Mod: CPTII,S$GLB,, | Performed by: INTERNAL MEDICINE

## 2022-10-26 RX ORDER — CLOPIDOGREL BISULFATE 75 MG/1
75 TABLET ORAL DAILY
Qty: 90 TABLET | Refills: 3 | Status: SHIPPED | OUTPATIENT
Start: 2022-10-26 | End: 2023-10-26

## 2022-10-26 NOTE — PROGRESS NOTES
Subjective:   Patient ID:  Yanni Car is a 81 y.o. female who presents to establish care for  Peripheral Arterial Disease, Hyperlipidemia, Hypertension, and Nicotine Dependence      HPI:       Yanni Car 81 y.o. female is here accompanied by her  by recommendation of her care team for right great toe gangrene.  Wound started nearly in 7/2022. She has no recollection of any trauma. She had 7/10 pain with intermittent jolting sensation. Dafne IV; Breonna 5.  She is no longer having any pain since revascularization the right external iliac and performed the with the residual right SFA occlusion and 3 vessel runoff.  Her wound is nearly healed while attending a wound care.  She continues to smoke.  Her surveillance toe brachial index is 55 mmHg on the right great toe.  She was discharged on Plavix 75 mg once daily and Xarelto 2.5 mg b.i.d..  She stop Plavix because of excessive bruising.        She has a past history of dafne IIb R calf claudication. She was previously under the care of Dr. Valerio. She had PAF several years ago during an hospitalization with SIRS. She was on on coumadin and amiodarone for 2 years. She in NSR She has PAD s/p failed SVG R fem-pop in 8/2013 with bilateral SFA CTOs. She smokes and is very interested in quitting. She takes aspirin 81 mg daily. No CVA/TIA.       ECG 8/2018: sinus bradycardia       Afib was related to SIRS in 1/2012 while she was admitted for pneumonia    Carotid US 9/2016   Patent bilateral ICA   Patent L vertebral   Retrograde flow R vertebral concerning for steal syndrome    Event monitor 8/2016   No afib      Echo 2016:   Normal EF      S/p failed R reverse vein fem-pop done 8/2013   Failure within 2 to 4 weeks         JUDITH 10/2013     R 0.76   L 0.79      US 2013    R , pSFA 112, mSFA 259, dSFA 32, POP 48, PT 38, AT 27, DP 23  Failed fem-pop + R SFA       US 4/2019   Failed R fem-pop   Bilateral SFA CTOs            JUDITH 1/2019     R NC with TBI  0.41   L 1.28 with TBI 0.41   Monophasic waveforms bilaterally       JUDITH 2/2020     NC vessels bilaterally         LE US 7/2022     High grade L JET stenosis    High R EIA stenosis   Bilateral SFA    Patent BTK vessels        UE US 7/2022     No significant disease         S/p left transradial aortogram with run off 9/2022        Patent aorta     Left:     90% ostial left JET  Patent left EIA  80% distal left CFA stenosis      Right:     Patent right JET  80% right EIA stenosis   Patent right CFA stent  90% heavily calcified proximal DFA stenosis  90% heavily calcified proximal SFA stenosis  Mid SFA  with distal reconstitution  90% P3 POP stenosis  3 vessel run off to the right foot       Intervention: 9/2022     Intravascular lithotripsy of left JET with 7 x 60 mm Shockwave balloon  Intravascular lithotripsy of right EIA and right DFA with 7 x 60 mm Shockwave balloon  Final PTA of right DFA with 6.0 x 60 mm Lutonix DCB  Final PTAS of right EIA with 8 x 60 mm Zilver PTX post dilated with 7 x 60 mm balloon  Residual 50% stenosis of left JET; Systolic pressure improved from 50 to 110 mmHg         TBI 10/2022    TBI: right 0.43 (55 mmHg) and left 0.49 (62 mmHg)      Patient Active Problem List    Diagnosis Date Noted    Dry gangrene 10/14/2022    Critical lower limb ischemia 08/31/2022    Decreased strength of lower extremity 11/03/2020    Decreased range of motion of both hips 11/03/2020    Gait abnormality 11/03/2020    Acute fracture 06/24/2020    Closed fracture of left inferior pubic ramus     Closed nondisplaced fracture of anterior wall of left acetabulum     Wheezing     Essential hypertension     Fx sacrum/coccyx-closed 06/23/2020    Fracture of third lumbar vertebra 06/23/2020    Glaucoma, right eye 06/23/2020    CKD (chronic kidney disease) stage 3, GFR 30-59 ml/min 06/23/2020    Common bile duct dilatation 06/23/2020    Long term current use of anticoagulant therapy 08/18/2016    PAD (peripheral  artery disease) 2016       Endovascular intervention 2013    S/p PTAS of L JET 8 x 20 mm Everflex SES at [place]  S/p R CFA PTAS with 7 x 30 mm Everflex SES at [place]       S/p failed R reverse vein fem-pop done 2013   Failure within 2 to 4 weeks         JUDITH 10/2013     R 0.76   L 0.79      US       R , pSFA 112, mSFA 259, dSFA 32, POP 48, PT 38, AT 27, DP 23  Failed fem-pop          JUDITH 2019     R NC with TBI 0.41   L 1.28 with TBI 0.41   Monophasic waveforms bilaterally       Tobacco use 2016    Hyperlipidemia 2016    Paroxysmal A-fib 2016     In NSR today-on amiodarone  [id number] 4 with 4% risk of yearly CVA    Afib was related to SIRS in 2012  She was admitted for pneumonia      2019:    Normal EF   Normal diastolic parameters   No valvular disease                  Bilateral carotid bruits 2016         US      R ICA  cm/sec   L ICA PSV 93 cm/sec   Retrograde R vertebral   Antegrade L vertebral             BCC (basal cell carcinoma), scalp/neck 2012    Post-operative state 2012    Mohs defect of eyelid 2012    Basal cell carcinoma of eyelid 09/10/2012    Canthal dystopia 09/10/2012    Ectropion of eyelid 09/10/2012           Right Arm BP - Sittin/56  Left Arm BP - Sittin/63              Review of Systems   Constitutional: Negative for diaphoresis, night sweats, weight gain and weight loss.   HENT:  Negative for congestion.    Eyes:  Negative for blurred vision, discharge and double vision.   Cardiovascular:  Positive for leg swelling. Negative for chest pain, claudication (R >> L ), cyanosis, dyspnea on exertion, irregular heartbeat, near-syncope, orthopnea, palpitations, paroxysmal nocturnal dyspnea and syncope.   Respiratory:  Negative for cough, shortness of breath and wheezing.    Endocrine: Negative for cold intolerance, heat intolerance and polyphagia.   Hematologic/Lymphatic: Negative for adenopathy and  bleeding problem. Does not bruise/bleed easily.   Skin:  Negative for dry skin and nail changes.   Musculoskeletal:  Negative for arthritis, back pain, falls, joint pain, myalgias and neck pain.   Gastrointestinal:  Negative for bloating, abdominal pain, change in bowel habit and constipation.   Genitourinary:  Negative for bladder incontinence, dysuria, flank pain, genital sores and missed menses.   Neurological:  Negative for aphonia, brief paralysis, difficulty with concentration, dizziness and weakness.   Psychiatric/Behavioral:  Negative for altered mental status and memory loss. The patient does not have insomnia.    Allergic/Immunologic: Negative for environmental allergies.     Objective:   Physical Exam  Constitutional:       Appearance: She is well-developed.      Interventions: She is not intubated.  HENT:      Head: Normocephalic and atraumatic.      Right Ear: External ear normal.      Left Ear: External ear normal.   Eyes:      General: No scleral icterus.        Right eye: No discharge.         Left eye: No discharge.      Conjunctiva/sclera: Conjunctivae normal.      Pupils: Pupils are equal, round, and reactive to light.   Neck:      Thyroid: No thyromegaly.      Vascular: Normal carotid pulses. No carotid bruit, hepatojugular reflux or JVD.      Trachea: No tracheal deviation.   Cardiovascular:      Rate and Rhythm: Normal rate and regular rhythm. No extrasystoles are present.     Chest Wall: PMI is not displaced.      Pulses:           Carotid pulses are 2+ on the right side with bruit and 2+ on the left side with bruit.       Radial pulses are 2+ on the right side and 2+ on the left side.        Femoral pulses are 2+ on the right side and 2+ on the left side.       Popliteal pulses are 2+ on the right side and 2+ on the left side.        Dorsalis pedis pulses are 0 on the right side and 0 on the left side.        Posterior tibial pulses are 0 on the right side and 0 on the left side.      Heart  sounds: S1 normal and S2 normal. Heart sounds not distant. No midsystolic click. No murmur heard.    No friction rub. No gallop. No S3 sounds.      Comments:     Monophasic R PT and biphasic R DP doppler signals     Biphasic L PT and DP doppler signals       Pulmonary:      Effort: Pulmonary effort is normal. No tachypnea, bradypnea, accessory muscle usage or respiratory distress. She is not intubated.      Breath sounds: Normal breath sounds. No stridor. No decreased breath sounds, wheezing or rales.   Chest:      Chest wall: No tenderness.   Abdominal:      General: There is no distension or abdominal bruit.      Palpations: There is no mass or pulsatile mass.      Tenderness: There is no abdominal tenderness. There is no guarding or rebound.   Musculoskeletal:         General: No tenderness. Normal range of motion.      Cervical back: Normal range of motion and neck supple.   Lymphadenopathy:      Cervical: No cervical adenopathy.      Comments: Trace edema of bilateral ankles   Skin:     General: Skin is warm.      Coloration: Skin is not pale.      Findings: No erythema or rash.      Comments:     R great toe gangrene     See images                  Neurological:      Mental Status: She is alert and oriented to person, place, and time.      Cranial Nerves: No cranial nerve deficit.      Coordination: Coordination normal.      Deep Tendon Reflexes: Reflexes are normal and symmetric.   Psychiatric:         Behavior: Behavior normal.         Thought Content: Thought content normal.         Judgment: Judgment normal.       8/2022                        10/2022            Assessment:     1. PAD (peripheral artery disease)    2. Mixed hyperlipidemia    3. Paroxysmal A-fib    4. Bilateral carotid bruits    5. Essential hypertension    6. Critical lower limb ischemia    7. Tobacco use          Plan:       Resume plavix and continue with xarelto 2.5 mg po bid.     We had a long discussion regarding the significance of  peripheral arterial disease and critical limb ischemia and their association with limb loss and high mortality. Continue with wound care.  Guideline directed medical therapy for peripheral arterial disease.        Low salt diet  Exercise  Smoking cessation enrollment  Intense statin therapy  Target BP < 130/80 mmHg      Yearly carotid ultrasound for bruit  Pletal 100 mg bid for claudication  Follow-up in 2 months.          Smoking cessation was discussed  She was not interested in quitting smoking in the past however after discussing the overall clinical implication critical limb ischemia she is open to the idea of smoking cessation.  Smoking cessation referral was placed.      Continue with current medical plan and lifestyle changes.  Return sooner for concerns or questions. If symptoms persist go to the ED  I have reviewed all pertinent data on this patient       I have reviewed the patient's medical history in detail and updated the computerized patient record.    Orders Placed This Encounter   Procedures    Ambulatory referral/consult to Smoking Cessation Program     Standing Status:   Future     Standing Expiration Date:   11/26/2023     Referral Priority:   Routine     Referral Type:   Consultation     Referral Reason:   Specialty Services Required     Requested Specialty:   CTTS     Number of Visits Requested:   1           Follow up as scheduled. Return sooner for concerns or questions            She expressed verbal understanding and agreed with the plan        Greater than 50% of the visit of 45 minutes was spent counseling, educating, and coordinating the care of the patient.      -In today's visit, at least 4 established conditions that pose a risk to life or bodily function have been addressed and the conditions are severe.    -In today's visit, monitoring for drug toxicity was accomplished.            Patient's Medications   New Prescriptions    No medications on file   Previous Medications     AMIODARONE (PACERONE) 200 MG TAB    TAKE 1 TABLET BY MOUTH EVERY DAY    AMLODIPINE (NORVASC) 10 MG TABLET    TAKE 1 TABLET BY MOUTH ONCE A DAY    ASPIRIN (ECOTRIN) 81 MG EC TABLET    Take 1 tablet (81 mg total) by mouth once daily.    CALCIUM-VITAMIN D3 (OS-NOELLE 500 + D3) 500 MG-5 MCG (200 UNIT) PER TABLET    Take 1 tablet by mouth 2 (two) times daily with meals.    CILOSTAZOL (PLETAL) 100 MG TAB    TAKE 1 TABLET BY MOUTH TWO TIMES A DAY    FISH OIL-OMEGA-3 FATTY ACIDS 300-1,000 MG CAPSULE    Take 2 g by mouth once daily.      FUROSEMIDE (LASIX) 20 MG TABLET    TAKE 1 TABLET BY MOUTH EVERY DAY    METOPROLOL TARTRATE (LOPRESSOR) 50 MG TABLET    TAKE 1 TABLET BY MOUTH TWO TIMES A DAY    RIVAROXABAN (XARELTO) 2.5 MG TAB    Take by mouth once daily.    ROSUVASTATIN (CRESTOR) 10 MG TABLET    TAKE 1 TABLET BY MOUTH EVERY EVENING   Modified Medications    Modified Medication Previous Medication    CLOPIDOGREL (PLAVIX) 75 MG TABLET clopidogreL (PLAVIX) 75 mg tablet       Take 1 tablet (75 mg total) by mouth once daily.    Take 1 tablet (75 mg total) by mouth once daily.   Discontinued Medications    No medications on file

## 2022-11-11 ENCOUNTER — HOSPITAL ENCOUNTER (OUTPATIENT)
Dept: WOUND CARE | Facility: HOSPITAL | Age: 82
Discharge: HOME | End: 2022-11-11
Attending: PREVENTIVE MEDICINE
Payer: MEDICARE

## 2022-11-11 VITALS
SYSTOLIC BLOOD PRESSURE: 136 MMHG | DIASTOLIC BLOOD PRESSURE: 61 MMHG | BODY MASS INDEX: 26.52 KG/M2 | HEART RATE: 63 BPM | TEMPERATURE: 99 F | WEIGHT: 165 LBS | HEIGHT: 66 IN

## 2022-11-11 DIAGNOSIS — I96 DRY GANGRENE: Primary | ICD-10-CM

## 2022-11-11 DIAGNOSIS — I73.9 PAD (PERIPHERAL ARTERY DISEASE): ICD-10-CM

## 2022-11-11 DIAGNOSIS — L97.511 ISCHEMIC TOE ULCER, RIGHT, LIMITED TO BREAKDOWN OF SKIN: ICD-10-CM

## 2022-11-11 PROCEDURE — 99212 OFFICE O/P EST SF 10 MIN: CPT

## 2022-11-11 PROCEDURE — 11042 DBRDMT SUBQ TIS 1ST 20SQCM/<: CPT

## 2022-11-11 RX ORDER — MUPIROCIN 20 MG/G
OINTMENT TOPICAL 3 TIMES DAILY
Qty: 22 G | Refills: 0 | Status: SHIPPED | OUTPATIENT
Start: 2022-11-11

## 2022-11-11 NOTE — PROGRESS NOTES
[facility]    Subjective:       Patient ID: Yanni Car is a 81 y.o. female.    Chief Complaint: Non-healing Wound Follow Up    To wound care for right first toe dry gangrene. Reports hitting toe on Tuesday night nail fell off. Denies any pain or drainage.  New orders initiated Discharged from wound care.   Review of Systems   All other systems reviewed and are negative.      Objective:      Physical Exam       Altered Skin Integrity 09/16/22 1300 Right anterior Foot Arterial Ulcer (Active)   09/16/22 1300   Altered Skin Integrity Present on Admission: yes   Side: Right   Orientation: anterior   Location: Foot   Wound Number:    Is this injury device related?:    Primary Wound Type: Arterial ulc   Description of Altered Skin Integrity:    Ankle-Brachial Index:    Pulses: doppler +2   Removal Indication and Assessment:    Wound Outcome:    (Retired) Wound Length (cm):    (Retired) Wound Width (cm):    (Retired) Depth (cm):    Wound Description (Comments):    Removal Indications:    Wound Image    11/11/22 1334   Drainage Amount None 11/11/22 1334   Appearance Jacobs;Dry 11/11/22 1334   Tissue loss description Not applicable 11/11/22 1334   Yellow (%), Wound Tissue Color 100 % 11/11/22 1334   Periwound Area Intact;Dry 11/11/22 1334   Wound Edges Undefined 11/11/22 1334   Wound Length (cm) 0.5 cm 11/11/22 1334   Wound Width (cm) 0.6 cm 11/11/22 1334   Wound Depth (cm) 0.1 cm 11/11/22 1334   Wound Volume (cm^3) 0.03 cm^3 11/11/22 1334   Wound Surface Area (cm^2) 0.3 cm^2 11/11/22 1334   Care Cleansed with:;Sterile normal saline;Debrided 11/11/22 1334   Dressing Applied;Island/border 11/11/22 1334   Periwound Care Dry periwound area maintained 11/11/22 1334   Dressing Change Due 11/12/22 11/11/22 1334         Assessment/Plan:         ICD-10-CM ICD-9-CM   1. 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 Ischemic toe ulcer, right, limited to breakdown of skin  L97.511 707.15   3. PAD  (peripheral artery disease)  I73.9 443.9           Tissue pathology and/or culture taken:  [] Yes [x] No   Sharp debridement performed:   [] Yes [x] No   Labs ordered this visit:   [] Yes [x] No   Imaging ordered this visit:   [] Yes [x] No           Orders Placed This Encounter   Procedures    Change dressing     Right first toe wound   Wash with soap and water daily. Pat dry.  Apply Mupirocin daily x 1 week with border dressing.  RX sent to pharmacy for Mupirocin. Discharged from wound care clinic, return if needed.        Follow up for Discharged from wound care clinic, return if needed. .

## 2023-05-01 NOTE — PROGRESS NOTES
"  Physical Therapy Treatment Note     Name: Yanni Car  Clinic Number: [id number]    Therapy Diagnosis:   No diagnosis found.  Physician: Rajni No MD    Visit Date: 11/16/2020    Physician Orders: PT Eval and Treat   Medical Diagnosis from Referral: S72.002A,S32.82XA (ICD-10-CM) - Traumatic fracture of multiple bones of left hip and pelvis  Evaluation Date: 11/2/2020  Authorization Period Expiration: 1/02/2021  Plan of Care Expiration: 1/1/2021  Visit # / Visits authorized: 5/ 12      Time In: 3:15 pm  Time Out: 4:00 pm  Total Billable Time: 45 minutes    Precautions: Standard and Fall    Subjective     Pt reports: she is feeling good today. Is excited to continue working on walking.  She was compliant with home exercise program.  Response to previous treatment: favorable- felt fine after  Functional change: none yet    Pain: 0/10   Location: left hip        Objective     Yanni received therapeutic exercises to develop strength and balance for 45 minutes including:    Date  11/16/2020 11/11/2020 11/9/2020 11/4/2020   VISIT 5/12 4/12 3/12 2/12   FOTO 5/5 4/5 3/5 2/5   POC EXP. DATE       VISIT AMOUNT  MEDICARE TOTAL  90.96  355.70 90.96  264.74 90.96  173.78   FACE-TO-FACE 12/2/2020 12/2/2020 12/2/2020 12/2/2020          TABLE:       bridges 2 x 10 2 x 10 2 x 10 2 x 10   clams 2 x 10 RTB supine 2 x 10 RTB supine 2 x 10 YTB supine 2 x 10 YTB supine   SLR 2 x 10 1.5# 2 x 10 1# 2 x 10 1# 1 x 10   Pelvic tilts 20x 3" 20x 3" 20x 3"    Pelvic tilts with marching 1 x 10                           SEATED:       LAQ  2 x 10 1.5# 2 x 10 B 1# 2 x 10 B   Hamstring curls  2 x 10 GTB B 2 x 10 RTB B 2 x 10 RTB B   Marching   2 x 10 2 x 10 2 x 10          STANDING:       Marching in parallel bars 2 x 10 2 x 10 2 x 10 2 x 10   SLS 3x B 4x B 4x B 3x    Side stepping  4 x 8" //bars 4 x 8" //bars    Tandem stance 3 x 15" B 3 x 15" B 3x 15" B    Walking w/wide ELIJAH  4 x 8" //bars 4 x 8" //bars    Initials LT LT LT LT     Gait " training for 15 mins: ambulating with SPC- required multiple cues for proper sequencing and keeping the cane close to the body, and to pick feet up while ambulating. 6x 15 ft with SBA from therapist. No LOB. Used a step to pattern and decreased step length and fran compared with RW.    Home Exercises Provided and Patient Education Provided     Education provided:   - Ambulating with wide ELIJAH to provide more stability while walking.    Written Home Exercises Provided: no.  Exercises were reviewed and Yanni was able to demonstrate them prior to the end of the session.  Yanni demonstrated good  understanding of the education provided.     See EMR under none for exercises provided printer broken, will provide exercises at future visit..    Assessment     Yanni continues to progress well with strengthening and with balance and gait activities. Ambulated with a SPC today around the gym. Pt had no LOB, but required multiple cues for proper sequencing and for keeping the cane close to the body. Recommended pt continue using the RW at home until more comfortable using the SPC. Tolerated all exercises well with no increase in symptoms, and no LOB with balance exercises. Tandem stance balance was much improved this visit- with patient able to balance for about 15 secs without having to grab rails. Still only able to stand for about 1 or 2 seconds without grabbing rails with single leg stance.    Yanni is progressing well towards her goals.   Pt prognosis is Good.     Pt will continue to benefit from skilled outpatient physical therapy to address the deficits listed in the problem list box on initial evaluation, provide pt/family education and to maximize pt's level of independence in the home and community environment.     Pt's spiritual, cultural and educational needs considered and pt agreeable to plan of care and goals.     Anticipated barriers to physical therapy: none    Goals: Short Term Goals: 4 weeks   1. Pt  <--- Click to Launch ICDx for PreOp, PostOp and Procedure will understand and be compliant with HEP to promote independent management of current diagnosis.  2. Pt will have increased hip flexion strength to at least 4/5 to decrease shuffling steps during gait and increase safety with ambulation.  3. Pt will be able to ambulate safely with RW for at least 50 ft with no pain to improve ability to ambulate around house.     Long Term Goals: 8 weeks   1. Pt will have decreased FOTO limitation score to demonstrate improved functional mobility.  2. Pt will have increased general LE strength to 5/5 to improve ability to perform sit to stand transfers and improve safety with activities such as toileting.   3. Pt will be able to ambulate safely with least restrictive AD for at least 100 ft to increase ability to ambulate around the house.  4. Pt will have increased hip AROM to at least 95 deg to improve ability to flex hip while ambulating to decrease shuffling steps and improve safety.    Plan     Continue with LE strengthening, gait, and balance as pt tolerates. Ambulating with proper sequencing with SPC. Progress band with juve next visit.    Kailyn Vallecillo, PT

## 2023-06-04 RX ORDER — AMIODARONE HYDROCHLORIDE 200 MG/1
TABLET ORAL
Qty: 90 TABLET | Refills: 3 | Status: SHIPPED | OUTPATIENT
Start: 2023-06-04 | End: 2023-06-09

## 2023-06-09 RX ORDER — AMIODARONE HYDROCHLORIDE 200 MG/1
TABLET ORAL
Qty: 90 TABLET | Refills: 3 | Status: SHIPPED | OUTPATIENT
Start: 2023-06-09

## 2023-06-09 RX ORDER — AMLODIPINE BESYLATE 10 MG/1
10 TABLET ORAL DAILY
Qty: 90 TABLET | Refills: 3 | Status: SHIPPED | OUTPATIENT
Start: 2023-06-09

## 2023-06-30 RX ORDER — CILOSTAZOL 100 MG/1
TABLET ORAL
Qty: 180 TABLET | Refills: 3 | Status: SHIPPED | OUTPATIENT
Start: 2023-06-30

## 2023-07-19 ENCOUNTER — OFFICE VISIT (OUTPATIENT)
Dept: CARDIOLOGY | Facility: CLINIC | Age: 83
End: 2023-07-19
Payer: MEDICARE

## 2023-07-19 VITALS
DIASTOLIC BLOOD PRESSURE: 69 MMHG | HEART RATE: 70 BPM | BODY MASS INDEX: 27.16 KG/M2 | HEIGHT: 66 IN | OXYGEN SATURATION: 95 % | SYSTOLIC BLOOD PRESSURE: 122 MMHG | WEIGHT: 169 LBS

## 2023-07-19 DIAGNOSIS — I10 ESSENTIAL HYPERTENSION: ICD-10-CM

## 2023-07-19 DIAGNOSIS — R26.9 GAIT ABNORMALITY: ICD-10-CM

## 2023-07-19 DIAGNOSIS — E78.2 MIXED HYPERLIPIDEMIA: ICD-10-CM

## 2023-07-19 DIAGNOSIS — N18.30 STAGE 3 CHRONIC KIDNEY DISEASE, UNSPECIFIED WHETHER STAGE 3A OR 3B CKD: ICD-10-CM

## 2023-07-19 DIAGNOSIS — Z72.0 TOBACCO USE: ICD-10-CM

## 2023-07-19 DIAGNOSIS — I73.9 PAD (PERIPHERAL ARTERY DISEASE): Primary | ICD-10-CM

## 2023-07-19 DIAGNOSIS — I48.0 PAROXYSMAL A-FIB: ICD-10-CM

## 2023-07-19 DIAGNOSIS — R09.89 BILATERAL CAROTID BRUITS: ICD-10-CM

## 2023-07-19 PROBLEM — I96 DRY GANGRENE: Status: RESOLVED | Noted: 2022-10-14 | Resolved: 2023-07-19

## 2023-07-19 PROCEDURE — 99999 PR PBB SHADOW E&M-EST. PATIENT-LVL IV: ICD-10-PCS | Mod: PBBFAC,,, | Performed by: INTERNAL MEDICINE

## 2023-07-19 PROCEDURE — 1101F PT FALLS ASSESS-DOCD LE1/YR: CPT | Mod: CPTII,S$GLB,, | Performed by: INTERNAL MEDICINE

## 2023-07-19 PROCEDURE — 3078F DIAST BP <80 MM HG: CPT | Mod: CPTII,S$GLB,, | Performed by: INTERNAL MEDICINE

## 2023-07-19 PROCEDURE — 1126F PR PAIN SEVERITY QUANTIFIED, NO PAIN PRESENT: ICD-10-PCS | Mod: CPTII,S$GLB,, | Performed by: INTERNAL MEDICINE

## 2023-07-19 PROCEDURE — 99215 PR OFFICE/OUTPT VISIT, EST, LEVL V, 40-54 MIN: ICD-10-PCS | Mod: S$GLB,,, | Performed by: INTERNAL MEDICINE

## 2023-07-19 PROCEDURE — 1159F MED LIST DOCD IN RCRD: CPT | Mod: CPTII,S$GLB,, | Performed by: INTERNAL MEDICINE

## 2023-07-19 PROCEDURE — 1101F PR PT FALLS ASSESS DOC 0-1 FALLS W/OUT INJ PAST YR: ICD-10-PCS | Mod: CPTII,S$GLB,, | Performed by: INTERNAL MEDICINE

## 2023-07-19 PROCEDURE — 3078F PR MOST RECENT DIASTOLIC BLOOD PRESSURE < 80 MM HG: ICD-10-PCS | Mod: CPTII,S$GLB,, | Performed by: INTERNAL MEDICINE

## 2023-07-19 PROCEDURE — 3288F FALL RISK ASSESSMENT DOCD: CPT | Mod: CPTII,S$GLB,, | Performed by: INTERNAL MEDICINE

## 2023-07-19 PROCEDURE — 99999 PR PBB SHADOW E&M-EST. PATIENT-LVL IV: CPT | Mod: PBBFAC,,, | Performed by: INTERNAL MEDICINE

## 2023-07-19 PROCEDURE — 1126F AMNT PAIN NOTED NONE PRSNT: CPT | Mod: CPTII,S$GLB,, | Performed by: INTERNAL MEDICINE

## 2023-07-19 PROCEDURE — 3288F PR FALLS RISK ASSESSMENT DOCUMENTED: ICD-10-PCS | Mod: CPTII,S$GLB,, | Performed by: INTERNAL MEDICINE

## 2023-07-19 PROCEDURE — 1159F PR MEDICATION LIST DOCUMENTED IN MEDICAL RECORD: ICD-10-PCS | Mod: CPTII,S$GLB,, | Performed by: INTERNAL MEDICINE

## 2023-07-19 PROCEDURE — 3074F PR MOST RECENT SYSTOLIC BLOOD PRESSURE < 130 MM HG: ICD-10-PCS | Mod: CPTII,S$GLB,, | Performed by: INTERNAL MEDICINE

## 2023-07-19 PROCEDURE — 3074F SYST BP LT 130 MM HG: CPT | Mod: CPTII,S$GLB,, | Performed by: INTERNAL MEDICINE

## 2023-07-19 PROCEDURE — 99215 OFFICE O/P EST HI 40 MIN: CPT | Mod: S$GLB,,, | Performed by: INTERNAL MEDICINE

## 2023-07-19 RX ORDER — RIVAROXABAN 2.5 MG/1
2.5 TABLET, FILM COATED ORAL 2 TIMES DAILY
Qty: 180 TABLET | Refills: 3 | Status: SHIPPED | OUTPATIENT
Start: 2023-07-19

## 2023-07-19 NOTE — PROGRESS NOTES
Subjective:   Patient ID:  Yanni Car is a 82 y.o. female who presents to establish care for  Peripheral Arterial Disease, Hypertension, Hyperlipidemia, and Carotid Artery Disease        HPI:       Yanni Car 82 y.o. female is here for follow up and doing well from a cardiac standpoint. R great toe wound healed after revascularization the right external iliac and performed the with the residual right SFA occlusion and 3 vessel runoff.  She continues to smoke.  Her surveillance toe brachial index is 55 mmHg on the right great toe.  She was discharged on Plavix 75 mg once daily and Xarelto 2.5 mg b.i.d..  She stop aspirin because of excessive bruising.        She has a past history of dafne IIb R calf claudication. She was previously under the care of Dr. Valerio. She had PAF several years ago during an hospitalization with SIRS. She was on on coumadin and amiodarone for 2 years. She in NSR She has PAD s/p failed SVG R fem-pop in 8/2013 with bilateral SFA CTOs. She smokes and is very interested in quitting. She takes aspirin 81 mg daily. No CVA/TIA.       ECG 8/2018: sinus bradycardia       Afib was related to SIRS in 1/2012 while she was admitted for pneumonia    Carotid US 9/2016   Patent bilateral ICA   Patent L vertebral   Retrograde flow R vertebral concerning for steal syndrome    Event monitor 8/2016   No afib      Echo 2016:   Normal EF      S/p failed R reverse vein fem-pop done 8/2013   Failure within 2 to 4 weeks         JUDITH 10/2013     R 0.76   L 0.79      US 2013    R , pSFA 112, mSFA 259, dSFA 32, POP 48, PT 38, AT 27, DP 23  Failed fem-pop + R SFA       US 4/2019   Failed R fem-pop   Bilateral SFA CTOs            JUDITH 1/2019     R NC with TBI 0.41   L 1.28 with TBI 0.41   Monophasic waveforms bilaterally       JUDITH 2/2020     NC vessels bilaterally         LE US 7/2022     High grade L JET stenosis    High R EIA stenosis   Bilateral SFA    Patent BTK vessels        UE US 7/2022     No  significant disease         S/p left transradial aortogram with run off 9/2022        Patent aorta     Left:     90% ostial left JET  Patent left EIA  80% distal left CFA stenosis      Right:     Patent right JET  80% right EIA stenosis   Patent right CFA stent  90% heavily calcified proximal DFA stenosis  90% heavily calcified proximal SFA stenosis  Mid SFA  with distal reconstitution  90% P3 POP stenosis  3 vessel run off to the right foot       Intervention: 9/2022     Intravascular lithotripsy of left JET with 7 x 60 mm Shockwave balloon  Intravascular lithotripsy of right EIA and right DFA with 7 x 60 mm Shockwave balloon  Final PTA of right DFA with 6.0 x 60 mm Lutonix DCB  Final PTAS of right EIA with 8 x 60 mm Zilver PTX post dilated with 7 x 60 mm balloon  Residual 50% stenosis of left JET; Systolic pressure improved from 50 to 110 mmHg         TBI 10/2022    TBI: right 0.43 (55 mmHg) and left 0.49 (62 mmHg)      Patient Active Problem List    Diagnosis Date Noted    Critical lower limb ischemia 08/31/2022    Decreased strength of lower extremity 11/03/2020    Decreased range of motion of both hips 11/03/2020    Gait abnormality 11/03/2020    Acute fracture 06/24/2020    Closed fracture of left inferior pubic ramus     Closed nondisplaced fracture of anterior wall of left acetabulum     Wheezing     Essential hypertension     Fx sacrum/coccyx-closed 06/23/2020    Fracture of third lumbar vertebra 06/23/2020    Glaucoma, right eye 06/23/2020    CKD (chronic kidney disease) stage 3, GFR 30-59 ml/min 06/23/2020    Common bile duct dilatation 06/23/2020    Long term current use of anticoagulant therapy 08/18/2016    PAD (peripheral artery disease) 08/17/2016       Endovascular intervention 8/2013    S/p PTAS of L JET 8 x 20 mm Everflex SES at [place]  S/p R CFA PTAS with 7 x 30 mm Everflex SES at [place]       S/p failed R reverse vein fem-pop done 8/2013   Failure within 2 to 4 weeks         JUDITH  10/2013     R 0.76   L 0.79      US       R , pSFA 112, mSFA 259, dSFA 32, POP 48, PT 38, AT 27, DP 23  Failed fem-pop          JUDITH 2019     R NC with TBI 0.41   L 1.28 with TBI 0.41   Monophasic waveforms bilaterally       Tobacco use 2016    Hyperlipidemia 2016    Paroxysmal A-fib 2016     In NSR today-on amiodarone  [id number] 4 with 4% risk of yearly CVA    Afib was related to SIRS in 2012  She was admitted for pneumonia      2019:    Normal EF   Normal diastolic parameters   No valvular disease                  Bilateral carotid bruits 2016         US      R ICA  cm/sec   L ICA PSV 93 cm/sec   Retrograde R vertebral   Antegrade L vertebral             BCC (basal cell carcinoma), scalp/neck 2012    Post-operative state 2012    Mohs defect of eyelid 2012    Basal cell carcinoma of eyelid 09/10/2012    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   HENT:  Negative for congestion.    Eyes:  Negative for blurred vision, discharge and double vision.   Cardiovascular:  Positive for leg swelling. Negative for chest pain, claudication (R >> L ), cyanosis, dyspnea on exertion, irregular heartbeat, near-syncope, orthopnea, palpitations, paroxysmal nocturnal dyspnea and syncope.   Respiratory:  Negative for cough, shortness of breath and wheezing.    Endocrine: Negative for cold intolerance, heat intolerance and polyphagia.   Hematologic/Lymphatic: Negative for adenopathy and bleeding problem. Does not bruise/bleed easily.   Skin:  Negative for dry skin and nail changes.   Musculoskeletal:  Negative for arthritis, back pain, falls, joint pain, myalgias and neck pain.   Gastrointestinal:  Negative for bloating, abdominal pain, change in bowel habit  and constipation.   Genitourinary:  Negative for bladder incontinence, dysuria, flank pain, genital sores and missed menses.   Neurological:  Negative for aphonia, brief paralysis, difficulty with concentration, dizziness and weakness.   Psychiatric/Behavioral:  Negative for altered mental status and memory loss. The patient does not have insomnia.    Allergic/Immunologic: Negative for environmental allergies.     Objective:   Physical Exam  Constitutional:       Appearance: She is well-developed.      Interventions: She is not intubated.  HENT:      Head: Normocephalic and atraumatic.      Right Ear: External ear normal.      Left Ear: External ear normal.   Eyes:      General: No scleral icterus.        Right eye: No discharge.         Left eye: No discharge.      Conjunctiva/sclera: Conjunctivae normal.      Pupils: Pupils are equal, round, and reactive to light.   Neck:      Thyroid: No thyromegaly.      Vascular: Normal carotid pulses. No carotid bruit, hepatojugular reflux or JVD.      Trachea: No tracheal deviation.   Cardiovascular:      Rate and Rhythm: Normal rate and regular rhythm. No extrasystoles are present.     Chest Wall: PMI is not displaced.      Pulses:           Carotid pulses are 2+ on the right side with bruit and 2+ on the left side with bruit.       Radial pulses are 2+ on the right side and 2+ on the left side.        Femoral pulses are 2+ on the right side and 2+ on the left side.       Popliteal pulses are 2+ on the right side and 2+ on the left side.        Dorsalis pedis pulses are 0 on the right side and 0 on the left side.        Posterior tibial pulses are 0 on the right side and 0 on the left side.      Heart sounds: S1 normal and S2 normal. Heart sounds not distant. No midsystolic click. No murmur heard.    No friction rub. No gallop. No S3 sounds.      Comments:     Monophasic R PT and biphasic R DP doppler signals     Biphasic L PT and DP doppler signals       Pulmonary:       Effort: Pulmonary effort is normal. No tachypnea, bradypnea, accessory muscle usage or respiratory distress. She is not intubated.      Breath sounds: Normal breath sounds. No stridor. No decreased breath sounds, wheezing or rales.   Chest:      Chest wall: No tenderness.   Abdominal:      General: There is no distension or abdominal bruit.      Palpations: There is no mass or pulsatile mass.      Tenderness: There is no abdominal tenderness. There is no guarding or rebound.   Musculoskeletal:         General: No tenderness. Normal range of motion.      Cervical back: Normal range of motion and neck supple.   Lymphadenopathy:      Cervical: No cervical adenopathy.      Comments: no edema of bilateral ankles   Skin:     General: Skin is warm.      Coloration: Skin is not pale.      Findings: No erythema or rash.      Comments:     No ulcers                    Neurological:      Mental Status: She is alert and oriented to person, place, and time.      Cranial Nerves: No cranial nerve deficit.      Coordination: Coordination normal.      Deep Tendon Reflexes: Reflexes are normal and symmetric.   Psychiatric:         Behavior: Behavior normal.         Thought Content: Thought content normal.         Judgment: Judgment normal.       8/2022                        10/2022        11/2022          Assessment:     1. PAD (peripheral artery disease)    2. Mixed hyperlipidemia    3. Paroxysmal A-fib    4. Bilateral carotid bruits    5. Essential hypertension    6. Stage 3 chronic kidney disease, unspecified whether stage 3a or 3b CKD    7. Tobacco use    8. Gait abnormality            Plan:       Continue with plavix and continue with xarelto 2.5 mg po bid.     We had a long discussion regarding the significance of peripheral arterial disease and critical limb ischemia and their association with limb loss and high mortality. Continue with guideline directed medical therapy for peripheral arterial disease.        Low salt  diet  Exercise  Smoking cessation enrollment  Intense statin therapy  Target BP < 130/80 mmHg      Yearly carotid ultrasound for bruit  Pletal 100 mg bid for claudication  Follow-up in a year with labs + TBI         Establish care with podiatry for high risk feet         Smoking cessation was discussed. She was not interested in quitting smoking in the past however after discussing the overall clinical implication critical limb ischemia she is open to the idea of smoking cessation.  Smoking cessation referral was placed.      Continue with current medical plan and lifestyle changes.  Return sooner for concerns or questions. If symptoms persist go to the ED  I have reviewed all pertinent data on this patient       I have reviewed the patient's medical history in detail and updated the computerized patient record.    Orders Placed This Encounter   Procedures    CBC Auto Differential     Standing Status:   Future     Standing Expiration Date:   1/19/2025    Comprehensive Metabolic Panel     Standing Status:   Future     Standing Expiration Date:   1/19/2025    Lipid Panel     Standing Status:   Future     Standing Expiration Date:   1/19/2025    CV Toe Pressures Only     Standing Status:   Future     Standing Expiration Date:   7/19/2024     Order Specific Question:   Release to patient     Answer:   Immediate           Follow up as scheduled. Return sooner for concerns or questions            She expressed verbal understanding and agreed with the plan        Greater than 50% of the visit of 45 minutes was spent counseling, educating, and coordinating the care of the patient.      -In today's visit, at least 4 established conditions that pose a risk to life or bodily function have been addressed and the conditions are severe.    -In today's visit, monitoring for drug toxicity was accomplished.            Patient's Medications   New Prescriptions    No medications on file   Previous Medications    AMIODARONE (PACERONE)  200 MG TAB    TAKE 1 TABLET BY MOUTH EVERY DAY    AMLODIPINE (NORVASC) 10 MG TABLET    Take 1 tablet (10 mg total) by mouth once daily.    ASPIRIN (ECOTRIN) 81 MG EC TABLET    Take 1 tablet (81 mg total) by mouth once daily.    CALCIUM-VITAMIN D3 (OS-NOELLE 500 + D3) 500 MG-5 MCG (200 UNIT) PER TABLET    Take 1 tablet by mouth 2 (two) times daily with meals.    CILOSTAZOL (PLETAL) 100 MG TAB    TAKE 1 TABLET BY MOUTH TWO TIMES A DAY    CLOPIDOGREL (PLAVIX) 75 MG TABLET    Take 1 tablet (75 mg total) by mouth once daily.    FISH OIL-OMEGA-3 FATTY ACIDS 300-1,000 MG CAPSULE    Take 2 g by mouth once daily.      FUROSEMIDE (LASIX) 20 MG TABLET    TAKE 1 TABLET BY MOUTH EVERY DAY    METOPROLOL TARTRATE (LOPRESSOR) 50 MG TABLET    TAKE 1 TABLET BY MOUTH TWO TIMES A DAY    MUPIROCIN (BACTROBAN) 2 % OINTMENT    Apply topically 3 (three) times daily.    RIVAROXABAN (XARELTO) 2.5 MG TAB    Take by mouth once daily.    ROSUVASTATIN (CRESTOR) 10 MG TABLET    TAKE 1 TABLET BY MOUTH EVERY EVENING   Modified Medications    No medications on file   Discontinued Medications    No medications on file

## 2023-07-25 ENCOUNTER — HOSPITAL ENCOUNTER (OUTPATIENT)
Dept: CARDIOLOGY | Facility: HOSPITAL | Age: 83
Discharge: HOME | End: 2023-07-25
Attending: INTERNAL MEDICINE
Payer: MEDICARE

## 2023-07-25 DIAGNOSIS — I73.9 PAD (PERIPHERAL ARTERY DISEASE): ICD-10-CM

## 2023-07-25 PROCEDURE — 93998 UNLISTD NONINVAS VASC DX STD: CPT | Mod: PO

## 2023-07-25 PROCEDURE — 93998 CV US TOE PRESSURES ONLY (CUPID ONLY): ICD-10-PCS | Mod: ,,, | Performed by: INTERNAL MEDICINE

## 2023-07-25 PROCEDURE — 93998 UNLISTD NONINVAS VASC DX STD: CPT | Mod: ,,, | Performed by: INTERNAL MEDICINE

## 2023-07-27 LAB
LEFT ABI: 0.98
LEFT ARM BP: 138 MMHG
LEFT DORSALIS PEDIS: 104 MMHG
LEFT POSTERIOR TIBIAL: 135 MMHG
LEFT TBI: 0.51
LEFT TOE PRESSURE: 71 MMHG
RIGHT ABI: 0.76
RIGHT ARM BP: 133 MMHG
RIGHT DORSALIS PEDIS: 64 MMHG
RIGHT POSTERIOR TIBIAL: 105 MMHG
RIGHT TBI: 0.46
RIGHT TOE PRESSURE: 63 MMHG

## 2024-02-14 RX ORDER — METOPROLOL TARTRATE 50 MG/1
50 TABLET ORAL 2 TIMES DAILY
Qty: 180 TABLET | Refills: 3 | Status: SHIPPED | OUTPATIENT
Start: 2024-02-14

## 2024-05-15 ENCOUNTER — TELEPHONE (OUTPATIENT)
Dept: CARDIOLOGY | Facility: CLINIC | Age: 84
End: 2024-05-15
Payer: MEDICARE

## 2024-05-15 NOTE — TELEPHONE ENCOUNTER
Lenore Car previous [facility] Patient moved from [facility] to [facility]. Patient is scheduled  for [facility] on 06/24/24 @ 9:20  in [place] a letter will sent out and asking for updated numbers.

## 2024-05-28 ENCOUNTER — TELEPHONE (OUTPATIENT)
Dept: CARDIOLOGY | Facility: CLINIC | Age: 84
End: 2024-05-28
Payer: MEDICARE

## 2024-05-28 NOTE — TELEPHONE ENCOUNTER
returned patient call she wanted a later appointment time I changed her time to 1:40pm but the same date. Patient verbally agreed to the new time she said she will be there               ----- Message from Jane De Souza sent at 5/28/2024  1:44 PM CDT -----  Type:  Needs Medical Advice    Who Called: Pt  Would the patient rather a call back or a response via [facility]? Call back  Best Call Back Number: [phone]  Additional Information: pt would like to change appointment time, requesting a call back

## 2024-06-03 ENCOUNTER — TELEPHONE (OUTPATIENT)
Dept: CARDIOLOGY | Facility: CLINIC | Age: 84
End: 2024-06-03
Payer: MEDICARE

## 2024-06-03 NOTE — TELEPHONE ENCOUNTER
----- Message from Jane De Souza sent at 6/3/2024 12:40 PM CDT -----  Type:  Needs Medical Advice    Who Called: pt  Would the patient rather a call back or a response via [facility]? Call back  Best Call Back Number: [phone]  Additional Information: Pt would like to change her appt from [facility] to [place]

## 2024-06-03 NOTE — TELEPHONE ENCOUNTER
returned patient call she wanted to move her appointment to [place] but the provider have not opened his schedule in [place] as of yet. She said okay she will keep her original appointment

## 2024-06-21 NOTE — PROGRESS NOTES
Cardiology Clinic note    Subjective:   Patient ID:  Yanni Car is a 83 y.o. female who presents for follow-up of PAD, A. Fib        HPI:     6/21/2024: Previous patient of Dr. Nguyen as below, initial visit for me. 82 yo F with PAD, pAF, HTN, HLD, carotid stenosis, tobacco use present to clinic for F/U. Seen in clinic with son, reports overall doing well. Stable PAD, denies significant claudication. She continues to smoke, not interested in quitting. Patient denies CP, SOB/ROYAL, orthopnea, PND, syncope, palpitations, LE edema. No reported bleeding on Plavix, Xarelto 2.5bid. No LE wounds noted. Reports compliance and tolerance with all medications.      7/19/2023:  Yanni Car 82 y.o. female is here for follow up and doing well from a cardiac standpoint. R great toe wound healed after revascularization the right external iliac and performed the with the residual right SFA occlusion and 3 vessel runoff.  She continues to smoke.  Her surveillance toe brachial index is 55 mmHg on the right great toe.  She was discharged on Plavix 75 mg once daily and Xarelto 2.5 mg b.i.d..  She stop aspirin because of excessive bruising.     She has a past history of dafne IIb R calf claudication. She was previously under the care of Dr. Valerio. She had PAF several years ago during an hospitalization with SIRS. She was on on coumadin and amiodarone for 2 years. She in NSR She has PAD s/p failed SVG R fem-pop in 8/2013 with bilateral SFA CTOs. She smokes and is very interested in quitting. She takes aspirin 81 mg daily. No CVA/TIA.             Cardiac Hx  --------------------------------------------  ECG 6/24/24: NSR, anterseptal infarct, age undetermined, QTc 436, rate 62    ECG 8/2018: sinus bradycardia      Afib was related to SIRS in 1/2012 while she was admitted for pneumonia     Carotid US 9/2016              Patent bilateral ICA              Patent L vertebral              Retrograde flow R vertebral concerning for steal  syndrome     Event monitor 8/2016              No afib        Echo 2016:              Normal EF        S/p failed R reverse vein fem-pop done 8/2013              Failure within 2 to 4 weeks                      JUDITH 10/2013                 R 0.76              L 0.79        US 2013     R , pSFA 112, mSFA 259, dSFA 32, POP 48, PT 38, AT 27, DP 23  Failed fem-pop + R SFA         US 4/2019              Failed R fem-pop              Bilateral SFA CTOs              JUDITH 1/2019                 R NC with TBI 0.41              L 1.28 with TBI 0.41              Monophasic waveforms bilaterally         JUDITH 2/2020                 NC vessels bilaterally            LE US 7/2022                 High grade L JET stenosis               High R EIA stenosis              Bilateral SFA               Patent BTK vessels                    UE US 7/2022                 No significant disease            S/p left transradial aortogram with run off 9/2022        Patent aorta     Left:     90% ostial left JET  Patent left EIA  80% distal left CFA stenosis      Right:     Patent right JET  80% right EIA stenosis   Patent right CFA stent  90% heavily calcified proximal DFA stenosis  90% heavily calcified proximal SFA stenosis  Mid SFA  with distal reconstitution  90% P3 POP stenosis  3 vessel run off to the right foot         Intervention: 9/2022     Intravascular lithotripsy of left JET with 7 x 60 mm Shockwave balloon  Intravascular lithotripsy of right EIA and right DFA with 7 x 60 mm Shockwave balloon  Final PTA of right DFA with 6.0 x 60 mm Lutonix DCB  Final PTAS of right EIA with 8 x 60 mm Zilver PTX post dilated with 7 x 60 mm balloon  Residual 50% stenosis of left JET; Systolic pressure improved from 50 to 110 mmHg            TBI 10/2022     TBI: right 0.43 (55 mmHg) and left 0.49 (62 mmHg)    Past Medical History:   Diagnosis Date    Arthritis     Basal cell carcinoma     Hypertension     PAF (paroxysmal atrial  fibrillation) 8/17/2016    In NSR today-on amiodarone [id number] 4 with 4% risk of yearly CVA  Afib was related to SIRS in 1/2012 She was admitted for pneumonia   1/2019:   Normal EF  Normal diastolic parameters  No valvular disease                 Patient Active Problem List    Diagnosis Date Noted    Critical lower limb ischemia 08/31/2022    Decreased strength of lower extremity 11/03/2020    Decreased range of motion of both hips 11/03/2020    Gait abnormality 11/03/2020    Acute fracture 06/24/2020    Closed fracture of left inferior pubic ramus     Closed nondisplaced fracture of anterior wall of left acetabulum     Wheezing     Essential hypertension     Fx sacrum/coccyx-closed 06/23/2020    Fracture of third lumbar vertebra 06/23/2020    Glaucoma, right eye 06/23/2020    CKD (chronic kidney disease) stage 3, GFR 30-59 ml/min 06/23/2020    Common bile duct dilatation 06/23/2020    Long term current use of anticoagulant therapy 08/18/2016    PAD (peripheral artery disease) 08/17/2016       Endovascular intervention 8/2013    S/p PTAS of L JET 8 x 20 mm Everflex SES at [place]  S/p R CFA PTAS with 7 x 30 mm Everflex SES at [place]       S/p failed R reverse vein fem-pop done 8/2013   Failure within 2 to 4 weeks         JUDITH 10/2013     R 0.76   L 0.79      US 2013      R , pSFA 112, mSFA 259, dSFA 32, POP 48, PT 38, AT 27, DP 23  Failed fem-pop          JUDITH 1/2019     R NC with TBI 0.41   L 1.28 with TBI 0.41   Monophasic waveforms bilaterally       Tobacco use 08/17/2016    Hyperlipidemia 08/17/2016    Paroxysmal A-fib 08/17/2016     In NSR today-on amiodarone  [id number] 4 with 4% risk of yearly CVA    Afib was related to SIRS in 1/2012  She was admitted for pneumonia      1/2019:    Normal EF   Normal diastolic parameters   No valvular disease                  Bilateral carotid bruits 08/17/2016         US 1/20919     R ICA  cm/sec   L ICA PSV 93 cm/sec   Retrograde R  vertebral   Antegrade L vertebral             BCC (basal cell carcinoma), scalp/neck 09/26/2012    Post-operative state 09/17/2012    Mohs defect of eyelid 09/17/2012    Basal cell carcinoma of eyelid 09/10/2012    Canthal dystopia 09/10/2012    Ectropion of eyelid 09/10/2012       Patient's Medications   New Prescriptions    No medications on file   Previous Medications    AMIODARONE (PACERONE) 200 MG TAB    TAKE 1 TABLET BY MOUTH EVERY DAY    AMLODIPINE (NORVASC) 10 MG TABLET    Take 1 tablet (10 mg total) by mouth once daily.    CALCIUM-VITAMIN D3 (OS-NOELLE 500 + D3) 500 MG-5 MCG (200 UNIT) PER TABLET    Take 1 tablet by mouth 2 (two) times daily with meals.    CILOSTAZOL (PLETAL) 100 MG TAB    TAKE 1 TABLET BY MOUTH TWO TIMES A DAY    CLOPIDOGREL (PLAVIX) 75 MG TABLET    Take 1 tablet (75 mg total) by mouth once daily.    FISH OIL-OMEGA-3 FATTY ACIDS 300-1,000 MG CAPSULE    Take 2 g by mouth once daily.      FUROSEMIDE (LASIX) 20 MG TABLET    TAKE 1 TABLET BY MOUTH EVERY DAY    METOPROLOL TARTRATE (LOPRESSOR) 50 MG TABLET    Take 1 tablet (50 mg total) by mouth 2 (two) times daily.    MUPIROCIN (BACTROBAN) 2 % OINTMENT    Apply topically 3 (three) times daily.    RIVAROXABAN (XARELTO) 2.5 MG TAB    Take 1 tablet (2.5 mg total) by mouth 2 (two) times daily.    ROSUVASTATIN (CRESTOR) 10 MG TABLET    TAKE 1 TABLET BY MOUTH EVERY EVENING   Modified Medications    No medications on file   Discontinued Medications    No medications on file        Review of Systems   Constitutional: Negative for chills, decreased appetite, diaphoresis, malaise/fatigue, weight gain and weight loss.   Cardiovascular:  Negative for chest pain, claudication, dyspnea on exertion, irregular heartbeat, leg swelling, near-syncope, orthopnea, palpitations, paroxysmal nocturnal dyspnea and syncope.   Respiratory:  Negative for cough, hemoptysis, shortness of breath and snoring.    Gastrointestinal:  Negative for bloating, abdominal pain, nausea  "and vomiting.   Neurological:  Negative for light-headedness and weakness.       Family History   Problem Relation Name Age of Onset    Cancer Father      Amblyopia Neg Hx      Blindness Neg Hx      Cataracts Neg Hx      Diabetes Neg Hx      Glaucoma Neg Hx      Hypertension Neg Hx      Macular degeneration Neg Hx      Retinal detachment Neg Hx      Strabismus Neg Hx      Stroke Neg Hx      Thyroid disease Neg Hx         Social History     Socioeconomic History    Marital status:    Tobacco Use    Smoking status: Every Day     Current packs/day: 0.25     Average packs/day: 0.3 packs/day for 68.5 years (17.1 ttl pk-yrs)     Types: Cigarettes     Start date: 1956    Smokeless tobacco: Never    Tobacco comments:     Pt declines enrollment in the [employer].  Handout provided for [employer] Cessation program.    Substance and Sexual Activity    Alcohol use: No            Objective:   Vitals  Vitals:    06/24/24 1352 06/24/24 1353   BP: 106/65 122/66   Pulse: 63    SpO2: 97%    Weight: 76.3 kg (168 lb 3.4 oz)    Height: 5' 6" (1.676 m)           Physical Exam  Vitals and nursing note reviewed.   Constitutional:       Appearance: Normal appearance.   Cardiovascular:      Rate and Rhythm: Normal rate and regular rhythm.      Pulses:           Dorsalis pedis pulses are detected w/ Doppler on the right side and detected w/ Doppler on the left side.        Posterior tibial pulses are detected w/ Doppler on the right side and detected w/ Doppler on the left side.      Heart sounds: No murmur heard.     No gallop.   Pulmonary:      Effort: Pulmonary effort is normal.      Breath sounds: Normal breath sounds.   Abdominal:      General: Bowel sounds are normal. There is no distension.      Palpations: Abdomen is soft.      Tenderness: There is no abdominal tenderness.   Skin:     General: Skin is warm and dry.   Neurological:      Mental Status: She is alert and oriented to person, place, and time.         Lab " Results    Lab Results   Component Value Date    WBC 6.18 09/13/2022    HGB 13.7 09/13/2022    HCT 41.6 09/13/2022    MCV 95 09/13/2022       Lab Results   Component Value Date     09/13/2022    INR 2.8 (H) 08/01/2018       Lab Results   Component Value Date    K 3.7 09/15/2022    MG 2.2 06/24/2020    BUN 12 09/15/2022    CREATININE 1.1 09/15/2022       Lab Results   Component Value Date     09/15/2022    HGBA1C 6.1 (H) 06/23/2020       Lab Results   Component Value Date    AST 20 08/31/2022    ALT 20 08/31/2022    ALBUMIN 4.3 08/31/2022    PROT 6.9 08/31/2022       Lab Results   Component Value Date    CHOL 144 08/31/2022    HDL 58 08/31/2022    LDLCALC 62.0 (L) 08/31/2022    TRIG 120 08/31/2022       Lab Results   Component Value Date    CRP 0.26 08/31/2022         Assessment:     1. PAD (peripheral artery disease)    2. Mixed hyperlipidemia    3. Paroxysmal A-fib    4. Bilateral carotid bruits    5. Essential hypertension    6. Stage 3 chronic kidney disease, unspecified whether stage 3a or 3b CKD    7. Long term current use of anticoagulant therapy    8. Tobacco use        Plan:     PAD  -stable, will update JUDITH  -continue Plavix, pletal, Xarelto    2. A. Fib  -continue amio  -on Plavix and Xarelto (PAD dosing) - continue     3. HTN  -well controlled, continue medication regimen as above    4. HLD  -goal LDLc < 70, at goal  -will update labs  -continue statin    5. Carotid stenosis  -continue monitoring with yearly U/S    6. Tobacco abuse  -cessation education, not interested in quiting       -We had a long discussion regarding the significance of peripheral arterial disease and critical limb ischemia and their association with limb loss and high mortality. Continue with guideline directed medical therapy for peripheral arterial disease.     -Low salt diet  -Exercise  -Smoking cessation enrollment  -Intense statin therapy  -Target BP < 130/80 mmHg        -Yearly carotid ultrasound for  bruit  -Pletal 100 mg bid for claudication    The ASCVD Risk score (Shira DK, et al., 2019) failed to calculate for the following reasons:    The 2019 ASCVD risk score is only valid for ages 40 to 79    Orders Placed This Encounter   Procedures    VAS Ankle Brachial Indices With Toe Tracings     Standing Status:   Future     Standing Expiration Date:   6/21/2025     Order Specific Question:   Release to patient     Answer:   Immediate    Comprehensive Metabolic Panel     Standing Status:   Future     Standing Expiration Date:   9/22/2025    Lipid Panel     Standing Status:   Future     Standing Expiration Date:   9/22/2025    CBC Auto Differential     Standing Status:   Future     Standing Expiration Date:   9/22/2025    CV Ultrasound Bilateral Doppler Carotid     Standing Status:   Future     Standing Expiration Date:   6/21/2025     Order Specific Question:   Release to patient     Answer:   Immediate    IN OFFICE EKG 12-LEAD (to Muse)       She expressed verbal understanding and agreed with the plan    Follow up in 12m    Pertinent cardiac images and EKG reviewed independently.    Continue with current medical plan and lifestyle changes.  Return sooner for concerns or questions. If symptoms persist go to the ED.  I have reviewed all pertinent data including patient's medical history in detail and updated the computerized patient record.     Counseling included discussion regarding imaging findings, diagnosis, possibilities, treatment options, risks and benefits.    Thank you for the opportunity to care for this patient. Will be available for questions if needed.        Signed:  Jose Alfredo Goddard DNP  06/24/2024

## 2024-06-24 ENCOUNTER — OFFICE VISIT (OUTPATIENT)
Dept: CARDIOLOGY | Facility: CLINIC | Age: 84
End: 2024-06-24
Payer: MEDICARE

## 2024-06-24 VITALS
WEIGHT: 168.19 LBS | HEART RATE: 63 BPM | SYSTOLIC BLOOD PRESSURE: 122 MMHG | OXYGEN SATURATION: 97 % | BODY MASS INDEX: 27.03 KG/M2 | HEIGHT: 66 IN | DIASTOLIC BLOOD PRESSURE: 66 MMHG

## 2024-06-24 DIAGNOSIS — Z72.0 TOBACCO USE: ICD-10-CM

## 2024-06-24 DIAGNOSIS — I48.0 PAROXYSMAL A-FIB: ICD-10-CM

## 2024-06-24 DIAGNOSIS — I10 ESSENTIAL HYPERTENSION: ICD-10-CM

## 2024-06-24 DIAGNOSIS — I73.9 PAD (PERIPHERAL ARTERY DISEASE): Primary | ICD-10-CM

## 2024-06-24 DIAGNOSIS — N18.30 STAGE 3 CHRONIC KIDNEY DISEASE, UNSPECIFIED WHETHER STAGE 3A OR 3B CKD: ICD-10-CM

## 2024-06-24 DIAGNOSIS — Z79.01 LONG TERM CURRENT USE OF ANTICOAGULANT THERAPY: ICD-10-CM

## 2024-06-24 DIAGNOSIS — R09.89 BILATERAL CAROTID BRUITS: ICD-10-CM

## 2024-06-24 DIAGNOSIS — E78.2 MIXED HYPERLIPIDEMIA: ICD-10-CM

## 2024-06-24 PROCEDURE — 3288F FALL RISK ASSESSMENT DOCD: CPT | Mod: CPTII,S$GLB,,

## 2024-06-24 PROCEDURE — 99214 OFFICE O/P EST MOD 30 MIN: CPT | Mod: S$GLB,,,

## 2024-06-24 PROCEDURE — 1101F PT FALLS ASSESS-DOCD LE1/YR: CPT | Mod: CPTII,S$GLB,,

## 2024-06-24 PROCEDURE — 3078F DIAST BP <80 MM HG: CPT | Mod: CPTII,S$GLB,,

## 2024-06-24 PROCEDURE — 99999 PR PBB SHADOW E&M-EST. PATIENT-LVL III: CPT | Mod: PBBFAC,,,

## 2024-06-24 PROCEDURE — 3074F SYST BP LT 130 MM HG: CPT | Mod: CPTII,S$GLB,,

## 2024-06-24 PROCEDURE — 1159F MED LIST DOCD IN RCRD: CPT | Mod: CPTII,S$GLB,,

## 2024-06-27 LAB
OHS QRS DURATION: 74 MS
OHS QTC CALCULATION: 436 MS

## 2024-07-17 ENCOUNTER — HOSPITAL ENCOUNTER (OUTPATIENT)
Dept: CARDIOLOGY | Facility: HOSPITAL | Age: 84
Discharge: HOME | End: 2024-07-17
Payer: MEDICARE

## 2024-07-17 VITALS — HEIGHT: 66 IN | BODY MASS INDEX: 27 KG/M2 | WEIGHT: 168 LBS

## 2024-07-17 DIAGNOSIS — R09.89 BILATERAL CAROTID BRUITS: ICD-10-CM

## 2024-07-17 DIAGNOSIS — I73.9 PAD (PERIPHERAL ARTERY DISEASE): ICD-10-CM

## 2024-07-17 PROCEDURE — 93880 EXTRACRANIAL BILAT STUDY: CPT

## 2024-07-17 PROCEDURE — 93922 UPR/L XTREMITY ART 2 LEVELS: CPT

## 2024-07-17 PROCEDURE — 93922 UPR/L XTREMITY ART 2 LEVELS: CPT | Mod: 26,,, | Performed by: INTERNAL MEDICINE

## 2024-07-17 PROCEDURE — 93880 EXTRACRANIAL BILAT STUDY: CPT | Mod: 26,,, | Performed by: INTERNAL MEDICINE

## 2024-07-18 LAB
LEFT ABI: 0.96
LEFT ARM BP: 126 MMHG
LEFT DORSALIS PEDIS: 125 MMHG
LEFT POSTERIOR TIBIAL: 122 MMHG
LEFT TBI: 0.49
LEFT TOE PRESSURE: 64 MMHG
RIGHT ABI: 1.95
RIGHT ARM BP: 130 MMHG
RIGHT DORSALIS PEDIS: 254 MMHG
RIGHT POSTERIOR TIBIAL: 135 MMHG
RIGHT TBI: 0.41
RIGHT TOE PRESSURE: 53 MMHG

## 2024-07-19 ENCOUNTER — TELEPHONE (OUTPATIENT)
Dept: CARDIOLOGY | Facility: CLINIC | Age: 84
End: 2024-07-19
Payer: MEDICARE

## 2024-07-19 LAB
LEFT CBA DIAS: 9 CM/S
LEFT CBA SYS: 70 CM/S
LEFT CCA DIST DIAS: 10 CM/S
LEFT CCA DIST SYS: 73 CM/S
LEFT CCA PROX DIAS: 8 CM/S
LEFT CCA PROX SYS: 68 CM/S
LEFT ECA SYS: 232 CM/S
LEFT ICA DIST DIAS: 14 CM/S
LEFT ICA DIST SYS: 72 CM/S
LEFT ICA MID DIAS: 14 CM/S
LEFT ICA MID SYS: 102 CM/S
LEFT ICA PROX DIAS: 10 CM/S
LEFT ICA PROX SYS: 92 CM/S
LEFT VERTEBRAL DIAS: 10 CM/S
LEFT VERTEBRAL SYS: 78 CM/S
OHS CV CAROTID RIGHT ICA EDV HIGHEST: 13
OHS CV CAROTID ULTRASOUND LEFT ICA/CCA RATIO: 1.4
OHS CV CAROTID ULTRASOUND RIGHT ICA/CCA RATIO: 0.74
OHS CV PV CAROTID LEFT HIGHEST CCA: 73
OHS CV PV CAROTID LEFT HIGHEST ICA: 102
OHS CV PV CAROTID RIGHT HIGHEST CCA: 122
OHS CV PV CAROTID RIGHT HIGHEST ICA: 90
OHS CV US CAROTID LEFT HIGHEST EDV: 14
RIGHT CBA DIAS: 11 CM/S
RIGHT CBA SYS: 175 CM/S
RIGHT CCA DIST DIAS: 10 CM/S
RIGHT CCA DIST SYS: 122 CM/S
RIGHT CCA PROX DIAS: 7 CM/S
RIGHT CCA PROX SYS: 94 CM/S
RIGHT ECA DIAS: 18 CM/S
RIGHT ECA SYS: 335 CM/S
RIGHT ICA DIST DIAS: 13 CM/S
RIGHT ICA DIST SYS: 78 CM/S
RIGHT ICA MID DIAS: 10 CM/S
RIGHT ICA MID SYS: 56 CM/S
RIGHT ICA PROX DIAS: 9 CM/S
RIGHT ICA PROX SYS: 90 CM/S

## 2024-07-19 NOTE — TELEPHONE ENCOUNTER
----- Message from Jose Alfredo Goddard DNP sent at 7/19/2024 10:59 AM CDT -----  Please contact the patient and let them know that their results were fine and do not require any change in treatment.

## 2024-08-01 DIAGNOSIS — I48.0 PAROXYSMAL A-FIB: Primary | ICD-10-CM

## 2024-08-01 DIAGNOSIS — I10 ESSENTIAL HYPERTENSION: ICD-10-CM

## 2024-08-01 RX ORDER — AMLODIPINE BESYLATE 10 MG/1
10 TABLET ORAL DAILY
Qty: 90 TABLET | Refills: 3 | Status: SHIPPED | OUTPATIENT
Start: 2024-08-01

## 2024-08-01 RX ORDER — AMLODIPINE BESYLATE 10 MG/1
10 TABLET ORAL DAILY
Qty: 90 TABLET | Refills: 3 | Status: CANCELLED | OUTPATIENT
Start: 2024-08-01

## 2024-08-01 RX ORDER — AMIODARONE HYDROCHLORIDE 200 MG/1
200 TABLET ORAL DAILY
Qty: 90 TABLET | Refills: 3 | OUTPATIENT
Start: 2024-08-01

## 2024-08-01 RX ORDER — AMIODARONE HYDROCHLORIDE 200 MG/1
200 TABLET ORAL DAILY
Qty: 90 TABLET | Refills: 3 | Status: CANCELLED | OUTPATIENT
Start: 2024-08-01

## 2024-08-01 RX ORDER — AMLODIPINE BESYLATE 10 MG/1
10 TABLET ORAL DAILY
Qty: 90 TABLET | Refills: 3 | OUTPATIENT
Start: 2024-08-01

## 2024-08-01 RX ORDER — AMIODARONE HYDROCHLORIDE 200 MG/1
200 TABLET ORAL DAILY
Qty: 90 TABLET | Refills: 3 | Status: SHIPPED | OUTPATIENT
Start: 2024-08-01

## 2024-08-01 NOTE — TELEPHONE ENCOUNTER
----- Message from Nicki Rojo sent at 8/1/2024  1:01 PM CDT -----  Type:  RX Refill Request    Who Called: Pt jaymie schwartz  Refill or New Rx: Fefill  RX Name and Strength:   amiodarone (PACERONE) 200 MG Tab [9066   How is the patient currently taking it? (ex. 1XDay): TAKE 1 TABLET BY MOUTH EVERY DAY  Is this a 30 day or 90 day RX: 90  Preferred Pharmacy with phone number: [address]   Phone: [phone]  Fax: [phone]  Local or Mail Order: local  Ordering Provider: Wendy  Would the patient rather a call back or a response via [facility]?  call  Best Call Back Number:  [phone]  Additional Information:       Type:  RX Refill Request    Who Called:  Pt son   Refill or New Rx: Refill  RX Name and Strength:   amLODIPine (NORVASC) 10 MG tablet [9069]   How is the patient currently taking it? (ex. 1XDay):  Take 1 tablet (10 mg total) by mouth once daily. - Oral  Is this a 30 day or 90 day RX: 90

## 2024-08-01 NOTE — TELEPHONE ENCOUNTER
----- Message from Nicki Rojo sent at 8/1/2024  1:01 PM CDT -----  Type:  RX Refill Request    Who Called: Pt jaymie schwartz  Refill or New Rx: Fefill  RX Name and Strength:   amiodarone (PACERONE) 200 MG Tab [9066   How is the patient currently taking it? (ex. 1XDay): TAKE 1 TABLET BY MOUTH EVERY DAY  Is this a 30 day or 90 day RX: 90  Preferred Pharmacy with phone number: [address]   Phone: [phone]  Fax: [phone]  Local or Mail Order: local  Ordering Provider: Wendy  Would the patient rather a call back or a response via [facility]?  call  Best Call Back Number:  [phone]  Additional Information:       Type:  RX Refill Request    Who Called:  Pt son   Refill or New Rx: Refill  RX Name and Strength:   amLODIPine (NORVASC) 10 MG tablet [9069]   How is the patient currently taking it? (ex. 1XDay):  Take 1 tablet (10 mg total) by mouth once daily. - Oral  Is this a 30 day or 90 day RX: 90

## 2024-12-12 DIAGNOSIS — I73.9 PAD (PERIPHERAL ARTERY DISEASE): Primary | ICD-10-CM

## 2024-12-12 RX ORDER — CILOSTAZOL 100 MG/1
100 TABLET ORAL 2 TIMES DAILY
Qty: 180 TABLET | Refills: 3 | Status: SHIPPED | OUTPATIENT
Start: 2024-12-12

## 2024-12-12 NOTE — TELEPHONE ENCOUNTER
----- Message from Garrick sent at 12/12/2024  9:23 AM CST -----  Type:  RX Refill Request    Who Called: pt's son   Refill or New Rx:rx  RX Name and Strength:cilostazoL (PLETAL) 100 MG Tab  Preferred Pharmacy with phone number:[facility] [address]  Local or Mail Order:local   Ordering Provider:nila  Would the patient rather a call back or a response via [facility]? call   Best Call Back Number:   Additional Information:     Caller stated the pharm faxed request to office

## (undated) DEVICE — COVER PROBE US 5.5X58L NON LTX

## (undated) DEVICE — HEMOSTAT VASC BAND REG 24CM

## (undated) DEVICE — Device

## (undated) DEVICE — CATH NAVICROSS .035X90 STR

## (undated) DEVICE — CATH IMPULSE 5FR PIGTAIL 125CM

## (undated) DEVICE — SHEATH DESTINATION 6FR 45CM

## (undated) DEVICE — KIT INTRODUCER W/GUIDEWIRE

## (undated) DEVICE — CATH ULTRAVERSE 018 4X40X150

## (undated) DEVICE — CATH SHOCKWAVE M5 IVL 7.0X60MM

## (undated) DEVICE — CATH SOFT VU BRAIDED 4F X 80CM

## (undated) DEVICE — PAD DEFIB CADENCE ADULT R2

## (undated) DEVICE — SET MICRO PUNCT 4FR/MPIS-401

## (undated) DEVICE — COVER BAND BAG 40 X 40

## (undated) DEVICE — GUIDEWIRE COUGAR XT 300 ST HY

## (undated) DEVICE — CATH LUTONIX DCB 018X130X6X60

## (undated) DEVICE — CATH IMA INFINITI 4FRX100CM

## (undated) DEVICE — CATH GLIDECATH PV MLTCRV 4FR

## (undated) DEVICE — GUIDEWIRE ADVNTG 035X260CM ANG

## (undated) DEVICE — SHEATH INTRODUCER 6FR 11CM

## (undated) DEVICE — VISE RADIFOCUS MULTI TORQUE

## (undated) DEVICE — DRAPE ANGIO BRACH 38X44IN

## (undated) DEVICE — KIT LEFT HEART MANIFOLD CUSTOM

## (undated) DEVICE — CATH ULTRAVERSE 018 3X40X150

## (undated) DEVICE — CONTRAST VISIPAQUE 150ML

## (undated) DEVICE — GUIDEWIRE AMPLATZ STIFF 260X7

## (undated) DEVICE — TUBING HPCIL ROT M/F ADPT 48IN

## (undated) DEVICE — SHEATH INTRODUCER 7FR 11CM

## (undated) DEVICE — GLIDESHEATH SLENDER SS 5FR10CM

## (undated) DEVICE — SHEATH DESTINATION 7F X 45CM

## (undated) DEVICE — GUIDWIRE HI-TORQUE .018X300CM

## (undated) DEVICE — SHEATH INTRODUCER 4FR 11CM

## (undated) DEVICE — INFLATOR ENCORE 26 BLLN INFL

## (undated) DEVICE — CATH EAGLE EYE ST .014X20X150